# Patient Record
Sex: MALE | Race: ASIAN | NOT HISPANIC OR LATINO | Employment: UNEMPLOYED | URBAN - METROPOLITAN AREA
[De-identification: names, ages, dates, MRNs, and addresses within clinical notes are randomized per-mention and may not be internally consistent; named-entity substitution may affect disease eponyms.]

---

## 2017-02-09 ENCOUNTER — OFFICE VISIT - HEALTHEAST (OUTPATIENT)
Dept: FAMILY MEDICINE | Facility: CLINIC | Age: 62
End: 2017-02-09

## 2017-02-09 DIAGNOSIS — M70.71 HIP BURSITIS, RIGHT: ICD-10-CM

## 2017-02-09 DIAGNOSIS — R10.9 ABDOMINAL PAIN: ICD-10-CM

## 2017-02-09 DIAGNOSIS — Z00.00 ROUTINE HEALTH MAINTENANCE: ICD-10-CM

## 2017-02-09 DIAGNOSIS — M79.18 MYOFASCIAL PAIN: ICD-10-CM

## 2017-02-13 ENCOUNTER — AMBULATORY - HEALTHEAST (OUTPATIENT)
Dept: LAB | Facility: CLINIC | Age: 62
End: 2017-02-13

## 2017-02-13 DIAGNOSIS — Z00.00 ROUTINE HEALTH MAINTENANCE: ICD-10-CM

## 2017-03-10 ENCOUNTER — OFFICE VISIT - HEALTHEAST (OUTPATIENT)
Dept: FAMILY MEDICINE | Facility: CLINIC | Age: 62
End: 2017-03-10

## 2017-03-10 DIAGNOSIS — J20.9 ACUTE BRONCHITIS: ICD-10-CM

## 2017-03-10 DIAGNOSIS — R12 HEARTBURN: ICD-10-CM

## 2017-03-10 DIAGNOSIS — M70.71 HIP BURSITIS, RIGHT: ICD-10-CM

## 2017-05-02 ENCOUNTER — COMMUNICATION - HEALTHEAST (OUTPATIENT)
Dept: FAMILY MEDICINE | Facility: CLINIC | Age: 62
End: 2017-05-02

## 2017-05-02 ENCOUNTER — OFFICE VISIT - HEALTHEAST (OUTPATIENT)
Dept: FAMILY MEDICINE | Facility: CLINIC | Age: 62
End: 2017-05-02

## 2017-05-02 DIAGNOSIS — M70.71 HIP BURSITIS, RIGHT: ICD-10-CM

## 2017-05-02 DIAGNOSIS — R12 HEARTBURN: ICD-10-CM

## 2017-05-02 DIAGNOSIS — M79.18 MYOFASCIAL PAIN: ICD-10-CM

## 2017-05-23 ENCOUNTER — AMBULATORY - HEALTHEAST (OUTPATIENT)
Dept: NURSING | Facility: CLINIC | Age: 62
End: 2017-05-23

## 2017-05-30 ENCOUNTER — OFFICE VISIT - HEALTHEAST (OUTPATIENT)
Dept: FAMILY MEDICINE | Facility: CLINIC | Age: 62
End: 2017-05-30

## 2017-05-30 DIAGNOSIS — M25.512 SHOULDER PAIN, LEFT: ICD-10-CM

## 2017-05-30 DIAGNOSIS — M70.71 HIP BURSITIS, RIGHT: ICD-10-CM

## 2017-05-30 DIAGNOSIS — M79.18 MYOFASCIAL PAIN: ICD-10-CM

## 2017-05-30 DIAGNOSIS — F39 MOOD DISORDER (H): ICD-10-CM

## 2017-05-30 DIAGNOSIS — R05.9 COUGH: ICD-10-CM

## 2017-06-06 ENCOUNTER — COMMUNICATION - HEALTHEAST (OUTPATIENT)
Dept: FAMILY MEDICINE | Facility: CLINIC | Age: 62
End: 2017-06-06

## 2017-06-08 ENCOUNTER — COMMUNICATION - HEALTHEAST (OUTPATIENT)
Dept: NURSING | Facility: CLINIC | Age: 62
End: 2017-06-08

## 2017-06-20 ENCOUNTER — COMMUNICATION - HEALTHEAST (OUTPATIENT)
Dept: NURSING | Facility: CLINIC | Age: 62
End: 2017-06-20

## 2017-06-21 ENCOUNTER — COMMUNICATION - HEALTHEAST (OUTPATIENT)
Dept: NURSING | Facility: CLINIC | Age: 62
End: 2017-06-21

## 2017-06-28 ENCOUNTER — COMMUNICATION - HEALTHEAST (OUTPATIENT)
Dept: NURSING | Facility: CLINIC | Age: 62
End: 2017-06-28

## 2017-06-28 ENCOUNTER — COMMUNICATION - HEALTHEAST (OUTPATIENT)
Dept: FAMILY MEDICINE | Facility: CLINIC | Age: 62
End: 2017-06-28

## 2017-06-28 ENCOUNTER — AMBULATORY - HEALTHEAST (OUTPATIENT)
Dept: CARE COORDINATION | Facility: CLINIC | Age: 62
End: 2017-06-28

## 2017-06-28 DIAGNOSIS — Z71.89 COUNSELING AND COORDINATION OF CARE: ICD-10-CM

## 2017-06-28 DIAGNOSIS — M79.18 MYOFASCIAL PAIN: ICD-10-CM

## 2017-07-05 ENCOUNTER — COMMUNICATION - HEALTHEAST (OUTPATIENT)
Dept: FAMILY MEDICINE | Facility: CLINIC | Age: 62
End: 2017-07-05

## 2017-07-05 ENCOUNTER — OFFICE VISIT - HEALTHEAST (OUTPATIENT)
Dept: FAMILY MEDICINE | Facility: CLINIC | Age: 62
End: 2017-07-05

## 2017-07-05 ENCOUNTER — COMMUNICATION - HEALTHEAST (OUTPATIENT)
Dept: NURSING | Facility: CLINIC | Age: 62
End: 2017-07-05

## 2017-07-05 DIAGNOSIS — R53.83 FATIGUE: ICD-10-CM

## 2017-07-05 DIAGNOSIS — M79.18 MYOFASCIAL PAIN: ICD-10-CM

## 2017-07-05 DIAGNOSIS — M79.606 LOWER EXTREMITY PAIN: ICD-10-CM

## 2017-07-05 DIAGNOSIS — Z71.89 COUNSELING AND COORDINATION OF CARE: ICD-10-CM

## 2017-07-05 DIAGNOSIS — R10.9 ABDOMINAL PAIN: ICD-10-CM

## 2017-07-07 ENCOUNTER — COMMUNICATION - HEALTHEAST (OUTPATIENT)
Dept: NURSING | Facility: CLINIC | Age: 62
End: 2017-07-07

## 2017-07-10 ENCOUNTER — COMMUNICATION - HEALTHEAST (OUTPATIENT)
Dept: CARE COORDINATION | Facility: CLINIC | Age: 62
End: 2017-07-10

## 2017-07-11 ENCOUNTER — AMBULATORY - HEALTHEAST (OUTPATIENT)
Dept: FAMILY MEDICINE | Facility: CLINIC | Age: 62
End: 2017-07-11

## 2017-07-11 ENCOUNTER — COMMUNICATION - HEALTHEAST (OUTPATIENT)
Dept: FAMILY MEDICINE | Facility: CLINIC | Age: 62
End: 2017-07-11

## 2017-07-24 ENCOUNTER — COMMUNICATION - HEALTHEAST (OUTPATIENT)
Dept: NURSING | Facility: CLINIC | Age: 62
End: 2017-07-24

## 2017-07-25 ENCOUNTER — COMMUNICATION - HEALTHEAST (OUTPATIENT)
Dept: CARE COORDINATION | Facility: CLINIC | Age: 62
End: 2017-07-25

## 2017-07-25 ENCOUNTER — COMMUNICATION - HEALTHEAST (OUTPATIENT)
Dept: NURSING | Facility: CLINIC | Age: 62
End: 2017-07-25

## 2017-07-25 ENCOUNTER — AMBULATORY - HEALTHEAST (OUTPATIENT)
Dept: CARE COORDINATION | Facility: CLINIC | Age: 62
End: 2017-07-25

## 2017-08-02 ENCOUNTER — OFFICE VISIT - HEALTHEAST (OUTPATIENT)
Dept: FAMILY MEDICINE | Facility: CLINIC | Age: 62
End: 2017-08-02

## 2017-08-02 DIAGNOSIS — R10.11 RUQ PAIN: ICD-10-CM

## 2017-08-02 DIAGNOSIS — K21.9 GERD (GASTROESOPHAGEAL REFLUX DISEASE): ICD-10-CM

## 2017-08-02 RX ORDER — OMEPRAZOLE 40 MG/1
40 CAPSULE, DELAYED RELEASE ORAL DAILY
Qty: 90 CAPSULE | Refills: 3 | Status: SHIPPED | OUTPATIENT
Start: 2017-08-02 | End: 2023-11-28

## 2017-08-03 ENCOUNTER — COMMUNICATION - HEALTHEAST (OUTPATIENT)
Dept: FAMILY MEDICINE | Facility: CLINIC | Age: 62
End: 2017-08-03

## 2017-08-03 DIAGNOSIS — M79.18 MYOFASCIAL PAIN: ICD-10-CM

## 2017-08-04 ENCOUNTER — OFFICE VISIT - HEALTHEAST (OUTPATIENT)
Dept: FAMILY MEDICINE | Facility: CLINIC | Age: 62
End: 2017-08-04

## 2017-08-04 DIAGNOSIS — R10.9 ABDOMINAL PAIN: ICD-10-CM

## 2017-08-04 DIAGNOSIS — M79.606 LOWER EXTREMITY PAIN: ICD-10-CM

## 2017-08-04 DIAGNOSIS — K21.9 GERD (GASTROESOPHAGEAL REFLUX DISEASE): ICD-10-CM

## 2017-08-07 ENCOUNTER — COMMUNICATION - HEALTHEAST (OUTPATIENT)
Dept: NURSING | Facility: CLINIC | Age: 62
End: 2017-08-07

## 2017-08-11 ENCOUNTER — OFFICE VISIT - HEALTHEAST (OUTPATIENT)
Dept: PHYSICAL THERAPY | Facility: REHABILITATION | Age: 62
End: 2017-08-11

## 2017-08-11 DIAGNOSIS — G89.29 CHRONIC RIGHT HIP PAIN: ICD-10-CM

## 2017-08-11 DIAGNOSIS — M54.16 LUMBAR RADICULOPATHY, CHRONIC: ICD-10-CM

## 2017-08-11 DIAGNOSIS — M62.81 GENERALIZED MUSCLE WEAKNESS: ICD-10-CM

## 2017-08-11 DIAGNOSIS — M25.551 CHRONIC RIGHT HIP PAIN: ICD-10-CM

## 2017-08-15 ENCOUNTER — COMMUNICATION - HEALTHEAST (OUTPATIENT)
Dept: FAMILY MEDICINE | Facility: CLINIC | Age: 62
End: 2017-08-15

## 2017-08-19 ENCOUNTER — COMMUNICATION - HEALTHEAST (OUTPATIENT)
Dept: FAMILY MEDICINE | Facility: CLINIC | Age: 62
End: 2017-08-19

## 2017-08-19 DIAGNOSIS — M79.18 MYOFASCIAL PAIN: ICD-10-CM

## 2017-09-01 ENCOUNTER — COMMUNICATION - HEALTHEAST (OUTPATIENT)
Dept: NURSING | Facility: CLINIC | Age: 62
End: 2017-09-01

## 2017-09-05 ENCOUNTER — COMMUNICATION - HEALTHEAST (OUTPATIENT)
Dept: NURSING | Facility: CLINIC | Age: 62
End: 2017-09-05

## 2017-09-07 ENCOUNTER — COMMUNICATION - HEALTHEAST (OUTPATIENT)
Dept: NURSING | Facility: CLINIC | Age: 62
End: 2017-09-07

## 2017-09-18 ENCOUNTER — RECORDS - HEALTHEAST (OUTPATIENT)
Dept: ADMINISTRATIVE | Facility: OTHER | Age: 62
End: 2017-09-18

## 2017-09-19 ENCOUNTER — COMMUNICATION - HEALTHEAST (OUTPATIENT)
Dept: FAMILY MEDICINE | Facility: CLINIC | Age: 62
End: 2017-09-19

## 2017-09-19 ENCOUNTER — OFFICE VISIT - HEALTHEAST (OUTPATIENT)
Dept: FAMILY MEDICINE | Facility: CLINIC | Age: 62
End: 2017-09-19

## 2017-09-19 DIAGNOSIS — M54.16 LUMBAR RADICULOPATHY: ICD-10-CM

## 2017-09-19 DIAGNOSIS — M79.18 MYOFASCIAL PAIN: ICD-10-CM

## 2017-09-19 DIAGNOSIS — K21.9 GERD (GASTROESOPHAGEAL REFLUX DISEASE): ICD-10-CM

## 2017-09-19 RX ORDER — GABAPENTIN 300 MG/1
CAPSULE ORAL
Qty: 60 CAPSULE | Refills: 5 | Status: SHIPPED | OUTPATIENT
Start: 2017-09-19 | End: 2023-11-28

## 2017-09-19 ASSESSMENT — MIFFLIN-ST. JEOR: SCORE: 1206.19

## 2017-10-05 ENCOUNTER — HOSPITAL ENCOUNTER (OUTPATIENT)
Dept: PHYSICAL MEDICINE AND REHAB | Facility: CLINIC | Age: 62
Discharge: HOME | End: 2017-10-05
Attending: FAMILY MEDICINE

## 2017-10-05 DIAGNOSIS — M48.061 LUMBAR SPINAL STENOSIS: ICD-10-CM

## 2017-10-05 DIAGNOSIS — F40.240 CLAUSTROPHOBIA: ICD-10-CM

## 2017-10-05 DIAGNOSIS — M54.50 LUMBAR SPINE PAIN: ICD-10-CM

## 2017-10-05 ASSESSMENT — MIFFLIN-ST. JEOR: SCORE: 1215.26

## 2017-10-09 ENCOUNTER — COMMUNICATION - HEALTHEAST (OUTPATIENT)
Dept: NURSING | Facility: CLINIC | Age: 62
End: 2017-10-09

## 2017-10-12 ENCOUNTER — COMMUNICATION - HEALTHEAST (OUTPATIENT)
Dept: CARE COORDINATION | Facility: CLINIC | Age: 62
End: 2017-10-12

## 2017-10-15 ENCOUNTER — COMMUNICATION - HEALTHEAST (OUTPATIENT)
Dept: FAMILY MEDICINE | Facility: CLINIC | Age: 62
End: 2017-10-15

## 2017-10-17 ENCOUNTER — OFFICE VISIT - HEALTHEAST (OUTPATIENT)
Dept: FAMILY MEDICINE | Facility: CLINIC | Age: 62
End: 2017-10-17

## 2017-10-17 DIAGNOSIS — Z23 NEED FOR PROPHYLACTIC VACCINATION AND INOCULATION AGAINST INFLUENZA: ICD-10-CM

## 2017-10-17 DIAGNOSIS — M25.512 SHOULDER PAIN, LEFT: ICD-10-CM

## 2017-10-17 DIAGNOSIS — M54.16 LUMBAR RADICULOPATHY: ICD-10-CM

## 2017-10-20 ENCOUNTER — RECORDS - HEALTHEAST (OUTPATIENT)
Dept: ADMINISTRATIVE | Facility: OTHER | Age: 62
End: 2017-10-20

## 2017-10-27 ENCOUNTER — COMMUNICATION - HEALTHEAST (OUTPATIENT)
Dept: NURSING | Facility: CLINIC | Age: 62
End: 2017-10-27

## 2017-11-01 ENCOUNTER — COMMUNICATION - HEALTHEAST (OUTPATIENT)
Dept: NURSING | Facility: CLINIC | Age: 62
End: 2017-11-01

## 2017-11-02 ENCOUNTER — COMMUNICATION - HEALTHEAST (OUTPATIENT)
Dept: NURSING | Facility: CLINIC | Age: 62
End: 2017-11-02

## 2017-11-07 ENCOUNTER — COMMUNICATION - HEALTHEAST (OUTPATIENT)
Dept: NURSING | Facility: CLINIC | Age: 62
End: 2017-11-07

## 2017-11-13 ENCOUNTER — COMMUNICATION - HEALTHEAST (OUTPATIENT)
Dept: NURSING | Facility: CLINIC | Age: 62
End: 2017-11-13

## 2017-11-28 ENCOUNTER — COMMUNICATION - HEALTHEAST (OUTPATIENT)
Dept: NURSING | Facility: CLINIC | Age: 62
End: 2017-11-28

## 2017-11-28 ENCOUNTER — OFFICE VISIT - HEALTHEAST (OUTPATIENT)
Dept: FAMILY MEDICINE | Facility: CLINIC | Age: 62
End: 2017-11-28

## 2017-11-28 DIAGNOSIS — F39 MOOD DISORDER (H): ICD-10-CM

## 2017-11-28 DIAGNOSIS — K21.9 GERD (GASTROESOPHAGEAL REFLUX DISEASE): ICD-10-CM

## 2017-11-28 DIAGNOSIS — M79.18 MYOFASCIAL PAIN: ICD-10-CM

## 2017-11-28 DIAGNOSIS — M54.16 LUMBAR RADICULOPATHY: ICD-10-CM

## 2017-12-07 ENCOUNTER — COMMUNICATION - HEALTHEAST (OUTPATIENT)
Dept: NURSING | Facility: CLINIC | Age: 62
End: 2017-12-07

## 2017-12-13 ENCOUNTER — COMMUNICATION - HEALTHEAST (OUTPATIENT)
Dept: NURSING | Facility: CLINIC | Age: 62
End: 2017-12-13

## 2017-12-13 ENCOUNTER — AMBULATORY - HEALTHEAST (OUTPATIENT)
Dept: CARE COORDINATION | Facility: CLINIC | Age: 62
End: 2017-12-13

## 2017-12-13 ENCOUNTER — OFFICE VISIT - HEALTHEAST (OUTPATIENT)
Dept: NURSING | Facility: CLINIC | Age: 62
End: 2017-12-13

## 2017-12-13 DIAGNOSIS — K21.9 GERD (GASTROESOPHAGEAL REFLUX DISEASE): ICD-10-CM

## 2017-12-13 DIAGNOSIS — Z71.89 COUNSELING AND COORDINATION OF CARE: ICD-10-CM

## 2017-12-13 DIAGNOSIS — M54.16 LUMBAR RADICULOPATHY: ICD-10-CM

## 2017-12-13 DIAGNOSIS — Z71.9 HEALTH EDUCATION: ICD-10-CM

## 2017-12-13 DIAGNOSIS — K21.9 GASTROESOPHAGEAL REFLUX DISEASE, ESOPHAGITIS PRESENCE NOT SPECIFIED: ICD-10-CM

## 2017-12-13 RX ORDER — ERGOCALCIFEROL 1.25 MG/1
50000 CAPSULE ORAL
Status: SHIPPED | COMMUNITY
Start: 2017-12-13 | End: 2023-11-28

## 2017-12-14 RX ORDER — ACETAMINOPHEN 500 MG
1000 TABLET ORAL EVERY 6 HOURS PRN
Qty: 100 TABLET | Refills: 2 | Status: SHIPPED | OUTPATIENT
Start: 2017-12-14 | End: 2023-11-28

## 2017-12-18 ENCOUNTER — RECORDS - HEALTHEAST (OUTPATIENT)
Dept: ADMINISTRATIVE | Facility: OTHER | Age: 62
End: 2017-12-18

## 2017-12-19 ENCOUNTER — RECORDS - HEALTHEAST (OUTPATIENT)
Dept: ADMINISTRATIVE | Facility: OTHER | Age: 62
End: 2017-12-19

## 2017-12-20 ENCOUNTER — RECORDS - HEALTHEAST (OUTPATIENT)
Dept: ADMINISTRATIVE | Facility: OTHER | Age: 62
End: 2017-12-20

## 2017-12-26 ENCOUNTER — COMMUNICATION - HEALTHEAST (OUTPATIENT)
Dept: NURSING | Facility: CLINIC | Age: 62
End: 2017-12-26

## 2017-12-26 ENCOUNTER — OFFICE VISIT - HEALTHEAST (OUTPATIENT)
Dept: FAMILY MEDICINE | Facility: CLINIC | Age: 62
End: 2017-12-26

## 2017-12-26 DIAGNOSIS — M25.512 ACUTE PAIN OF LEFT SHOULDER: ICD-10-CM

## 2017-12-26 DIAGNOSIS — G89.29 CHRONIC ABDOMINAL PAIN: ICD-10-CM

## 2017-12-26 DIAGNOSIS — R10.9 CHRONIC ABDOMINAL PAIN: ICD-10-CM

## 2017-12-26 RX ORDER — DOCUSATE SODIUM 100 MG/1
CAPSULE, LIQUID FILLED ORAL
Refills: 6 | Status: SHIPPED | COMMUNITY
Start: 2017-11-28 | End: 2023-11-28

## 2017-12-26 RX ORDER — BISACODYL 5 MG/1
TABLET, DELAYED RELEASE ORAL
Refills: 0 | Status: SHIPPED | COMMUNITY
Start: 2017-12-08 | End: 2023-11-28

## 2017-12-26 RX ORDER — MAGNESIUM CITRATE
SOLUTION, ORAL ORAL
Refills: 0 | Status: SHIPPED | COMMUNITY
Start: 2017-12-08 | End: 2023-11-28

## 2017-12-26 RX ORDER — POLYETHYLENE GLYCOL 3350 17 G/17G
POWDER, FOR SOLUTION ORAL
Refills: 0 | Status: SHIPPED | COMMUNITY
Start: 2017-12-08 | End: 2023-11-28

## 2017-12-26 ASSESSMENT — MIFFLIN-ST. JEOR: SCORE: 1237.94

## 2018-01-10 ENCOUNTER — OFFICE VISIT - HEALTHEAST (OUTPATIENT)
Dept: FAMILY MEDICINE | Facility: CLINIC | Age: 63
End: 2018-01-10

## 2018-01-10 DIAGNOSIS — M54.16 LUMBAR RADICULOPATHY: ICD-10-CM

## 2018-01-10 DIAGNOSIS — K21.9 GERD (GASTROESOPHAGEAL REFLUX DISEASE): ICD-10-CM

## 2018-01-10 DIAGNOSIS — J20.9 ACUTE BRONCHITIS: ICD-10-CM

## 2018-01-10 DIAGNOSIS — R61 DIAPHORESIS: ICD-10-CM

## 2018-01-10 RX ORDER — LORAZEPAM 0.5 MG
4000 TABLET ORAL DAILY
Refills: 3 | Status: SHIPPED | COMMUNITY
Start: 2017-12-30

## 2018-01-12 ENCOUNTER — COMMUNICATION - HEALTHEAST (OUTPATIENT)
Dept: FAMILY MEDICINE | Facility: CLINIC | Age: 63
End: 2018-01-12

## 2018-01-12 ENCOUNTER — AMBULATORY - HEALTHEAST (OUTPATIENT)
Dept: FAMILY MEDICINE | Facility: CLINIC | Age: 63
End: 2018-01-12

## 2018-01-12 DIAGNOSIS — H91.90 HEARING LOSS: ICD-10-CM

## 2018-01-12 LAB
QTF INTERPRETATION: NORMAL
QTF MITOGEN - NIL: >10 IU/ML
QTF NIL: 0.88 IU/ML
QTF RESULT: NEGATIVE
QTF TB ANTIGEN - NIL: -0.07 IU/ML

## 2018-01-29 ENCOUNTER — COMMUNICATION - HEALTHEAST (OUTPATIENT)
Dept: CARE COORDINATION | Facility: CLINIC | Age: 63
End: 2018-01-29

## 2018-02-06 ENCOUNTER — COMMUNICATION - HEALTHEAST (OUTPATIENT)
Dept: NURSING | Facility: CLINIC | Age: 63
End: 2018-02-06

## 2018-02-08 ENCOUNTER — RECORDS - HEALTHEAST (OUTPATIENT)
Dept: LAB | Facility: CLINIC | Age: 63
End: 2018-02-08

## 2018-02-08 ENCOUNTER — RECORDS - HEALTHEAST (OUTPATIENT)
Dept: ADMINISTRATIVE | Facility: OTHER | Age: 63
End: 2018-02-08

## 2018-02-08 LAB
ALBUMIN SERPL-MCNC: 4.1 G/DL (ref 3.5–5)
ALP SERPL-CCNC: 121 U/L (ref 45–120)
ALT SERPL W P-5'-P-CCNC: 28 U/L (ref 0–45)
ANION GAP SERPL CALCULATED.3IONS-SCNC: 13 MMOL/L (ref 5–18)
AST SERPL W P-5'-P-CCNC: 29 U/L (ref 0–40)
BILIRUB SERPL-MCNC: 0.8 MG/DL (ref 0–1)
BUN SERPL-MCNC: 13 MG/DL (ref 8–22)
CALCIUM SERPL-MCNC: 10.2 MG/DL (ref 8.5–10.5)
CHLORIDE BLD-SCNC: 104 MMOL/L (ref 98–107)
CO2 SERPL-SCNC: 24 MMOL/L (ref 22–31)
CREAT SERPL-MCNC: 0.83 MG/DL (ref 0.7–1.3)
GFR SERPL CREATININE-BSD FRML MDRD: >60 ML/MIN/1.73M2
GLUCOSE BLD-MCNC: 90 MG/DL (ref 70–125)
POTASSIUM BLD-SCNC: 4.4 MMOL/L (ref 3.5–5)
PROT SERPL-MCNC: 8.3 G/DL (ref 6–8)
SODIUM SERPL-SCNC: 141 MMOL/L (ref 136–145)

## 2018-02-09 LAB
HBV SURFACE AG SERPL QL IA: NEGATIVE
HCV AB SERPL QL IA: NEGATIVE

## 2018-02-20 ENCOUNTER — COMMUNICATION - HEALTHEAST (OUTPATIENT)
Dept: NURSING | Facility: CLINIC | Age: 63
End: 2018-02-20

## 2018-02-21 ENCOUNTER — RECORDS - HEALTHEAST (OUTPATIENT)
Dept: ADMINISTRATIVE | Facility: OTHER | Age: 63
End: 2018-02-21

## 2018-02-23 ENCOUNTER — COMMUNICATION - HEALTHEAST (OUTPATIENT)
Dept: NURSING | Facility: CLINIC | Age: 63
End: 2018-02-23

## 2018-02-23 ENCOUNTER — AMBULATORY - HEALTHEAST (OUTPATIENT)
Dept: CARE COORDINATION | Facility: CLINIC | Age: 63
End: 2018-02-23

## 2018-02-23 ENCOUNTER — COMMUNICATION - HEALTHEAST (OUTPATIENT)
Dept: CARE COORDINATION | Facility: CLINIC | Age: 63
End: 2018-02-23

## 2018-02-26 ENCOUNTER — RECORDS - HEALTHEAST (OUTPATIENT)
Dept: LAB | Facility: CLINIC | Age: 63
End: 2018-02-26

## 2018-02-26 ENCOUNTER — RECORDS - HEALTHEAST (OUTPATIENT)
Dept: ADMINISTRATIVE | Facility: OTHER | Age: 63
End: 2018-02-26

## 2018-02-26 LAB
FOLATE SERPL-MCNC: 5.4 NG/ML
HIV 1+2 AB+HIV1 P24 AG SERPL QL IA: NEGATIVE
TSH SERPL DL<=0.005 MIU/L-ACNC: 6.57 UIU/ML (ref 0.3–5)
VIT B12 SERPL-MCNC: 404 PG/ML (ref 213–816)

## 2018-02-27 ENCOUNTER — RECORDS - HEALTHEAST (OUTPATIENT)
Dept: LAB | Facility: CLINIC | Age: 63
End: 2018-02-27

## 2018-02-27 LAB
SYPHILIS RPR SCREEN - HISTORICAL: NORMAL
T3 SERPL-MCNC: 89 NG/DL (ref 45–175)
T4 FREE SERPL-MCNC: 1 NG/DL (ref 0.7–1.8)

## 2018-03-02 ENCOUNTER — RECORDS - HEALTHEAST (OUTPATIENT)
Dept: ADMINISTRATIVE | Facility: OTHER | Age: 63
End: 2018-03-02

## 2018-03-02 ENCOUNTER — RECORDS - HEALTHEAST (OUTPATIENT)
Dept: LAB | Facility: CLINIC | Age: 63
End: 2018-03-02

## 2018-03-02 LAB
ANION GAP SERPL CALCULATED.3IONS-SCNC: 8 MMOL/L (ref 5–18)
BNP SERPL-MCNC: <10 PG/ML (ref 0–53)
BUN SERPL-MCNC: 15 MG/DL (ref 8–22)
CALCIUM SERPL-MCNC: 9.5 MG/DL (ref 8.5–10.5)
CHLORIDE BLD-SCNC: 105 MMOL/L (ref 98–107)
CO2 SERPL-SCNC: 29 MMOL/L (ref 22–31)
CREAT SERPL-MCNC: 0.85 MG/DL (ref 0.7–1.3)
GFR SERPL CREATININE-BSD FRML MDRD: >60 ML/MIN/1.73M2
GLUCOSE BLD-MCNC: 120 MG/DL (ref 70–125)
POTASSIUM BLD-SCNC: 3.8 MMOL/L (ref 3.5–5)
SODIUM SERPL-SCNC: 142 MMOL/L (ref 136–145)

## 2018-03-07 ENCOUNTER — RECORDS - HEALTHEAST (OUTPATIENT)
Dept: ADMINISTRATIVE | Facility: OTHER | Age: 63
End: 2018-03-07

## 2018-03-08 ENCOUNTER — RECORDS - HEALTHEAST (OUTPATIENT)
Dept: ADMINISTRATIVE | Facility: OTHER | Age: 63
End: 2018-03-08

## 2018-03-08 ENCOUNTER — COMMUNICATION - HEALTHEAST (OUTPATIENT)
Dept: OTOLARYNGOLOGY | Facility: CLINIC | Age: 63
End: 2018-03-08

## 2018-03-09 ENCOUNTER — COMMUNICATION - HEALTHEAST (OUTPATIENT)
Dept: ENDOCRINOLOGY | Facility: CLINIC | Age: 63
End: 2018-03-09

## 2018-03-15 ENCOUNTER — RECORDS - HEALTHEAST (OUTPATIENT)
Dept: LAB | Facility: CLINIC | Age: 63
End: 2018-03-15

## 2018-03-15 ENCOUNTER — RECORDS - HEALTHEAST (OUTPATIENT)
Dept: ADMINISTRATIVE | Facility: OTHER | Age: 63
End: 2018-03-15

## 2018-03-17 LAB — BACTERIA SPEC CULT: NORMAL

## 2018-03-20 ENCOUNTER — RECORDS - HEALTHEAST (OUTPATIENT)
Dept: ADMINISTRATIVE | Facility: OTHER | Age: 63
End: 2018-03-20

## 2018-03-25 ENCOUNTER — COMMUNICATION - HEALTHEAST (OUTPATIENT)
Dept: FAMILY MEDICINE | Facility: CLINIC | Age: 63
End: 2018-03-25

## 2018-03-26 ENCOUNTER — RECORDS - HEALTHEAST (OUTPATIENT)
Dept: ADMINISTRATIVE | Facility: OTHER | Age: 63
End: 2018-03-26

## 2018-04-18 ENCOUNTER — RECORDS - HEALTHEAST (OUTPATIENT)
Dept: ADMINISTRATIVE | Facility: OTHER | Age: 63
End: 2018-04-18

## 2018-04-26 ENCOUNTER — RECORDS - HEALTHEAST (OUTPATIENT)
Dept: ADMINISTRATIVE | Facility: OTHER | Age: 63
End: 2018-04-26

## 2018-04-26 ENCOUNTER — RECORDS - HEALTHEAST (OUTPATIENT)
Dept: LAB | Facility: CLINIC | Age: 63
End: 2018-04-26

## 2018-04-26 LAB
ANION GAP SERPL CALCULATED.3IONS-SCNC: 12 MMOL/L (ref 5–18)
BUN SERPL-MCNC: 12 MG/DL (ref 8–22)
CALCIUM SERPL-MCNC: 9.8 MG/DL (ref 8.5–10.5)
CHLORIDE BLD-SCNC: 106 MMOL/L (ref 98–107)
CO2 SERPL-SCNC: 23 MMOL/L (ref 22–31)
CREAT SERPL-MCNC: 0.82 MG/DL (ref 0.7–1.3)
GFR SERPL CREATININE-BSD FRML MDRD: >60 ML/MIN/1.73M2
GLUCOSE BLD-MCNC: 93 MG/DL (ref 70–125)
POTASSIUM BLD-SCNC: 3.9 MMOL/L (ref 3.5–5)
SODIUM SERPL-SCNC: 141 MMOL/L (ref 136–145)

## 2018-05-23 ENCOUNTER — OFFICE VISIT - HEALTHEAST (OUTPATIENT)
Dept: INFECTIOUS DISEASES | Facility: CLINIC | Age: 63
End: 2018-05-23

## 2018-05-23 DIAGNOSIS — B69.0 NEUROCYSTICERCOSIS: ICD-10-CM

## 2018-05-23 ASSESSMENT — MIFFLIN-ST. JEOR: SCORE: 1222.98

## 2018-06-06 ENCOUNTER — OFFICE VISIT - HEALTHEAST (OUTPATIENT)
Dept: INFECTIOUS DISEASES | Facility: CLINIC | Age: 63
End: 2018-06-06

## 2018-06-06 DIAGNOSIS — B69.0 NEUROCYSTICERCOSIS: ICD-10-CM

## 2018-06-06 ASSESSMENT — MIFFLIN-ST. JEOR: SCORE: 1219.8

## 2018-07-11 ENCOUNTER — OFFICE VISIT - HEALTHEAST (OUTPATIENT)
Dept: INFECTIOUS DISEASES | Facility: CLINIC | Age: 63
End: 2018-07-11

## 2018-07-11 DIAGNOSIS — B69.0 NEUROCYSTICERCOSIS: ICD-10-CM

## 2018-07-11 RX ORDER — ALBENDAZOLE 200 MG/1
400 TABLET, FILM COATED ORAL 2 TIMES DAILY
Status: SHIPPED | COMMUNITY
Start: 2018-07-11 | End: 2023-11-28

## 2018-07-11 RX ORDER — LEVOTHYROXINE SODIUM 25 UG/1
1 TABLET ORAL DAILY
Status: SHIPPED | COMMUNITY
Start: 2018-07-10

## 2018-07-11 ASSESSMENT — MIFFLIN-ST. JEOR: SCORE: 1219.8

## 2018-07-18 ENCOUNTER — RECORDS - HEALTHEAST (OUTPATIENT)
Dept: LAB | Facility: CLINIC | Age: 63
End: 2018-07-18

## 2018-07-18 LAB
ANION GAP SERPL CALCULATED.3IONS-SCNC: 14 MMOL/L (ref 5–18)
BUN SERPL-MCNC: 15 MG/DL (ref 8–22)
CALCIUM SERPL-MCNC: 10.1 MG/DL (ref 8.5–10.5)
CHLORIDE BLD-SCNC: 101 MMOL/L (ref 98–107)
CO2 SERPL-SCNC: 25 MMOL/L (ref 22–31)
CREAT SERPL-MCNC: 0.99 MG/DL (ref 0.7–1.3)
GFR SERPL CREATININE-BSD FRML MDRD: >60 ML/MIN/1.73M2
GLUCOSE BLD-MCNC: 101 MG/DL (ref 70–125)
POTASSIUM BLD-SCNC: 4.2 MMOL/L (ref 3.5–5)
SODIUM SERPL-SCNC: 140 MMOL/L (ref 136–145)
URATE SERPL-MCNC: 10.2 MG/DL (ref 3–8)

## 2018-11-21 ENCOUNTER — RECORDS - HEALTHEAST (OUTPATIENT)
Dept: LAB | Facility: CLINIC | Age: 63
End: 2018-11-21

## 2018-11-21 LAB
BASOPHILS # BLD AUTO: 0.1 THOU/UL (ref 0–0.2)
BASOPHILS NFR BLD AUTO: 1 % (ref 0–2)
EOSINOPHIL # BLD AUTO: 0.3 THOU/UL (ref 0–0.4)
EOSINOPHIL NFR BLD AUTO: 5 % (ref 0–6)
ERYTHROCYTE [DISTWIDTH] IN BLOOD BY AUTOMATED COUNT: 12.5 % (ref 11–14.5)
HCT VFR BLD AUTO: 50.4 % (ref 40–54)
HGB BLD-MCNC: 17.6 G/DL (ref 14–18)
LYMPHOCYTES # BLD AUTO: 2.2 THOU/UL (ref 0.8–4.4)
LYMPHOCYTES NFR BLD AUTO: 30 % (ref 20–40)
MCH RBC QN AUTO: 31.5 PG (ref 27–34)
MCHC RBC AUTO-ENTMCNC: 34.9 G/DL (ref 32–36)
MCV RBC AUTO: 90 FL (ref 80–100)
MONOCYTES # BLD AUTO: 0.6 THOU/UL (ref 0–0.9)
MONOCYTES NFR BLD AUTO: 9 % (ref 2–10)
NEUTROPHILS # BLD AUTO: 4 THOU/UL (ref 2–7.7)
NEUTROPHILS NFR BLD AUTO: 56 % (ref 50–70)
PLATELET # BLD AUTO: 281 THOU/UL (ref 140–440)
PMV BLD AUTO: 10.9 FL (ref 8.5–12.5)
RBC # BLD AUTO: 5.59 MILL/UL (ref 4.4–6.2)
WBC: 7.3 THOU/UL (ref 4–11)

## 2018-12-12 ENCOUNTER — RECORDS - HEALTHEAST (OUTPATIENT)
Dept: ADMINISTRATIVE | Facility: OTHER | Age: 63
End: 2018-12-12

## 2018-12-17 ENCOUNTER — COMMUNICATION - HEALTHEAST (OUTPATIENT)
Dept: INFECTIOUS DISEASES | Facility: CLINIC | Age: 63
End: 2018-12-17

## 2018-12-19 ENCOUNTER — RECORDS - HEALTHEAST (OUTPATIENT)
Dept: LAB | Facility: CLINIC | Age: 63
End: 2018-12-19

## 2018-12-19 LAB — TSH SERPL DL<=0.005 MIU/L-ACNC: 1.88 UIU/ML (ref 0.3–5)

## 2019-02-20 ENCOUNTER — COMMUNICATION - HEALTHEAST (OUTPATIENT)
Dept: ADMINISTRATIVE | Facility: CLINIC | Age: 64
End: 2019-02-20

## 2019-03-27 ENCOUNTER — OFFICE VISIT - HEALTHEAST (OUTPATIENT)
Dept: INFECTIOUS DISEASES | Facility: CLINIC | Age: 64
End: 2019-03-27

## 2019-03-27 DIAGNOSIS — B69.0 NEUROCYSTICERCOSIS: ICD-10-CM

## 2019-04-03 ENCOUNTER — RECORDS - HEALTHEAST (OUTPATIENT)
Dept: LAB | Facility: CLINIC | Age: 64
End: 2019-04-03

## 2019-04-03 LAB
ALBUMIN SERPL-MCNC: 4.1 G/DL (ref 3.5–5)
ALP SERPL-CCNC: 92 U/L (ref 45–120)
ALT SERPL W P-5'-P-CCNC: 31 U/L (ref 0–45)
ANION GAP SERPL CALCULATED.3IONS-SCNC: 13 MMOL/L (ref 5–18)
AST SERPL W P-5'-P-CCNC: 22 U/L (ref 0–40)
BILIRUB SERPL-MCNC: 0.8 MG/DL (ref 0–1)
BUN SERPL-MCNC: 11 MG/DL (ref 8–22)
CALCIUM SERPL-MCNC: 9.8 MG/DL (ref 8.5–10.5)
CHLORIDE BLD-SCNC: 108 MMOL/L (ref 98–107)
CO2 SERPL-SCNC: 20 MMOL/L (ref 22–31)
CREAT SERPL-MCNC: 0.83 MG/DL (ref 0.7–1.3)
GFR SERPL CREATININE-BSD FRML MDRD: >60 ML/MIN/1.73M2
GLUCOSE BLD-MCNC: 134 MG/DL (ref 70–125)
LIPASE SERPL-CCNC: 31 U/L (ref 0–52)
POTASSIUM BLD-SCNC: 3.5 MMOL/L (ref 3.5–5)
PROT SERPL-MCNC: 7.7 G/DL (ref 6–8)
SODIUM SERPL-SCNC: 141 MMOL/L (ref 136–145)

## 2019-04-04 ENCOUNTER — RECORDS - HEALTHEAST (OUTPATIENT)
Dept: LAB | Facility: CLINIC | Age: 64
End: 2019-04-04

## 2019-04-05 LAB — HBA1C MFR BLD: 4.4 % (ref 4.2–6.1)

## 2019-04-24 ENCOUNTER — RECORDS - HEALTHEAST (OUTPATIENT)
Dept: LAB | Facility: CLINIC | Age: 64
End: 2019-04-24

## 2019-04-26 LAB
H PYLORI AG STL QL IA: NORMAL
REPORT STATUS: NORMAL
SPECIMEN DESCRIPTION: NORMAL

## 2019-05-15 ENCOUNTER — OFFICE VISIT - HEALTHEAST (OUTPATIENT)
Dept: INFECTIOUS DISEASES | Facility: CLINIC | Age: 64
End: 2019-05-15

## 2019-05-15 DIAGNOSIS — B69.0 NEUROCYSTICERCOSIS: ICD-10-CM

## 2019-05-15 LAB
ALBUMIN SERPL-MCNC: 4.1 G/DL (ref 3.5–5)
ALP SERPL-CCNC: 101 U/L (ref 45–120)
ALT SERPL W P-5'-P-CCNC: 35 U/L (ref 0–45)
ANION GAP SERPL CALCULATED.3IONS-SCNC: 10 MMOL/L (ref 5–18)
AST SERPL W P-5'-P-CCNC: 24 U/L (ref 0–40)
BILIRUB SERPL-MCNC: 0.7 MG/DL (ref 0–1)
BUN SERPL-MCNC: 11 MG/DL (ref 8–22)
CALCIUM SERPL-MCNC: 9.9 MG/DL (ref 8.5–10.5)
CHLORIDE BLD-SCNC: 105 MMOL/L (ref 98–107)
CO2 SERPL-SCNC: 26 MMOL/L (ref 22–31)
CREAT SERPL-MCNC: 1.03 MG/DL (ref 0.7–1.3)
ERYTHROCYTE [DISTWIDTH] IN BLOOD BY AUTOMATED COUNT: 12.3 % (ref 11–14.5)
GFR SERPL CREATININE-BSD FRML MDRD: >60 ML/MIN/1.73M2
GLUCOSE BLD-MCNC: 117 MG/DL (ref 70–125)
HCT VFR BLD AUTO: 47.6 % (ref 40–54)
HGB BLD-MCNC: 16.1 G/DL (ref 14–18)
MCH RBC QN AUTO: 31.3 PG (ref 27–34)
MCHC RBC AUTO-ENTMCNC: 33.8 G/DL (ref 32–36)
MCV RBC AUTO: 93 FL (ref 80–100)
PLATELET # BLD AUTO: 289 THOU/UL (ref 140–440)
PMV BLD AUTO: 7.7 FL (ref 7–10)
POTASSIUM BLD-SCNC: 4 MMOL/L (ref 3.5–5)
PROT SERPL-MCNC: 8 G/DL (ref 6–8)
RBC # BLD AUTO: 5.15 MILL/UL (ref 4.4–6.2)
SODIUM SERPL-SCNC: 141 MMOL/L (ref 136–145)
WBC: 6.6 THOU/UL (ref 4–11)

## 2019-05-17 ENCOUNTER — COMMUNICATION - HEALTHEAST (OUTPATIENT)
Dept: INFECTIOUS DISEASES | Facility: CLINIC | Age: 64
End: 2019-05-17

## 2019-05-18 LAB — ARUP MISCELLANEOUS TEST: NORMAL

## 2019-05-19 LAB
MISCELLANEOUS TEST DEPT. - HE HISTORICAL: NORMAL
PERFORMING LAB: NORMAL
SPECIMEN STATUS: NORMAL
TEST NAME: NORMAL

## 2019-05-22 ENCOUNTER — COMMUNICATION - HEALTHEAST (OUTPATIENT)
Dept: INFECTIOUS DISEASES | Facility: CLINIC | Age: 64
End: 2019-05-22

## 2019-07-31 ENCOUNTER — RECORDS - HEALTHEAST (OUTPATIENT)
Dept: LAB | Facility: CLINIC | Age: 64
End: 2019-07-31

## 2019-07-31 LAB — TSH SERPL DL<=0.005 MIU/L-ACNC: 2.08 UIU/ML (ref 0.3–5)

## 2019-08-01 LAB — 25(OH)D3 SERPL-MCNC: 29 NG/ML (ref 30–80)

## 2019-11-20 ENCOUNTER — RECORDS - HEALTHEAST (OUTPATIENT)
Dept: LAB | Facility: CLINIC | Age: 64
End: 2019-11-20

## 2019-11-20 LAB — URATE SERPL-MCNC: 7.1 MG/DL (ref 3–8)

## 2019-12-07 ENCOUNTER — RECORDS - HEALTHEAST (OUTPATIENT)
Dept: ADMINISTRATIVE | Facility: OTHER | Age: 64
End: 2019-12-07

## 2019-12-11 ENCOUNTER — COMMUNICATION - HEALTHEAST (OUTPATIENT)
Dept: INFECTIOUS DISEASES | Facility: CLINIC | Age: 64
End: 2019-12-11

## 2019-12-18 ENCOUNTER — OFFICE VISIT - HEALTHEAST (OUTPATIENT)
Dept: INFECTIOUS DISEASES | Facility: CLINIC | Age: 64
End: 2019-12-18

## 2019-12-18 ENCOUNTER — COMMUNICATION - HEALTHEAST (OUTPATIENT)
Dept: INFECTIOUS DISEASES | Facility: CLINIC | Age: 64
End: 2019-12-18

## 2019-12-18 DIAGNOSIS — B69.0 NEUROCYSTICERCOSIS: ICD-10-CM

## 2019-12-18 RX ORDER — PREDNISONE 10 MG/1
TABLET ORAL
Qty: 82 TABLET | Refills: 0 | Status: SHIPPED | OUTPATIENT
Start: 2019-12-18 | End: 2023-11-28

## 2019-12-19 ENCOUNTER — COMMUNICATION - HEALTHEAST (OUTPATIENT)
Dept: INFECTIOUS DISEASES | Facility: CLINIC | Age: 64
End: 2019-12-19

## 2019-12-31 ENCOUNTER — COMMUNICATION - HEALTHEAST (OUTPATIENT)
Dept: INFECTIOUS DISEASES | Facility: CLINIC | Age: 64
End: 2019-12-31

## 2019-12-31 DIAGNOSIS — B69.0 NEUROCYSTICERCOSIS: ICD-10-CM

## 2020-01-15 ENCOUNTER — OFFICE VISIT - HEALTHEAST (OUTPATIENT)
Dept: INFECTIOUS DISEASES | Facility: CLINIC | Age: 65
End: 2020-01-15

## 2020-01-15 DIAGNOSIS — B69.0 NEUROCYSTICERCOSIS: ICD-10-CM

## 2020-01-15 RX ORDER — SENNOSIDES 8.6 MG
1300 CAPSULE ORAL EVERY 8 HOURS PRN
Status: ON HOLD | COMMUNITY
Start: 2020-01-15 | End: 2024-06-26

## 2020-01-15 RX ORDER — ARIPIPRAZOLE 5 MG/1
5 TABLET ORAL DAILY
Status: SHIPPED | COMMUNITY
Start: 2020-01-07

## 2020-07-15 ENCOUNTER — RECORDS - HEALTHEAST (OUTPATIENT)
Dept: LAB | Facility: CLINIC | Age: 65
End: 2020-07-15

## 2020-07-15 LAB
ALBUMIN SERPL-MCNC: 4 G/DL (ref 3.5–5)
ALP SERPL-CCNC: 87 U/L (ref 45–120)
ALT SERPL W P-5'-P-CCNC: 33 U/L (ref 0–45)
ANION GAP SERPL CALCULATED.3IONS-SCNC: 11 MMOL/L (ref 5–18)
AST SERPL W P-5'-P-CCNC: 26 U/L (ref 0–40)
BILIRUB SERPL-MCNC: 0.7 MG/DL (ref 0–1)
BUN SERPL-MCNC: 13 MG/DL (ref 8–22)
CALCIUM SERPL-MCNC: 9.2 MG/DL (ref 8.5–10.5)
CHLORIDE BLD-SCNC: 106 MMOL/L (ref 98–107)
CO2 SERPL-SCNC: 23 MMOL/L (ref 22–31)
CREAT SERPL-MCNC: 0.99 MG/DL (ref 0.7–1.3)
GFR SERPL CREATININE-BSD FRML MDRD: >60 ML/MIN/1.73M2
GLUCOSE BLD-MCNC: 97 MG/DL (ref 70–125)
POTASSIUM BLD-SCNC: 3.7 MMOL/L (ref 3.5–5)
PROT SERPL-MCNC: 7.4 G/DL (ref 6–8)
SODIUM SERPL-SCNC: 140 MMOL/L (ref 136–145)
TSH SERPL DL<=0.005 MIU/L-ACNC: 3.36 UIU/ML (ref 0.3–5)

## 2020-07-16 LAB — 25(OH)D3 SERPL-MCNC: 49.9 NG/ML (ref 30–80)

## 2020-08-27 ENCOUNTER — RECORDS - HEALTHEAST (OUTPATIENT)
Dept: ADMINISTRATIVE | Facility: OTHER | Age: 65
End: 2020-08-27

## 2021-05-27 NOTE — PROGRESS NOTES
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  None in the spine, none in the eyes, none intraventricular.  No hydrocephalus.  No seizure history.  Since none were calcified and  little  edema was seen on MRI, these likely represent viable cysts.  Therefore treatment is warranted, to mimize future complications., such as seizures, hydrocephalus etc.  He  does report headaches, without vomiting or visual changes, though unclear this is necessarily related.  Other workup included negative HIV and negative QuantiFERON gold.  Negative hepatitis B surface antigen negative hepatitis C antibody. Negative syphilis.   Completed :   Albendazole 400 mg twice daily for 14 days  Praziquantel 600 mg 3 times daily for 14 days  With prednisone  All in June 2018  Language barrier  assisted       Plan:     Follow-up MRI of the brain shows improvement but not resolution  Radiologic follow-up is typically every 6 months after treatment for neurocysticercosis  Therefore would repeat the MRI in  May, about 1 year after treatment  We will consider retreatment then if necessary    Shan Britt M.D.          Present Illness:   This is a 64 years old male with neurocysticercosis, who is presenting for follow-up.  We saw him in June and we started him on treatment time a he had at least 10 lesions but without history of seizures.  No calcifications.  We gave him a double combined regimen of albendazole praziquantel and prednisone which she did take without any complications.  He missed a few appointments afterwards he did do an MRI in December which showed improvement as below.  Fever chills.  He wears eyeglasses but otherwise no new visual changes no nausea vomiting.  He gets occasional headaches on the left side.  Note patient lives alone has no family close by.    Review of Systems  Performed and all negative except as mentioned above.    Past medical,  family, and social history reviewed, unchanged from before.        Physical Exam:     General Appearance:  Alert, cooperative, no distress, appears stated age    Head:  Normocephalic, without obvious abnormality, atraumatic   Neck no stiffness or adenopathy  Eyes no conjunctivitis or icterus   Oral cavity no thrush , ulcers or exudates   Very poor dentition   Lungs:  Clear to auscultation bilaterally    Chest Wall:  No tenderness or deformity    Heart:  Regular rate and rhythm, S1, S2 normal,no murmur, rub or gallop    Abdomen:  Soft, non-tender, bowel sounds active , no masses, no organomegaly    Extremities:  Extremities normal, atraumatic, no cyanosis or edema,     Skin:  Skin color, texture, turgor normal, no rashes or lesions    Neurologic:  Alert and oriented X 3, strength 5/5  Gait normal but he does need a cane  No tremors  Finger-nose normal       DATA     Results for orders placed or performed in visit on 12/19/18   Thyroid Stimulating Hormone (TSH)   Result Value Ref Range    TSH 1.88 0.30 - 5.00 uIU/mL         Shan Britt MD    Radiology personally reviewed  [facility]  12/12/2018  CT scan  Compared to March 2018  Interval collapse of the cystic nodules  Associated peripheral enhancement  One lesion decreased from 12-3 mm  No longer cystic component  Majority of the lesions show similar interval progression  No new lesions

## 2021-05-28 NOTE — PROGRESS NOTES
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  None in the spine, none in the eyes, none intraventricular.  No hydrocephalus.  No seizure history.  Since none were calcified and  little  edema was seen on MRI, these likely represent viable cysts.  Therefore treatment was warranted, to mimize future complications., such as seizures, hydrocephalus etc.  He  does report headaches, without vomiting or visual changes, though unclear this is necessarily related.  Other workup included negative HIV and negative QuantiFERON gold.  Negative hepatitis B surface antigen negative hepatitis C antibody. Negative syphilis.   Completed :   Albendazole 400 mg twice daily for 14 days,  Praziquantel 600 mg 3 times daily for 14 days,  With prednisone,  All in June 2018  Language barrier  assisted, does have home nursing   followup MRI x2 : improvement but no resolution       Plan:   Let's do another round of antiparasitic treatment  Similar to the above with albendazole praziquantel and prednisone for 2 weeks  (prednisone to minimize risk of seizures)  He will need the help of the home nurse sure he is taking the medications correctly  Repeat MRI in 6-month  Follow with us in 7-month or so  T solium serology, CBC LFT    Shan Britt M.D.      Present Illness:   This is a 64 years old male with neurocysticercosis, who is presenting for follow-up.  We saw him in June and we started him on treatment; at the  time a he had at least 10 lesions but without history of seizures.  No calcifications.  We gave him a double combined regimen of albendazole praziquantel and prednisone which he did take without any complications.  He missed a few appointments afterwards he did do an MRI in December which showed improvement as below.  followup MRI shows improvement but no resolution  No Fever chills.  He wears eyeglasses but otherwise no new visual changes no nausea  vomiting.  He gets occasional headaches on the left side.twice/week.    Social : patient lives alone has no family close by.   gets home nursing  Looks like [facility] primary care at the entire clinic       Review of Systems  Performed and all negative except as mentioned above.    Past medical, family, and social history reviewed, unchanged from before.        Physical Exam:     General Appearance:  Alert, cooperative, no distress, appears stated age    Head:  Normocephalic, without obvious abnormality, atraumatic   Neck no stiffness or adenopathy  Eyes no conjunctivitis or icterus   Poor dentition   Lungs:  Clear to auscultation bilaterally    Chest Wall:  No tenderness or deformity    Heart:  Regular rate and rhythm, S1, S2 normal,no murmur, rub or gallop    Abdomen:  Soft, non-tender, bowel sounds active , no masses, no organomegaly    Extremities:  Extremities normal, atraumatic, no cyanosis or edema,     Skin:  Skin color, texture, turgor normal, no rashes or lesions    Neurologic:  Alert and oriented X 3, strength 5/5  Gait normal but he does need a cane  No tremors  Finger-nose normal       DATA     Results for orders placed or performed in visit on 04/24/19   H. pylori Antigen, Stool(HPSAG)   Result Value Ref Range    Specimen Description Feces     H pylori Antigen SEE NOTES     Report Status FINAL 04/26/2019        Radiology   EXAM DATE:         05/02/2019     EXAM: MRI HEAD W/O and WITH CONTRAST  LOCATION: [facility]  DATE/TIME: 5/2/2019 7:45 AM     INDICATION: Cysticercosis of central nervous system  COMPARISON: MRI brain 12/12/2018.  CONTRAST: 12ML OF DOTAREM WAS ADMINISTERED FROM A SINGLE USE VIAL WITH 3 ML  DISCARDED  TECHNIQUE: MRI brain without and with IV contrast.     FINDINGS:  No abnormal restricted diffusion to suggest acute infarct. Again demonstrated  are numerous circumscribed T2 hypointense lesions within the cerebral  hemispheric white matter in the cerebellar hemispheres.  Edema within the  previously described nodule within the posterior medial temporal lobe on the  left has decreased.  Postcontrast images demonstrate numerous foci of peripheral  enhancement throughout the cerebral and cerebellar hemispheres bilaterally which  are stable to slightly decreased. In the setting of neurocysticercosis, the  overall constellation of findings are consistent with evolving lesions  associated with neurocysticercosis predominantly in the granular nodular stage.  Most of these lesions are most conspicuous on postcontrast sagittal series 13.  The ventricles and sulci are prominent consistent with mild brain parenchymal  volume loss. Few punctate foci of signal abnormality within the cerebral  hemispheric white matter which are nonspecific, though most commonly ascribed to  chronic small vessel ischemic disease. No evidence of acute intracranial  hemorrhage, mass effect, or extra-axial collection.     Expected signal voids within the distal vertebral, basilar, and bilateral  internal carotid arteries.     The globes are unremarkable. The partially imaged paranasal sinuses, mastoid air  cells and middle ear cavities are unremarkable. The visualized skull base and  calvarium are unremarkable.     CONCLUSION:  1.  Continued evolution of previously demonstrated T2 hypointense lesions with  peripheral enhancement throughout the cerebral and cerebellar hemispheres. In  the context of neurocysticercosis sarcoidosis, these findings are consistent  with neurocysticercosis predominantly in the granular nodular stage. Edema  within the previously described nodule within the posterior medial temporal lobe  on the left has decreased.  2.  No evidence of acute intracranial hemorrhage, mass effect, or infarction.  3.  Mild nonspecific white matter changes.  4.  Mild brain parenchymal volume loss.                   Shan Britt MD

## 2021-05-29 NOTE — TELEPHONE ENCOUNTER
I called Zamzam, she states that the amount of tabs does not reflex the sig. I confirmed with Dr Britt that the sig is correct. I informed Zamzam the pt should have 81 tabs to complete the RX. She will adjust.

## 2021-05-29 NOTE — TELEPHONE ENCOUNTER
Dr. Britt patient    Zamzam bonilla/ [facility] calling to clarify Prednisone rx.    Zamzam @ [phone]

## 2021-05-30 VITALS — WEIGHT: 125 LBS | BODY MASS INDEX: 24.01 KG/M2

## 2021-05-30 VITALS — BODY MASS INDEX: 24.01 KG/M2 | WEIGHT: 125 LBS

## 2021-05-30 VITALS — BODY MASS INDEX: 24.39 KG/M2 | WEIGHT: 127 LBS

## 2021-05-31 VITALS — WEIGHT: 124 LBS | BODY MASS INDEX: 23.82 KG/M2

## 2021-05-31 VITALS — BODY MASS INDEX: 23.63 KG/M2 | WEIGHT: 123 LBS

## 2021-05-31 VITALS — WEIGHT: 125.04 LBS | BODY MASS INDEX: 24.02 KG/M2

## 2021-05-31 VITALS — WEIGHT: 129 LBS | BODY MASS INDEX: 24.18 KG/M2

## 2021-05-31 VITALS — BODY MASS INDEX: 23.05 KG/M2 | WEIGHT: 123 LBS

## 2021-05-31 VITALS — HEIGHT: 61 IN | WEIGHT: 121 LBS | BODY MASS INDEX: 22.84 KG/M2

## 2021-05-31 VITALS — BODY MASS INDEX: 24.17 KG/M2 | WEIGHT: 128 LBS | HEIGHT: 61 IN

## 2021-05-31 VITALS — BODY MASS INDEX: 23.22 KG/M2 | WEIGHT: 123 LBS | HEIGHT: 61 IN

## 2021-05-31 VITALS — WEIGHT: 123 LBS | BODY MASS INDEX: 23.05 KG/M2

## 2021-06-01 VITALS — HEIGHT: 61 IN | BODY MASS INDEX: 23.41 KG/M2 | WEIGHT: 124 LBS

## 2021-06-01 VITALS — BODY MASS INDEX: 23.41 KG/M2 | HEIGHT: 61 IN | WEIGHT: 124 LBS

## 2021-06-01 VITALS — WEIGHT: 124.7 LBS | HEIGHT: 61 IN | BODY MASS INDEX: 23.54 KG/M2

## 2021-06-02 VITALS — BODY MASS INDEX: 24.55 KG/M2 | WEIGHT: 131 LBS

## 2021-06-03 VITALS — WEIGHT: 131 LBS | BODY MASS INDEX: 24.55 KG/M2

## 2021-06-04 VITALS
HEART RATE: 68 BPM | TEMPERATURE: 97.9 F | SYSTOLIC BLOOD PRESSURE: 116 MMHG | BODY MASS INDEX: 24.74 KG/M2 | WEIGHT: 132 LBS | DIASTOLIC BLOOD PRESSURE: 68 MMHG

## 2021-06-04 VITALS
TEMPERATURE: 98.1 F | SYSTOLIC BLOOD PRESSURE: 130 MMHG | BODY MASS INDEX: 24.36 KG/M2 | WEIGHT: 130 LBS | HEART RATE: 96 BPM | DIASTOLIC BLOOD PRESSURE: 72 MMHG

## 2021-06-04 NOTE — TELEPHONE ENCOUNTER
lvmtcb if any questions. Left vm for Cem Chairez was contacted and informed to tell pt of upcoming appointment. MRI was received.

## 2021-06-04 NOTE — TELEPHONE ENCOUNTER
Spoke with [facility] and confirmed they will have prednison, praziquantel and albendazole delivered to the patient tomorrow (12/20/2019).

## 2021-06-04 NOTE — TELEPHONE ENCOUNTER
Patient preference:    Home Healthcare Nurse [place]: [phone]  Every Tuesday to set up pt's medication    : Cem Guerrero [phone]

## 2021-06-04 NOTE — TELEPHONE ENCOUNTER
LVMTCB Kimmie Paez coordinator. Contacting pt primary for updated phone number. Person answering pt's current phone listing informs theres no one of that name.

## 2021-06-04 NOTE — PROGRESS NOTES
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  None in the spine, none in the eyes, none intraventricular.  No hydrocephalus.  No seizure history.  Since none were calcified and  little  edema was seen on MRI, these likely represent viable cysts.  Therefore treatment was warranted, to mimize future complications., such as seizures, hydrocephalus etc.  He  does report headaches, without vomiting or visual changes, though unclear this is necessarily related.  Other workup included negative HIV and negative QuantiFERON gold.  Negative hepatitis B surface antigen negative hepatitis C antibody. Negative syphilis.   Completed :   Albendazole 400 mg twice daily for 14 days,  Praziquantel 600 mg 3 times daily for 14 days,  With prednisone,  All in June 2018  Ordered repeat antiparasitic course May 2019  Language barrier  assisted, does have home nursing , lives alone  followup MRI x2 : improvement but no resolution   Yoselyn for T solium negative but unfortunately inaccurate       Plan:     We discovered today that he never picked up his prescription from [facility] in May.  Therefore we will reorder albendazole praziquantel prednisone  Discussed options:   1 option was to talk to [facility] specialty pharmacy make him an appointment with them so that they go over the treatment  We decided on the alternative:  We  Communicated today directly with his home nurse so that she makes sure she helps him take the medication  followup in 4 weeks    Shan Britt M.D.          Present Illness:   This is a 64 years old male with neurocysticercosis, who is presenting for follow-up.  We saw him in in the past and we started him on treatment; at the  time a he had at least 10 lesions but without history of seizures.  No calcifications.  We gave him a double combined regimen of albendazole praziquantel and prednisone which he did take without any  complications.  He missed a few appointments afterwards he did do an MRI in December which showed improvement as below.  followup MRI shows improvement but no resolution  No Fever chills.  He wears eyeglasses but otherwise no new visual changes no nausea vomiting.  He gets occasional headaches on the left side.twice/week.  He then decided to reorder the antiparasitic treatment in May given MRI results  We repeated the MRI this month but did not show resolution  We discovered today that he did not  his prescription  He lives alone has no help except visiting nurse once a week  He needs     Does not report today any headaches, or focal weakness  No visual blurring    Social : patient lives alone has no family close by.   gets home nursing  Looks like [facility] primary care at the entire clinic    Review of Systems  Performed and all negative except as mentioned above.    Past medical, family, and social history reviewed, unchanged from before.        Physical Exam:     General Appearance:  Alert, cooperative, no distress, appears stated age    Head:  Normocephalic, without obvious abnormality, atraumatic   Neck no stiffness or adenopathy  Eyes no conjunctivitis or icterus   Poor dentition   CV No edema           Abdomen:  Soft, non-tender, bowel sounds active , no masses, no organomegaly    Extremities:  Extremities normal, atraumatic, no cyanosis or edema,     Skin:  Skin color, texture, turgor normal, no rashes or lesions    Neurologic:  Alert and oriented X 3, strength 5/5  Gait normal but he does need a cane  No tremors  Finger-nose normal          DATA     Results for orders placed or performed in visit on 11/20/19   Uric Acid   Result Value Ref Range    Uric Acid 7.1 3.0 - 8.0 mg/dL         Shan Britt MD

## 2021-06-04 NOTE — TELEPHONE ENCOUNTER
Mihaela w/[facility] called. The phone number we have on file is the number they have.   Cem Guerrero is the patients personal  and his phone number is [phone].    Mihaela also wanted to confirm that we have received the patients MRI results.   It was faxed twice.  Patient is scheduled for an appointment tomorrow.       Mihaela @ [phone]

## 2021-06-05 NOTE — PROGRESS NOTES
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  None in the spine, none in the eyes, none intraventricular.  No hydrocephalus.  No seizure history.  Since none were calcified and  little  edema was seen on MRI, these likely represent viable cysts.  Therefore treatment was warranted, to mimize future complications., such as seizures, hydrocephalus etc.  He  does report headaches, without vomiting or visual changes, though unclear this is necessarily related.  Other workup included negative HIV and negative QuantiFERON gold.  Negative hepatitis B surface antigen negative hepatitis C antibody. Negative syphilis.   Completed :   Albendazole 400 mg twice daily for 14 days,  Praziquantel 600 mg 3 times daily for 14 days,  With prednisone,  All in June 2018  Ordered repeat antiparasitic course May 2019  Language barrier  assisted, does have home nursing , lives alone  followup MRI x2 : improvement but no resolution   Yoselyn for T solium negative but unfortunately inaccurate       Plan:   We called the home nurse today who did confirm that he did take his albendazole praziquantel  We will help arrange for an MRI of the head in 6-month     Shan Britt M.D.          Present Illness:   This is a 64 years old male with neurocysticercosis, who is presenting for follow-up.  We saw him in in the past and we started him on treatment; at the  time a he had at least 10 lesions but without history of seizures.  No calcifications.  We gave him a double combined regimen of albendazole praziquantel and prednisone which he did take without any complications.  He missed a few appointments afterwards he did do an MRI in December which showed improvement as below.  followup MRI shows improvement but no resolution  No Fever chills.  He wears eyeglasses but otherwise no new visual changes no nausea vomiting. Occasional headaches then.  We then decided to reorder  the antiparasitic treatment in May given MRI results  We repeated the MRI : did not show resolution  We discovered last time that he did not  his prescription  He lives alone has no help except visiting nurse once a week  He needs      Last visit we reordered the prescription  Does not report today any headaches, or focal weakness  No visual blurring           Social : patient lives alone has no family close by.   gets home nursing  Looks like [facility] primary care at the entire clinic       Review of Systems  Performed and all negative except as mentioned above.    Past medical, family, and social history reviewed, unchanged from before.        Physical Exam:     General Appearance:  Alert, cooperative, no distress, appears stated age    Head:  Normocephalic, without obvious abnormality, atraumatic   Neck no stiffness or adenopathy  Eyes no conjunctivitis or icterus   Oral cavity no thrush , ulcers or exudates    Lungs:  Clear to auscultation bilaterally            Abdomen:  Soft, non-tender, bowel sounds active , no masses, no organomegaly    Extremities:  Extremities normal, atraumatic, no cyanosis or edema,     Skin:  Skin color, texture, turgor normal, no rashes or lesions    Neurologic:  Alert and oriented X 3, strength 5/5  Gait normal but he does need a cane  No tremors  Finger-nose normal       DATA     Results for orders placed or performed in visit on 11/20/19   Uric Acid   Result Value Ref Range    Uric Acid 7.1 3.0 - 8.0 mg/dL         Shan Britt MD
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
## 2021-06-11 NOTE — PROGRESS NOTES
7-5-17  My Clinic Care Coordination Care Plan    [facility]  [address] [place]  [address]  [phone]    My Preferred Method of Contact:  Phone: [phone]    My Primary/Preferred Language:  Simona    Preferred Learning Style:  Face to face discussion, Pictures/Diagrams and Hands on teaching    Emergency Contact: Extended Emergency Contact Information  Primary Emergency Contact: Bernardo Yeh  Address: [address] [place] [address] [place]  Mobile Phone: [phone]  Relation: Cousin     PCP:  Lawson Martin MD  Care Team            Lawson Martin MD PCP - General, Family Medicine    [phone]     Porfirio Reed (Simona )     [phone]     Preferred Simona  to coordinate appointments     BALDO signed: 6-28-17    Bernarod Yeh (cousin)     patient's cousin   support family member     Elda Naranjo [facility] Care Coordination Care Guide, Clinic Care Coordination    [facility]   [phone]                      Fax: [phone]    Hla Momo (Nephew)     [phone]     patient's nephew   BALDO Signed: 6-28-17    Geraldo Dianne (Nephew)     patient's nephew    [facility] Release of Information     [facility] Release of Information   BALDO Signed: 6-28-17     [facility]       therapy Service   BALDO signed: 6-28-17          Hospitalizations and/or ED Visits  Most Recent: none    Previous: none  Reason:  none    Concerns regarding my health : stomach pain, limited financial support, stable housing, support at home, quit smoking, PCA services, can't pay for co-pays.    Advanced Directive/Living Will: The patient was given information regarding Adanced Directives/Living Will      Pah elected to enroll in the [facility] Care Coordination.  Pah was given a copy of the Clinic Care Coordination brochure and full description of how to access their care team both during clinic hours and after hours.   My Care Guide's Name and Phone Number:  SUHAIL Becker; [phone].      The Care Guide and myself agreed to be in contact monthly.      Medication Update  The patient's medication list is up to date per Dr. Martin    Health Maintenance and Immunization Update  The patient's preventive health screenings and immunizations are up to date per Dr Martin.   NEED Tdap    My Emergency Plan  Emergency Plan:  Depression  Everyone feels down at times. The blues are a natural part of life. But an unhappy period that s intense or lasts for more than a couple of weeks can be a sign of depression. Depression is a serious illness. It can disrupt the lives of family and friends. If you know someone you think may be depressed, find out what you can do to help.     Know the serious signals  Warning signals for suicide include:    Threats or talk of suicide    Statements such as  I won t be a problem much longer  or  Nothing matters     Giving away possessions or making a will or  arrangements    Buying a gun or other weapon    Sudden, unexplained cheerfulness or calm after a period of depression  If you notice any of these signs, get help right away. Call a health care professional, mental health clinic, or suicide hotline and ask what action to take. In an emergency, don t hesitate to call the police.     [facility] Mental Health Crisis Lines:  [facility] [phone]  [facility] [phone]  [facility] [phone]  [facility] [phone]  [facility], Adults [phone]  [facility], Children [phone]  [facility], Adults [phone]  [facility], Children [phone]     Preventing Falls    Having a health problem can make you more likely to fall. Taking certain kinds of medicines may also increase your risk of falls. So, improving your health can help you avoid a fall. Work with your healthcare provider to manage health problems and to review your medicines. If you have your health under control, your risk of falling is lessened.     When to call  your healthcare provider  Be sure to call your healthcare provider if you fall. Also call if you have any of these signs or symptoms (someone else may need to point them out to you):    Feeling lightheaded or dizzy more than once a day    Losing your balance often or feeling unsteady on your feet    Feeling numbness in your legs or feet, or noticing a change in the way you walk    Having a steady decline in your memory or mental sharpness     Managing Chronic Pain: Medicines  Medicines can help you live better with chronic pain. You may use over-the-counter or prescription medicines. It can take some time and trial and error to work out the best treatment plan for you. Work with your health care provider to find the best medicines for you, and to use them safely and effectively.  Tell your health care provider about all medicines you`re taking, including herbs and vitamins.   A part of your treatment plan  Depending on your situation and the type of pain, you may take medicines:    At times when pain is more intense than usual.    For pain relief throughout the day.    Before activities that tend to trigger pain, like going shopping or doing physical therapy.     To decrease sensitivity to pain and help you sleep.  There are 4 major groupings of medicines for the treatment of chronic pain:  Non-opioids. These include the commonly used medicine acetaminophen as well as the non-steroidal anti-inflammatory drugs (NSAIDs), like aspirin and ibuprofen, naproxen sodium, and ketoprofen. These all help control pain but NSAIDs also help relieve inflammation. These drugs are available over-the-counter. Some NSAIDS are available by prescription only.  Acetaminophen can cause liver damage if taken above the recommended dose. NSAIDs may cause stomach problems like bleeding ulcers. Using them over the long-term can cause heart problems and stroke in a very small number of people. None of these drugs is addictive.  Opioids. This  includes drugs, like morphine, oxycodone, codeine, fentanyl, and methadone. Opioids may be used to treat breakthrough pain or severe chronic pain. Opioids are available only by prescription. These medicines may be effective for managing chronic pain. But they are controversial because of their side effects and because they can be addictive.    Adjuvants. This group includes medicines that were originally made to treat other conditions, but were also found to relieve pain. Examples of adjuvant drugs include antidepressants and anticonvulsants.  Antidepressants. These help pain by working on the same brain chemicals that play a role in depression. They also help improve sleep. Tricyclic antidepressants are 1 group of antidepressants used for treating chronic pain caused by nerve injury (neuropathic pain). Examples include amitriptyline, nortriptyline, and desipramine. Serotonin-norepinephrine reuptake inhibitors (SNRIs), like duloxetine and milnacipran, are also used.  Some types of antidepressants are used in low doses for sleep problems. They may also be prescribed if you are very sensitive to pain or some kinds of nerve pain.  Anticonvulsants, developed to prevent seizures, can help certain pain conditions, particularly nerve (neuropathic) pain. Examples include gabapentin and pregabalin.  Other pain medicines    Topical. These medicines, like lidocaine or capsaicin, are applied to the skin to treat pain in one location.    Muscle relaxants. These may be used to stop painful muscle spasms. Drugs like cyclobenzaprine can be sedating.  Taking medicine safely    Take your medicine on time and in the right dose as prescribed.    Tell your health care professional if your medicine doesn't relieve your pain or work for a long enough time, or if you have side effects.    Don't take other people's medicines. They may not be safe for you.  Avoid alcohol, tobacco, and illegal drugs. These may interact with your medicines  causing you harm.          All [facility] clinic patients have access to a Nurse 24 hours a day, 7 days a week.  If you have questions or want advice from a Nurse, please know [facility] is here for you.  You can call your clinic and they will connect you or you can call [facility] at [phone].  [facility] also has Walk In Care clinics in multiple locations.  Call the number listed above for more information about our Walk In Care clinics or visit the [facility] website at [email or web address].    Patient Support  I will ask my emergency contact for help in supporting me in these goals.  Relationship to patient: cousin  Home # :[phone], best time to call afternoon.

## 2021-06-11 NOTE — PROGRESS NOTES
"6-8-17  Clinic Care Guide called the patient  today at the request of the PCP to discuss possible clinic care coordination enrollment.  [facility] Care Guide [phone].  If this patient is returning our call please transfer to [phone]    Reviewed patient's chart.   Patient met with clinic  Belle Oh on 5-23-17.  Belle Frazier documented \" I met with [facility] today. He is on [place] and receiving $200.00 a month cash, $180.00 SNAP and MCHP. He is living with a 40 year old friend and pays him $200.00 a month rent which leaves him no disposal income for medication and dr office co-pays.We discussed his living situation and he was very interested in having his own place. We completed an online application for [facility]. They are currently taking applications for efficiencies and one bedroom apartments in the [employer] buildings. There is a  component available at the [employer]. He is also wanting to be evaluated for a cane for walking. He said he has had surgeries in his spine. I told him I would let his doctor know. I printed out the application confirmation for him and will have a copy scanned into the record. \"    Will reach out to patient to confirm if he still needs help or interested in CCC.      CCC Referral from PCP.  Subject: CCC Referral                                   Mario would like to Enroll pt to [facility]. Please call and set up appointment for pt if possible. Pt is aware programs Per Mario pt needs better insurance.   "

## 2021-06-11 NOTE — PROGRESS NOTES
Clinic Care Coordination RN Assessment:  The patient is here with his nephew, SUSHANT Barr, today for an initial assessment for enrollment into clinic care coordination. See Goals for primary care coordination concerns  Action Plan:  Total PCAM Score: 35; Moderate initial need for RN or SW intervention. Recommend shared RN and Care Guide coordination and support until stable. Care Team connect at Care Conference for progress update and assessment of ongoing needs.     RN Will:  1) Add the patient to RN Tracker for over site of care coordination needs and to offer education as needed.   2) Discuss with [facility] plan for transfer of care coordination needs to CCC team to avoid duplication of care.   3) Schedule consult with [facility] SW to discuss getting PCA services    Care Guide Will:  Care Guide Delegation:  1) Due Date: Within 2 week from the RN assessment visit- Scheduled for 7/5/17  Delegation: Help the patient to coordinate goal setting visit if not already coordinated.     2) Due Date: At Goal setting visit      Delegation: Review goals set at PCAM visit and create or add to action plan.     3) Due Date: At goal setting visit      Delegation: Discuss with the patient and the PCP that if he feels he needs PCA services he should maybe not be pursuing independent living.      Will:   Delegation  1) Due Date: By July 7, 2017  Delegation: Connect with the [facility] for hand off of care. She has met with the patient once about housing.       Goals        Patient Stated      I want to quit smoking. (pt-stated)            Action steps to achieve this goal  1.      Date goal set:  6/28/17        I want to start the process to obtain my [place] citizenship.  (pt-stated)            Action steps to achieve this goal  1.  I will discuss this with my therapist at [facility] next week.     Date goal set:  6/28/17        I would like auto-refills from [facility] (pt-stated)            Action  steps to achieve this goal  1.  CCC RN will coordinate today.     Date goal set:  6/28/17        I would like to be assessed for PCA services.  (pt-stated)            Action steps to achieve this goal  1.  I will discuss this with my therapist at [facility] next week.    Date goal set:  6/28/17        I would like treatment for my depression, anxiety , insomnia and nightmares.  (pt-stated)            Action steps to achieve this goal  1.  I will attend my first therapy appointment next week at [facility].  2. I will discuss my symptoms with my PCP at my goal setting visit.     Date goal set:  6/28/17              PCAM (Patient Centered Assessment Method)     Health and Well-Being:    Physical health needs:    Chronic pain    Poor dentition- can only eat soft foods and refuses to see dentist     SOB with exertion    Unstable gait- requesting cane    Requesting assessment for PCA services    Mental Health Needs:    Reports symptoms of depression and anxiety- cries every day- per the nephew    Lifestyle/Habits    Smokes 2 cigarettes daily    Denies ETOH    Social Environment:    Home Environment:    Lives in his nephews home with 2 nephews and their wives and children    Safe secure environment    Wants to pursue getting independent housing but family does not feel this is appropriate as he needs care    Daily Activities:    Unemployed    Denies concerns requiring further investigation at this time    Social Network:    Good support from family    Otherwise no social interactions    Financial Status and Services:    He is on [place] and receiving $200.00 a month cash, $180.00 SNAP and MCHP.    Health Literacy and Communication:    Understanding of Health and Wellbeing:    Poor but family has good understanding of the patient's needs    Illiterate in all languages    Does not recognize numbers and is unable to use a phone    Engagement:    The patient is moderately engaged in health conversation    Nephew answers many of  the questions asked and seems very engaged    Compliance with Medical Recommendations to date:    Good with family support    Needs dental care but refuses      Service Coordination:    Services Needed:    Assessment for PCA services    Needs a cane    Coordination of Services:    [facility] OLIVIER to connect with Conner Sen who has met with the patient once for hand off of care.    Emergency Plan:  Depression  Everyone feels down at times. The blues are a natural part of life. But an unhappy period that s intense or lasts for more than a couple of weeks can be a sign of depression. Depression is a serious illness. It can disrupt the lives of family and friends. If you know someone you think may be depressed, find out what you can do to help.    Know the serious signals  Warning signals for suicide include:    Threats or talk of suicide    Statements such as  I won t be a problem much longer  or  Nothing matters     Giving away possessions or making a will or  arrangements    Buying a gun or other weapon    Sudden, unexplained cheerfulness or calm after a period of depression  If you notice any of these signs, get help right away. Call a health care professional, mental health clinic, or suicide hotline and ask what action to take. In an emergency, don t hesitate to call the police.    [facility] Mental Health Crisis Lines:  [facility] [phone]  [facility] [phone]  [facility] [phone]  [facility] [phone]  [facility], Adults [phone]  [facility], Children [phone]  [facility] [place], Adults [phone]  [facility], Children [phone]    Preventing Falls    Having a health problem can make you more likely to fall. Taking certain kinds of medicines may also increase your risk of falls. So, improving your health can help you avoid a fall. Work with your healthcare provider to manage health problems and to review your medicines. If you have your health under  control, your risk of falling is lessened.    When to call your healthcare provider  Be sure to call your healthcare provider if you fall. Also call if you have any of these signs or symptoms (someone else may need to point them out to you):    Feeling lightheaded or dizzy more than once a day    Losing your balance often or feeling unsteady on your feet    Feeling numbness in your legs or feet, or noticing a change in the way you walk    Having a steady decline in your memory or mental sharpness    Managing Chronic Pain: Medicines  Medicines can help you live better with chronic pain. You may use over-the-counter or prescription medicines. It can take some time and trial and error to work out the best treatment plan for you. Work with your health care provider to find the best medicines for you, and to use them safely and effectively.  Tell your health care provider about all medicines you`re taking, including herbs and vitamins.   A part of your treatment plan  Depending on your situation and the type of pain, you may take medicines:    At times when pain is more intense than usual.    For pain relief throughout the day.    Before activities that tend to trigger pain, like going shopping or doing physical therapy.     To decrease sensitivity to pain and help you sleep.  There are 4 major groupings of medicines for the treatment of chronic pain:  Non-opioids. These include the commonly used medicine acetaminophen as well as the non-steroidal anti-inflammatory drugs (NSAIDs), like aspirin and ibuprofen, naproxen sodium, and ketoprofen. These all help control pain but NSAIDs also help relieve inflammation. These drugs are available over-the-counter. Some NSAIDS are available by prescription only.  Acetaminophen can cause liver damage if taken above the recommended dose. NSAIDs may cause stomach problems like bleeding ulcers. Using them over the long-term can cause heart problems and stroke in a very small number of  people. None of these drugs is addictive.  Opioids. This includes drugs, like morphine, oxycodone, codeine, fentanyl, and methadone. Opioids may be used to treat breakthrough pain or severe chronic pain. Opioids are available only by prescription. These medicines may be effective for managing chronic pain. But they are controversial because of their side effects and because they can be addictive.    Adjuvants. This group includes medicines that were originally made to treat other conditions, but were also found to relieve pain. Examples of adjuvant drugs include antidepressants and anticonvulsants.  Antidepressants. These help pain by working on the same brain chemicals that play a role in depression. They also help improve sleep. Tricyclic antidepressants are 1 group of antidepressants used for treating chronic pain caused by nerve injury (neuropathic pain). Examples include amitriptyline, nortriptyline, and desipramine. Serotonin-norepinephrine reuptake inhibitors (SNRIs), like duloxetine and milnacipran, are also used.  Some types of antidepressants are used in low doses for sleep problems. They may also be prescribed if you are very sensitive to pain or some kinds of nerve pain.  Anticonvulsants, developed to prevent seizures, can help certain pain conditions, particularly nerve (neuropathic) pain. Examples include gabapentin and pregabalin.  Other pain medicines    Topical. These medicines, like lidocaine or capsaicin, are applied to the skin to treat pain in one location.    Muscle relaxants. These may be used to stop painful muscle spasms. Drugs like cyclobenzaprine can be sedating.  Taking medicine safely    Take your medicine on time and in the right dose as prescribed.    Tell your health care professional if your medicine doesn't relieve your pain or work for a long enough time, or if you have side effects.    Don't take other people's medicines. They may not be safe for you.  Avoid alcohol, tobacco, and  illegal drugs. These may interact with your medicines causing you harm.

## 2021-06-11 NOTE — PROGRESS NOTES
6-28-17  Met with patient and his nephew Jaleesa Barr in clinic to schedule goal setting. Co-visited with Yuliana Bourgeois RN for RN assessment to enroll in CCC.  Spoke to patient through Simona Walker  from [facility].  Explained to patient next steps to schedule appointment with PCP to review and discuss health goals.  Goal setting appt 7-5-17 at 11:20am with PCP.    Patient signed ROIs for the following:   Family members-nephew Jaleesa Barr, Geraldo Dean, nephew  Preferred Simona  Porfirio Mcbride to coordinate appointments  [facility]  DHS release to communicate with county about benefits and insurance    Goals:  -quit smoking  - wants a cane  -PCA services- scheduled to see  on 7-25-17 referral for PCA services  -apply for [place]- [place]   -depression  -have own place    Background information:  -, no children, lives with nieces and nephews      Plan:   Goal setting with PCP 7-5-17 at 11:20am

## 2021-06-11 NOTE — PROGRESS NOTES
"6-21-17  Received call from patient.  Spoke to patient through Simona  [facility].  The Clinic Care Guide called the patient today at the request of the PCP to discuss possible clinic care coordination enrollment.  Clinic Care Coordination was described to the patient and immediate needs were discussed.   The patient agreed to enrollment in Clinic Care Coordination and future appointments were scheduled for an RN care coordination assessment and an enrollment visit with the primary care physician and care guide.   The patient was provided with contact information for the clinic care guide.    Patient stated needs help to:  -apply for SSI  -\"help to feel better. can't walk has pain.\" Suggested to address symptoms with PCP. Informed him that he has follow up with PCP on 7-5-17 at 11:20am. If he feels he needs to be seen sooner to call clinic and schedule with PCP.  -did not get the cane yet.  -housing-own place    Patient gave phone to his cousin Bernardo Yeh. Cousin stated he has difficulty hearing on the phone.   Cousin stated he needs help at home, no income.     Patient requested Care Guide to call his preferred Simona  Porfirio Mcbride [phone] to schedule RN assessment.    Called and spoke to Porfirio Mcbride. Stated not available to talk and will call Care Guide back to schedule RN Assessment.  Received call from Porfirio Mcbride and mark to schedule 6-28-17 at 8 am.    RN Assessment:   6-28-17 at 8 am     Plan:   Remind of RN assessment 6-28-17   Schedule Goal setting        Patient met with Clinic  Alyssa Frazier on 5-23-17.   Alyssa noted.  \" Belle Frazier,  5/23/2017  2:00 PM    I met with [facility]war today. He is on [place] and receiving $200.00 a month cash, $180.00 SNAP and MCHP. He is living with a 40 year old friend and pays him $200.00 a month rent which leaves him no disposal income for medication and dr office co-pays.We discussed his living situation and he was very interested " "in having his own place. We completed an online application for [place] [employer]. They are currently taking applications for efficiencies and one bedroom apartments in the [employer] buildings. There is a  component available at the [employer]. He is also wanting to be evaluated for a cane for walking. He said he has had surgeries in his spine. I told him I would let his doctor know. I printed out the application confirmation for him and will have a copy scanned into the record. \"          "
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
## 2021-06-11 NOTE — PROGRESS NOTES
I met with eRre Robbins today. He is on [place] and receiving $200.00 a month cash, $180.00 SNAP and MCHP. He is living with a 40 year old friend and pays him $200.00 a month rent which leaves him no disposal income for medication and dr office co-pays.We discussed his living situation and he was very interested in having his own place. We completed an online application for [place] [employer]. They are currently taking applications for efficiencies and one bedroom apartments in the [employer] buildings. There is a  component available at the [employer]. He is also wanting to be evaluated for a cane for walking. He said he has had surgeries in his spine. I told him I would let his doctor know. I printed out the application confirmation for him and will have a copy scanned into the record.
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
## 2021-06-11 NOTE — PROGRESS NOTES
7-5-17  Met with patient for [facility] enrollment with PCP. Spoke to patient through Simona  Porfirio Silverio.  Reviewed goals from RN Assessment with patient and PCP.  Patient reported:  -help to address $15 co-pays and that he can't afford co pays. Sent referral to [facility] to look into co pays.   -saw Dustin Philip for intake at [facility] on 7-3-17. Dustin will notify of therapist and schedule appointment.  -back and legs pain. PCP sent referral for OT/PT   -want a cane.  PCP order single point cane.  -find appropriate housing-senior housing/ assisted living  -PCA services. Stated he needs help with laundry, cooking. Will meet with [facility] SW 7-25-17 regarding services  -quit smoking- smoke 5-6 cigarettes a day. Not sure about using Nicotine patches will think about it.    Currently living with a friend and paying $200/month.       PCP sent referral for PT/OT to address back and legs pain  PCP order for a single point cane and Vitamin D and multivitamin   Due for TDap    Plan:  2 weeks Follow up on insurance co-pays, cane and therapy service at [facility].  Monthly follow up     05-Aug-2019 16:30

## 2021-06-11 NOTE — PROGRESS NOTES
Programs applying for: Insurance  Case:[id number]  Financial worker:  Kaylahshellie Araya [phone], fax: [phone]        7/10/2017: Patient has [facility].  Household income: Single () No Children, only receiving $200.00. Says he doesn't receive any Social security or any other income. I'm mailing out a release of information to patient to sign and return back to the [facility]. Once I have this release, I will call [facility] to make sure he is on the correct insurance. Unsure that I can get his co-pays changed.     This subscriber is eligible for BB: [place] [employer].  Elig Type M5: [employer]  Eligibility Begin Date: 06/01/2016  Eligibility End Date: --/--/----    7/20/2017: Received BALDO. I will fax Release to the [facility] and will allow them a couple days to process this into the patients case.  I will then call the [facility] to check on this further.   7/28/2017: Left message for patients  Kaylah. Based on the information the patient provided me, it sounds like he should qualify for MA. I will clarify this with the worker, when she calls me back. If I don't hear from her by mid next week, I'll attempt to call again.   8/1/2017: left message for Kaylah . I explained the information patient provided me. I advised to call me back to see how we can best support this patient.   I received a call back from Kaylah. She wasn't sure why he would have a $15 co-pay.  Kaylah stated, that the patient needs to apply for SSI.  She said, since he needs to apply for SSI they had to bump him to [employer] [place] care because he isn't certified. I wonder if the co-pay is referring to monthly premium. I sent a staff message to both Elda and Ai as they are both following this patient with the above information provided today by Kaylah.     Referral:     Patient stated he can't afford $15 copays.     Patient is not working and gets [place] $200/month.          Wondering why he has high co pays?          Would  you help to look into insurance regarding co pays.     Thank you     Aun

## 2021-06-12 NOTE — PROGRESS NOTES
7-24-17  Called and spoke to patient's nephew Momo Lazcano. Reminded of appointment tomorrow 7-25-17 at 9:30am [facility] with  Ai Velasquez to discuss about getting PCA services.  Nephew stated he will bring patient to appointment tomorrow.   Nephew confirmed tomorrow appointment.    Plan:   connect with patient to follow up on goals tomorrow 7-25-17    Care Guide Will:  Care Guide Delegation:  1) Due Date: Within 2 week from the RN assessment visit- Scheduled for 7/5/17  Delegation: Help the patient to coordinate goal setting visit if not already coordinated. Completed 7-5-17     2) Due Date: At Goal setting visit      Delegation: Review goals set at PCAM visit and create or add to action plan. Completed 7-5-17     3) Due Date: At goal setting visit      Delegation: Discuss with the patient and the PCP that if he feels he needs PCA services he should maybe not be pursuing independent living.  Patient scheduled with OLIVIER 7-25-17 regarding PCA services.

## 2021-06-12 NOTE — PROGRESS NOTES
Assessment  Pt, pt's nephew Jaleesa Barr and  present for meeting with  to discuss PCA services. Pt is currently not eligible for PCA assessment referral due to current insurance not covering PCA services. CG informed SW that [facility] already working on insurance and checking to see if pt is eligible for other insurance programs that would cover more services. SW will meet with pt again once/if his insurance gets switched and he becomes eligible for PCA; SW will also meet with pt if he decides to apply for disability. Pt does not have a lot of medical information in chart and CG informed SW he has some poor follow-through on recommendations from PCP and going to all appts. SW educated pt that should he pursue SSI, he will need to be following through on this as the [facility] reviews medical documents--lack of will most likely result in denial of benefits. Please see Subjective section for more information.      Action Plan:    will:  1) Available as needed      Care Guide will:  Care Guide Delegation:   1)  Due Date:  N/A--after/if pt's insurance is switched (when he becomes eligible for PCA services).       Delegation: Set up appt with SW to assist with completing referral for PCA assessment. Also ask pt if he would like to apply for disability, SW can assist with intake with an agency to assist with this process.        Subjective   Pt, pt's nephew Jaleesa Barr and  present for the meeting to discuss PCA services. Pt reported he wanted to work with [employer] on [address] [facility] in [place], [place] as his PCA agency. OLIVIER called pt's insurance and discovered pt's current insurance does not cover PCA services, was informed to connect with [facility] on this. OLIVIER called [facility] to get pt's financial worker's info: Kaylah Araya [phone], fax: [phone]; pt's case #: [id number]. OLIVIER called and left a voice mail message inquiring about pt switching plans as he is needing PCA services.     OLIVIER  consulted with CG who reported [facility] has already been working on insurance and has been in contact with the [facility] on this. OLIVIER informed pt that he cannot get PCA services at this time and will need to wait and see if GARY is able to assist with switching his plan so he is eligible for PCA services.     OLIVIER inquired as to if pt is certified disabled and pt reported he is not and has not applied for disability yet. OLIVIER informed pt that if this is something he would like to do, let his CG know and CG will assist with coordinating appt with SW to get pt connected to an agency to assist with this process. SW also discussed with pt the lack of medical information HE has currently, CG discussed pt has not been following through on all appts and recommendations from PCP. OLIVIER informed pt that if he is going to pursue disability, he is going to need to follow-through on medical appts and recommendations as the [employer]SA reviews medical information/paperwork to make a decision for eligibility; since pt is lacking this information, he will most likely get denied.           Objective    Appearance: Casually dressed, well-groomed, used cane for ambulation      Behavior: Cooperative, alert.      Speech: Non-English speaking; seemed clear and ordinary.      Emotion/Affect: Calm, pleasant      Thought Processes: Relevant       Orientation: observed x3       Memory: not assessed; seemed intact       General Intellectual Abilities: not assessed       Judgment: seemed intact      Insight: seemed intact       Clinical Recommendations: Pt seems to have a good support system, may not have a good understanding of services and programs that he may qualify for (financially and medically) and will need some assist with navigating this.

## 2021-06-12 NOTE — PROGRESS NOTES
"9-1-17  Called and spoke to patient's cousin through Simona Bernstein  Language LineStated to call his home number [phone].  Called to patient at [phone] but no answer and left voice message in Simona.   Called patient at [phone]  Called and spoke to patient through Simona Bernstein .   Follow up if patient interested in applying for SSI benefit.   Patient reported:  -he is interested in applying for SSI. Notified  for support to apply for SSI.  -has medical bills and can't pay for it no money.  Explained the reasons not able to switch insurance.   -not ready to quit to smoking yet. \" I do want to quit smoking because my family don't like the smell of smoke.\" will talk to PCP about ways to quit smoking in few months.    SW for support to apply for SSI benefit.     Plan:  Monthly follow up       "
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
## 2021-06-12 NOTE — PROGRESS NOTES
9-7-17  Faxed referral to [employer] for support to apply for SSI benefits  Fax: [phone]  Telephone: [phone]      8-30-17  Call: Abdoulaye Sheridan   [phone]  RE: set up ride      Called and spoke to Jazmyne and set up transportation for 9-18-17 at 9:10am to [address] [employer] [phone]   at 8 am for 2 passengers.   Simona Meng requested.  Will call for return ride

## 2021-06-12 NOTE — PROGRESS NOTES
7-25-17  Met with patient and patient's nephew in clinic and follow up on goals and action steps. Co-visit with Ai Mojica,  about PCA services.  Spoke to patient through Simona  Juan Pablo Oswald from [facility].    Reviewed goals.   Patient reported:  -has a cane. Goal completed.  -decided to live with nephew's family for support instead of moving to live on his own.    Patient shared that he had stomach surgery back in [facility] 2[place] and has CD and document of the surgery.   Suggested patient to bring the document and CD from [facility]note 2[place] to next appointment with Dr Martin.  Discussed apply for disability benefit. Will let Care Guide know when to apply for SSI.    Patient verbally agreed to do the test and referral Dr Martin's recommended.    Care Guide coordinate with speciality  to call nephew.    Financial worker Kaylah Araya [phone], fax# [phone]      Received fax initial DA from [employer]. PCP to review and scan to chart.      Plan  Monthly follow up    Care Guide Will:  Care Guide Delegation:  1) Due Date: Within 2 week from the RN assessment visit- Scheduled for 7/5/17  Delegation: Help the patient to coordinate goal setting visit if not already coordinated. Completed 7-5-17     2) Due Date: At Goal setting visit      Delegation: Review goals set at PCAM visit and create or add to action plan. Completed 7-5-17     3) Due Date: At goal setting visit      Delegation: Discuss with the patient and the PCP that if he feels he needs PCA services he should maybe not be pursuing independent living.  Patient met with OLIVIER 7-25-17 regarding PCA services.
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
## 2021-06-12 NOTE — PROGRESS NOTES
Subjective:    Rere Robbins is a 62 y.o. male who presents for evaluation of abdominal pain.  For the past 3-4 days, he has had pain in the epigastric and right upper quadrant areas of the abdomen.  He has also had some constipation.  Last bowel movement was 2 days ago.  Normally has a bowel movement every day.  No diarrhea.  His appetite is a little less than normal.  No vomiting.  Some mild nausea.  Food does not seem to change his symptoms.  He describes the pain as primarily burning.  He says if he lays back he has less pain.  When he sits up straight or slumps forward the pain is worse.  No known sick contacts.  He ran out of his omeprazole 2-3 days ago.  No fevers.  No blood in his bowel movements or black tarry stools.    Patient Active Problem List   Diagnosis     Myofascial pain       Current Outpatient Prescriptions:      cholecalciferol, vitamin D3, (VITAMIN D3) 2,000 unit cap, Take 1 capsule by mouth daily., Disp: 100 each, Rfl: 3     diclofenac sodium (VOLTAREN) 1 % Gel, Apply to affected area 2 times per day as needed for pain, Disp: 100 g, Rfl: 0     gabapentin (NEURONTIN) 300 MG capsule, TAKE 1 CAPSULE (300 MG TOTAL) BY MOUTH AT BEDTIME., Disp: 30 capsule, Rfl: 0     guaiFENesin ER (MUCINEX) 600 mg 12 hr tablet, Take 1 tablet (600 mg total) by mouth 2 (two) times a day., Disp: 20 tablet, Rfl: 0     ibuprofen (ADVIL,MOTRIN) 200 MG tablet, Take 2-3 tablets every 6 hours as needed with food., Disp: 120 tablet, Rfl: 0     multivitamin (ONE A DAY) per tablet, Take 1 tablet by mouth daily., Disp: 120 tablet, Rfl: 2     naproxen (NAPROSYN) 500 MG tablet, Take 1 tablet (500 mg total) by mouth 2 (two) times a day with meals., Disp: 40 tablet, Rfl: 1     omeprazole (PRILOSEC) 40 MG capsule, Take 1 capsule (40 mg total) by mouth daily., Disp: 90 capsule, Rfl: 3     docusate sodium (COLACE) 100 MG capsule, Take 1 capsule by mouth 1-2 times a day, as needed for constipation., Disp: 30 capsule, Rfl: 0     Objective:    Allergies:  Review of patient's allergies indicates no known allergies.    Vitals:  Vitals:    08/02/17 1446   BP: 116/70   Pulse: 64   Resp: 28   Temp: 97  F (36.1  C)     Body mass index is 23.63 kg/(m^2).    Vital signs reviewed.  General: Patient is alert and oriented x 3, in no apparent distress  Cardiac: regular rate and rhythm, no murmurs  Pulmonary: lungs clear to auscultation bilaterally, no crackles, rales, rhonchi, or wheezing noted  Abdomen: Mild pain with palpation in the right upper quadrant and epigastric area, no hepatosplenomegaly, mildly positive Vera's sign, no pain over McBurney's point, positive bowel sounds, no masses palpable    Labs pending.    Assessment and Plan:   1.  Right upper quadrant and epigastric pain.  Differential includes GERD symptoms (either worsening or from stopping his omeprazole), gallbladder disease, liver/pancreas issues, H. pylori, ulcer, constipation, nonspecific abdominal pain.  I restarted his omeprazole.  I will follow-up with screening labs tomorrow.  If anything is suspicious for gallbladder, consider getting an ultrasound.  I will also send a prescription for Colace, because he is having some constipation.  I think constipation is contributing somewhat to his symptoms.  He understands that if symptoms worsen overnight, he should go in to the emergency room.  He does not have an acute abdomen today.    This dictation uses voice recognition software, which may contain typographical errors.
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
## 2021-06-13 NOTE — PROGRESS NOTES
10/12/17 Referral:     Pwar, Pah - Complete SMRT application to be certified disabled More Detail >>     Complete SMRT application to be certified disabled     Elda Naranjo     Sent: Tue October 10, 2017 11:46 AM     To: [facility] Financial Resource Guide [place]      Rere Robbins     MRN: [id number] :     1955     Pt Home: [phone]              Message      Per our discussion today.     Patient has copay of $15 for office visit and can't afford it.      Please see Alexia Fernández and Ai,  notes for more details.          Thank you Emani.     Aun         Will call and set up appointment with patient.     10/17/17 Checked [place]its:   [id number] [facility] PWAR   This subscriber is eligible for BB: [facility].  Elig Type M5: [employer] V  Eligibility Begin Date: 2016  Eligibility End Date: 2017  This subscriber is eligible for the following service types: Medical Care , Chiropractic , Dental Care , Hospital , Hospital - Inpatient , Hospital - Outpatient , Emergency Services , Pharmacy , Professional (Physician) Visit - Office , Vision (Optometry) , Mental Health , Urgent Care   Checked information on [facility] website and appears patient receives [employer] Financial Participation (FFP) and that he receives [employer] through this because of his permanent residence status. A prior note in patient's chart from  states patient has been in [place] 4 [place]/2 years. Since he is not on SSI this may be why he does not receive MA. Called and left vm for patient's county worker Kaylah, will look more into this issue. Emailed [facility] CG to let her know status.     10/18/17 [facility] worker called back,FRG not in the office so County worker left vm stating Pah not eligible for MA as he is not certified disabled but that he has a SMRT application completed and its processing, worker left phone # for SMRT who would not give out any information without SMRT BALDO. Emailed [facility] this information.     10/26/17  Checked MNits no change, will call [facility] worker tomorrow to reverify that patient has already applied for SMRT. Cannot call today as FRG has no voice.     10/31/17 Called [place] Worker to verify that patient has applied for SMRT since SMRT will not verify for me. Worker did not answer and voicemail full- will try back.     11/01/17 Called [place] Worker to verify that patient has applied for SMRT already, no answer, but voicemail available, left a voicemail.     11/08/17 Called [facility] a 3rd time and left voicemail    11/09/17 [facility] worker called back and reports that Pah is SMRT certified from 08/2017 to 01/2022, staff messaged [facility] CG this information. Will wait to hear back from care guide to see if anything else is needed.     11/28/17 [facility] CG notified FRG that patient is now certified disabled through SMRT, called county worker and left vm asking if patient needs to complete new certain population application or how do we get patient moved to [place] disability.     11/29/17 No return call from County Worker, Called [facility] to see if they are able to assist. Spoke with representative about patient being certified disabled and that it appears to me that he should be on MA before this but what do we need to do to assist him with getting MA disability. Representative reports she believes patient was coded incorrectly for immigration status, put FRG on hold for several minutes, was able to fix patient's immigration status and he is now eligible for MA will take a few days to show up on [place]its will wait to notify patient of this information to make sure it updates. Representative still reports patient needs to complete MD form 6696A to change MA to MA disability with now being certified disabled, will assist patient with this when assisting with renewal soon. Will check MNits daily to see if it has updated patient to MA. Will notify [facility] CG of this new information.     12/01/17  Checked case in [place]its to see if information updated, does not appear to be, will call patient and make appointment for SNAP renewal and see if I can complete MNsure application over the phone. Then will call  as well to see about apt to renew SNAP     Patient received county letter stating SNAP will end 1-1-2018.          Best to call Porfirio Mcbride ([place] ) [phone]  to schedule appointment  because patient does not have medical transportation. So Porfirio has been kind enough to  patient to his appointments until he has medical ride.          Patient [facility].           Letters are in your folder.           Aun         12/07/17 Called and spoke to Porfirio, he will call back after he is done with work to set up appointment time. Appears to already have an appointment at [facility] on 12/13 will see if I can meet with him before or after 1:00 pm or 3:00 pm.     12/08/17 Spoke with [facility] CG who reports patient has an appointment on 12/13 at 1:00, will meet with patient right after this appointment.     12/13/17 Met with patient and assisted him with MD Combined Application for SNAP & Cash assistance renewal that is due for 01/2018. Also assisted him with Application for Certain Populations to get his MA corrected from [facility]. Patient provided copy of ID and Permanent Resident Card, took copies and faxed with applications. Patient signed updated BALDO. Will check back on case later this week to urge [facility] worker to expedite.     12/19/17 Checked [place]its: FINALLY UPDATED!!!  [id number] [facility] PWAR   This subscriber has eligibility for MA: Medical Assistance.  Elig Type DX: Disabled  Eligibility Begin Date: 12/01/2017  Eligibility End Date: --/--/----  This subscriber is eligible for the following service types: Medical Care , Chiropractic , Dental Care , Hospital , Hospital - Inpatient , Hospital - Outpatient , Emergency Services , Pharmacy ,  Professional (Physician) Visit - Office , Vision (Optometry) , Mental Health , Urgent Care   This subscriber is eligible for BB: [place] [facility].  Elig Type M5: [employer] V  Eligibility Begin Date: 02/01/2017  Eligibility End Date: --/--/----  This subscriber is eligible for the following service types: Medical Care , Chiropractic , Dental Care , Hospital , Hospital - Inpatient , Hospital - Outpatient , Emergency Services , Pharmacy , Professional (Physician) Visit - Office , Vision (Optometry) , Mental Health , Urgent Care   Still shows he has [place] as well but should be straight [place]. Also received mail from [facility] patient needs to complete phone interview and provide verifications. Will call [facility] worker to ask what all is needed before calling patient.     12/27/17 Received [facility] Notices via fax from [facility] showing patient needed to provide renewal, complete interview and provide verifications, received 2nd notice dated 3 days later stating renewal complete and also received residence verification form. Faxed residence verification form to [facility] in case not already provided via fax. Will check back next week on EZ info line to make sure all appears approved and let patient know case is ongoing at this time. Should be able to close encounter at that time.     01/02/2018: Called ez info line patient should receive his SNAP on 1/11 in the amount of $192 and [place] of $203 on 01/01/18. Patients Medical has been updated to MA Disabled. Called patient and had  service leave voicemail with this information. Nothing else should be needed at this time.   Closing encounter at this time please refer the patient to G if patient has a new need prior to medical renewal.   Emani Su, Phone: [phone], Fax: [phone]

## 2021-06-13 NOTE — PROGRESS NOTES
Subjective:      Rere Robbins is a 62 y.o. male who presents for evaluation of leg pain.  He says that his right thigh and knee are pretty painful.  With further discussion, he says this is actually continuation of ongoing leg and low back pain.  Right leg hurts worse than the left.  He has had a cane for the past 3 months.  That helps with stability and mobility.  He notes that the sandals that he is wearing today decrease his pain when walking.  He notes that warm weather, and sometimes medicine, make the pain better.  Cold weather or walking a lot make the pain worse.  He describes the pain in his back and legs as sometimes shooting, other times achy.  He notes some occasional urinary incontinence.  He says this is a chronic problem, going on for 2 years.  He saw Dr. Haddad for similar concerns about 1 month ago.  She referred him to [facility] spine care.  Note reviewed today.  It looks like a referral is in process, but no appointment has been scheduled.  It also appears that an MRI was ordered for his low back.  He has no knowledge of this.  It is unclear what medicines he is taking.  He says sometimes he takes his own medicines, other times family members help him.  He does not have any pills with him today.    Patient Active Problem List   Diagnosis     Myofascial pain     GERD (gastroesophageal reflux disease)     Lumbar radiculopathy       Current Outpatient Prescriptions:      cholecalciferol, vitamin D3, (VITAMIN D3) 2,000 unit cap, Take 1 capsule by mouth daily., Disp: 100 each, Rfl: 3     diazePAM (VALIUM) 5 MG tablet, Take one tab by mouth one - 1.5 hours prior to MRI.  Bring second tab to MRI appointment, may take again 15 minutes before MRI., Disp: 2 tablet, Rfl: 0     diclofenac sodium (VOLTAREN) 1 % Gel, Apply to affected area 2 times per day as needed for pain, Disp: 100 g, Rfl: 0     DOC-Q-LACE 100 mg capsule, TAKE 1 CAPSULE BY MOUTH 1-2 TIMES A DAY AS NEEDED FOR CONSTIPATION., Disp: 30 capsule,  Rfl: 6     gabapentin (NEURONTIN) 300 MG capsule, 1 CAPSULE PO BID, Disp: 60 capsule, Rfl: 5     ibuprofen (ADVIL,MOTRIN) 200 MG tablet, TAKE 2-3 TABLETS EVERY 6 HOURS AS NEEDED WITH FOOD., Disp: 120 tablet, Rfl: 0     multivitamin (ONE A DAY) per tablet, Take 1 tablet by mouth daily., Disp: 120 tablet, Rfl: 2     naproxen (NAPROSYN) 500 MG tablet, Take 1 tablet (500 mg total) by mouth 2 (two) times a day with meals., Disp: 40 tablet, Rfl: 0     omeprazole (PRILOSEC) 40 MG capsule, Take 1 capsule (40 mg total) by mouth daily., Disp: 90 capsule, Rfl: 3     Objective:     No Known Allergies  Vitals:    10/17/17 1324   BP: 118/60   Pulse: 84   Resp: 20     Body mass index is 23.05 kg/(m^2).    Vitals reviewed as above.  General: Patient is alert and oriented x 3, in no apparent distress  Cardiac: Regular rate and rhythm, no murmurs  Pulmonary: lungs clear to ausculation, no crackles, rales, rhonchi, or wheezing  Musculoskeletal: normal 4/5 strength in major muscle groups in lower extremities bilaterally, normal foot strength, positive straight leg raise test bilaterally with right side worse than left, patient walks a normal tandem gait with the use of a cane    Assessment and Plan:     1. Bilateral leg and low back pain, chronic.  No red flags on exam or history.  Patient is not sure what medicines he is taking.  I sent him home with the list that reviewed his pain medicines.  He appears to have prescriptions for both naproxen and ibuprofen.  I made a notation that these should not be taken together.  It appears he is already in the process of a referral to spine care and an MRI, appointments made today.      This dictation uses voice recognition software, which may contain typographical errors.

## 2021-06-13 NOTE — PROGRESS NOTES
10-27-17  Attempt 1:   Care Guide called patient.  If this patient is returning my call, please transfer to [phone]  No upcoming appointment at this time    Outreach next week

## 2021-06-13 NOTE — PROGRESS NOTES
"10-9-17  Called and spoke to female family member. Stated he is not home. Said to call him at a different phone number.  She said to call him [phone].    Called patient cell phone and spoke to patient through [employer] [facility]  Language Line.  Patient reported:  - still no PCA services. No one came to assess for PCA.  - \"feeling better.\" still see therapist at [facility] every Tuesdays at 1:30pm.   - no  call about the SSI benefits.  - [facility] until November 2017. Wait until end of year to connect with [facility] for support to apply for citizenship   -still paying $15 co pays when he sees the doctor.    Referral to [facility] to see if    Plan:  -check on status of SSI benefit with [employer]  -consult with [facility] to re apply for health insurance in November for open enrollment to address co-pay $15  2 weeks follow up         Care Guide Will:  Care Guide Delegation:  1) Due Date: Within 2 week from the RN assessment visit- Scheduled for 7/5/17  Delegation: Help the patient to coordinate goal setting visit if not already coordinated. Completed 7-5-17      2) Due Date: At Goal setting visit      Delegation: Review goals set at PCAM visit and create or add to action plan. Completed 7-5-17      3) Due Date: At goal setting visit      Delegation: Discuss with the patient and the PCP that if he feels he needs PCA services he should maybe not be pursuing independent living.     Still waiting to switch to differen insurance that covers PCA services. 10-9-17  "

## 2021-06-14 NOTE — PROGRESS NOTES
11-7-17  Called patient no answer.  Attempt 1:   Care Guide called patient.    If this patient is returning my call, please transfer to [phone].  No upcoming appointments at this time.

## 2021-06-14 NOTE — PROGRESS NOTES
See [facility] notes in chart.   [facility] Financial Resource Guide  [phone]
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
## 2021-06-14 NOTE — PROGRESS NOTES
"                        Medication Therapy Management Initial Visit       ASSESSMENT AND PLAN    1. Health education - Colonoscopy prep teaching  Majority of visit spent coaching on colonoscopy prep instructions per [facility]. He was encouraged to NOT drink red gatorade. He does not read [place]ona or English. Nobody in his home does either. He was able to teach back his entire prep to me in order to facilitate understanding. He did so correctly by the end of the encounter. Stressed importance of a good prep. Discussed side effects.     2. GERD (gastroesophageal reflux disease)  Uncontrolled. No changes until endoscopy results in. Encouraged to take PPI daily. Avoid NSAIDs until after results are known    3. Lumbar radiculopathy  Uncontrolled.   -Start acetaminophen 500-1,000 mg every 6 hours as needed for pain  -Consider increasing gabapentin at follow up if experiencing neuropathic pain symptoms    4. Vitamin D Deficiency  Continue ergocalciferol 50,000 units weekly. Recheck vitamin D with next full set of labs    FOLLOW-UP PLAN  Rere was advised to follow up in 6 months or sooner, if needed          SUBJECTIVE AND OBJECTIVE  Rere Robbins is a 62 y.o. male who was referred by Lawson Martin MD for MT services.  [facility]'s chief concern today is assistance with understanding colonoscopy prep instructions.   is accompanied by a Simona , Pankaj.    Patient plans to attend an endoscopy and colonoscopy on Monday.  He has been given the entire prep per [facility], but does not know how to use it.  He brought in the appropriate medications including: Bisacodyl, MiraLAX, and magnesium.He also complains of \"sharp burning pain\" in his stomach area, for which he believes he takes omeprazole.  He did not bring in his chronic medications and he does not know them well. Hx of several abdominal injuries/surgeries.     He is complaining of significant lower back pain.  Describes pain as \"achy\", which is always worse in the winter.  " Medication and heat make pain better.  She has not been able to get Voltaren gel due to insurance coverage issues.  He does take gabapentin 300 mg twice daily, which she believes is somewhat helpful.  He occasionally takes ibuprofen, with moderate success.  He has not taken this recently as he ran out of refills.    He has been prescribed both cholecalciferol and ergocalciferol (weekly). He has been taking the ergocalciferol weekly. He has no idea if he has ever received daily cholecalciferol.     Vitamin D, Total (25-Hydroxy)   Date Value Ref Range Status   07/05/2017 29.9 (L) 30.0 - 80.0 ng/mL Final     Current Outpatient Prescriptions   Medication Sig Dispense Refill     ergocalciferol (ERGOCALCIFEROL) 50,000 unit capsule Take 50,000 Units by mouth every 7 days.       gabapentin (NEURONTIN) 300 MG capsule 1 CAPSULE PO BID 60 capsule 5     ibuprofen (ADVIL,MOTRIN) 200 MG tablet Take 2 tablets (400 mg total) by mouth every 8 (eight) hours as needed for pain (with food). 100 tablet 0     omeprazole (PRILOSEC) 40 MG capsule Take 1 capsule (40 mg total) by mouth daily. 90 capsule 3     No current facility-administered medications for this visit.        We reviewed [facility]'s medication list with them, discussing reason for use, directions for use, and potential side effects of each medication.  Indication, safety, efficacy, and convenience was assessed for all medications.     [facility] was provided with a printed AVS, and this care plan was communicated via EMR with his primary care provider, Lawson Martin MD, and is the authorizing prescriber for this visit.  Direct supervision was available by either the patient's PCP or another available physician when needed.    This note has been dictated using voice recognition software. Any grammatical or context distortions are unintentional and inherent to the software.       Time spent: 45 minutes    Alejandrina Powell, AlvaD  MTM Pharmacist at [facility]

## 2021-06-14 NOTE — PROGRESS NOTES
11-28-17  Met with patient in clinic and follow up on goals and action steps.  Spoke to patient through Simona  Porfirio Silverio.  Patient received several letters from [facility] and [facility].  Assisted and supported patient in reading the mails and explained letter from Critical access hospital stating SNAP will be closed 1-1-2018 and to re apply.   Sent referral to [facility] for support to renew SNAP.    Explained to patient that he is not eligible for RSDI and that he is approved and certified disabled by [facility].  Discussed with patient next plan since he is certified disabled by [facility].  Contact Critical access hospital worker to switch insurance to MA.    Patient reported:  -moved to friend's apartment. New address [address], [place] [address]. Updated in patient's chart.  -missed several appointments due to no medical transportation. Stated friends helped with getting to appointments.    Reminded patient he has an appointment on 12-18-17 at [facility] for upper endoscopy and colonoscopy.  Will coordinate with [facility] RN to help prep for the procedure.     Updated PCP of information above.  PCP will send referral to see Alva ShekihD for medication management.     Plan:  Monthly follow up  Will coordinate with [facility] RN to help prep for the procedure before 12-18-17

## 2021-06-15 NOTE — PROGRESS NOTES
2-6-18  Called and spoke to patient briefly. Stated he has PCA.   Patient hung up the phone before Simona  came on.  Sam .  Called patient again no answer.  Called and spoke to patient's nephew Momo Lazcano through Sam .   Stated he does not know any thing about him and that he left somewhere without letting him know.    Plan:   reach out next week to follow up on goals.
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
## 2021-06-15 NOTE — PROGRESS NOTES
12-26-17  Met with patient and patient's friend niece (Kendall Laboy) in clinic and follow up on goals and action steps.  Spoke to patient through Simona  Porfirio Monroy.  Patient brought completed Shelter Expense form for GARY Joaquin.  Patient reported:  -made it to endoscopy and colonoscopy on 12-18-17.  -received letter from social security [facility] and [facility]. Read and explained to patient that he was not eligible for SSI because he told them that he did not want to file a claim for SSI.  Patient reported he could not hear that well over the phone and he had his friend spoke on his behalf with  when social security office called to review SSI application.  Patient he has difficulty hearing and that he does not have hearing aid.      Explained to patient that he is approved for MA (Medical Assistance) and insurance will be switched to MA.    Patient signed BALDO to communicate with friend/niece Kendall Laboy [phone] for support in the community. Updated in Care Teams    Care Guide faxed the letter to [place],  at [employer] to support patient.  Care Guide faxed Shelter Expense form and [facility] letter regarding SNAP to GARY Zaragoza.       Plan:  Consult with PCP regarding difficulty hearing and if he needs to see ENT to get hearing check.  Sent message to  regarding PCA services

## 2021-06-16 PROBLEM — G89.29 CHRONIC ABDOMINAL PAIN: Status: ACTIVE | Noted: 2017-12-28

## 2021-06-16 PROBLEM — R10.9 CHRONIC ABDOMINAL PAIN: Status: ACTIVE | Noted: 2017-12-28

## 2021-06-16 PROBLEM — M79.18 MYOFASCIAL PAIN: Status: ACTIVE | Noted: 2017-06-29

## 2021-06-16 PROBLEM — B69.0 NEUROCYSTICERCOSIS: Status: ACTIVE | Noted: 2018-06-06

## 2021-06-16 PROBLEM — K21.9 GERD (GASTROESOPHAGEAL REFLUX DISEASE): Status: ACTIVE | Noted: 2017-08-02

## 2021-06-16 PROBLEM — M54.16 LUMBAR RADICULOPATHY: Status: ACTIVE | Noted: 2017-09-19

## 2021-06-16 NOTE — PROGRESS NOTES
2-20-18  Called and spoke to patient and patient's nephew (Kendall Zenia) and through Simona  Angel-Language Line and follow up on goals and action steps.  Patient gave verbal permission to speak to nephew due to hard of hearing.  Informed patient that Tammi from [employer] stated social security office called and could not reach him so they closed his claim.  Stated for him to call to [facility] office at [phone] to re-open his claim.  can't call and that he needs to call himself.  Scheduled patient to see  2-23-18 at 10am for support to call [facility] office to reopen claim.    Patient seems to not know or clear of who is helping or what services he has.  Patient at first stated he does not have PCA. Then later on says he has PCA services. Not sure how many hours.  Stated nephew is PCA (Kendall Say) not Kendall Kler.  Patient has difficulty hearing.  Had to repeat several times.  -did not complete the citizenship at [facility] will go again.  -not sure about the medical waiver. Has therapist at [facility] [employer].    Plan:  Clarify services at [facility] and if therapist helping with medical waiver.  Verify if insurance copay is $3 or $15       SW appt 2-23-18 at 10am    Other:  Assess for other services and support.    Help to call to [facility] office [phone]  to re-open claim for SSI benefit per  at [employer] (Tammi)      Are you working with any other Social workers or providers on this issue? no

## 2021-06-16 NOTE — TELEPHONE ENCOUNTER
Telephone Encounter by Lana Gamboa at 5/17/2019  8:22 AM     Author: Lana Gamboa Service: -- Author Type: Financial Resource Guide    Filed: 5/17/2019  8:55 AM Encounter Date: 5/17/2019 Status: Signed    : Lana Gamboa (Financial Resource Guide)       Medication: Albenza 200mg  QTY: 56 tabs for 14 days  Insurance: [employer]  Additional Information: [facility] PA team sent over 2 different CMM Keys (one for generic and one for name brand)

## 2021-06-16 NOTE — PROGRESS NOTES
Pt was NS1 for [facility] SW visit regarding CG encounter from 2/20/18.  Please reschedule at next outreach if needed.

## 2021-06-16 NOTE — PROGRESS NOTES
2-23-18  Called and spoke with patient through Simona : Tabe - Language Line and follow up on goals  Patient handed the phone to nephew to speak for him.  Nephew stated patient change to different clinic last month.    Patient has transferred to a non-[facility] clinic and no further outreach will be done.   I have inform patient's PCP and have removed patient from [facility] enrolled patient list.

## 2021-06-16 NOTE — TELEPHONE ENCOUNTER
Telephone Encounter by Lana Gamboa at 12/19/2019 10:23 AM     Author: Lana Gamboa Service: -- Author Type: Financial Resource Guide    Filed: 12/19/2019 10:33 AM Encounter Date: 12/19/2019 Status: Signed    : Lana Gamboa (Financial Resource Guide)       Medication: Albendazole 200mg tabs  Qty: 56 tabs for 28 days  Effective Dates: 12/19/2019 - 01/19/2020  Copay Amount: $0

## 2021-06-16 NOTE — TELEPHONE ENCOUNTER
Telephone Encounter by Lana Gamboa at 12/19/2019  7:41 AM     Author: Lana Gamboa Service: -- Author Type: Financial Resource Guide    Filed: 12/19/2019  9:31 AM Encounter Date: 12/19/2019 Status: Addendum    : Lana Gamboa (Financial Resource Guide)    Related Notes: Original Note by Lana Gamboa (Financial Resource Guide) filed at 12/19/2019  7:49 AM       Medication: Albendazole 200mg  QTY: 56 tabs for 14 days  Insurance: [employer] PMAP  Additional Information: NA                        Medication: Praziquantel 600mg  Qty: 42 tabs for 14 days  Insurance: [employer] PMAP  Test Claim: $0  Additional Information: already released to pharmacy yesterday                     Medication: Prednisone 10mg  Qty: 81 tabs for 19 days  Insurance: [employer] PMAP  Test Claim: $0.00  Additional Information: already released to pharmacy yesterday

## 2021-06-16 NOTE — TELEPHONE ENCOUNTER
Telephone Encounter by Lana Gamboa at 5/17/2019  1:47 PM     Author: Lana Gamboa Service: -- Author Type: Financial Resource Guide    Filed: 5/17/2019  2:00 PM Encounter Date: 5/17/2019 Status: Signed    : Lana Gamboa (Financial Resource Guide)       Medication: Albendazole 200mg  Qty: 56 tabs  Effective Dates: 05/17/2019 - 05/31/2019  Pharmacy: [facility] - called and informed PA approved  Copay Amount: $0.00  Patient Notified? Yes, pharmacy to notify patient

## 2021-06-18 NOTE — PROGRESS NOTES
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  None in the spine, none in the eyes, none intraventricular.  No hydrocephalus.  No seizure history.  Since none were calcified and l little to no edema was seen on MRI, these likely represent viable cysts.  Therefore treatment is warranted, to mimize future complications., such as seizures, hydrocephalus etc.  Patient was prescribed antiparasitic therapy with prednisone 2 weeks ago, but he only started them 3 days ago  Headaches     PLAN   prednisone 50 mg daily to minimize inflammatory response  Albendazole 400 mg twice daily for 14 days  Praziquantel 600 mg 3 times daily for 14 days  Rapid taper of prednisone afterwards, which can be done over subsequent 5- 6 days.  Patient has to report immediately any new neurologic signs or symptoms.  The above has been discussed in detail with the patient, his niece, via the     Follow-up 2 weeks     Shan Britt MD  [facility]  On-Call ID provider: [phone], option: 9          Present Illness:   This is a 63 years old male presenting for follow-up.  Diagnosis neurocysticercosis with headaches.  No seizure history.  No ophthalmologic involvement.  We did prescribed by therapy last visit but he only started the antiparasitic therapy 3 days ago.  His main complaint is headache.  No associated neurologic deficits.  He is presenting with his niece and the .      Review of Systems  Performed and all negative except as mentioned above.    Past medical, family, and social history reviewed, unchanged from before.        Physical Exam:     General Appearance:  Alert, cooperative, no distress, appears stated age    Head:  Normocephalic, without obvious abnormality, atraumatic   Neck no stiffness or adenopathy  Eyes no conjunctivitis or icterus    very poor dentition   Lungs:  Clear to auscultation  bilaterally    Chest Wall:  No tenderness or deformity    Heart:  Regular rate and rhythm, S1, S2 normal,no murmur, rub or gallop    Abdomen:  Soft, non-tender, bowel sounds active , no masses, no organomegaly    Extremities:  Extremities normal, atraumatic, no cyanosis or edema,     Skin:  Skin color, texture, turgor normal, no rashes or lesions    Neurologic:  Alert and oriented X 3, Moves all 4 extremities , face symmetric no obvious cranial nerve deficits, tremors  This is a cane to ambulate       DATA     Results for orders placed or performed in visit on 04/26/18   Basic Metabolic Panel   Result Value Ref Range    Sodium 141 136 - 145 mmol/L    Potassium 3.9 3.5 - 5.0 mmol/L    Chloride 106 98 - 107 mmol/L    CO2 23 22 - 31 mmol/L    Anion Gap, Calculation 12 5 - 18 mmol/L    Glucose 93 70 - 125 mg/dL    Calcium 9.8 8.5 - 10.5 mg/dL    BUN 12 8 - 22 mg/dL    Creatinine 0.82 0.70 - 1.30 mg/dL    GFR MDRD Af Amer >60 >60 mL/min/1.73m2    GFR MDRD Non Af Amer >60 >60 mL/min/1.73m2         Shan Britt MD

## 2021-06-18 NOTE — PROGRESS NOTES
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  None in the spine, none in the eyes, none intraventricular.  No hydrocephalus.  No seizure history.  Since none were calcified and l little to no edema was seen on MRI, these likely represent viable cysts.  Therefore treatment is warranted, to mimize future complications., such as seizures, hydrocephalus etc.  He  does report headaches, though unclear this is necessarily related.      PLAN  Start prednisone 50 mg daily to minimize inflammatory response, this should be started at least 24 hours before starting antiparasitics:  Albendazole 400 mg twice daily for 14 days  Praziquantel 600 mg 3 times daily for 14 days  Rapid taper of prednisone afterwards, which can be done over subsequent 5- 6 days.  Patient has to report immediately any new neurologic signs or symptoms.  The above has been discussed in detail with the patient, his niece, via the   Follow-up 2 weeks    Shan Britt MD  [facility]  On-Call ID provider: [phone], option: 9            HPI  63 years old male, non-English speaking, was referred because of neurocysticercosis.  Apparently has seen neurology initially because of persistent headaches.  He reports he has had headaches for about 7 months mostly vertex parietal.  Not associated with nausea vomiting.  MRI of the brain done at [facility] without contrast, showed multiple parenchymal cysts with intracystic nodular foci.  Most of them did not demonstrate surrounding edema although a couple are hyperintense on T2.  No cerebral edema.  No hydrocephalus.  At least 10 lesions one in the left cerebellum.  None intraventricular.  Also had spinal MRI which did not show cysticercosis.  C-spine did show disc herniations C5-C6-C7 and severe foraminal stenosis.  Patient does have some upper extremity weakness.  Thoracic spine MRI again shows  "multilevel spondylosis.  He has had chronic blurry vision dating back to about 7 years.  So he recently saw ophthalmology [place] [facility] Dr. Broussard, and exam did not document \"infection or inflammation \" in either eye.  He was recommended eyeglasses which she is not wearing.  Otherwise no history of seizures epilepsy or fevers.  Does eat pork.  Other workup included negative HIV and negative QuantiFERON gold.  Negative hepatitis B surface antigen negative hepatitis C antibody. Negative syphilis.  Apparently serology for the sodium was sent and they were positive but we do not have documentation.    ROS  All systems reviewed, negative except for the above    Current Outpatient Prescriptions on File Prior to Visit   Medication Sig Dispense Refill     acetaminophen (TYLENOL EXTRA STRENGTH) 500 MG tablet Take 2 tablets (1,000 mg total) by mouth every 6 (six) hours as needed for pain. 100 tablet 2     bisacodyl (DULCOLAX) 5 mg EC tablet   0     CLEARLAX 17 gram/dose powder   0     docusate sodium (COLACE) 100 MG capsule   6     ergocalciferol (ERGOCALCIFEROL) 50,000 unit capsule Take 50,000 Units by mouth every 7 days.       gabapentin (NEURONTIN) 300 MG capsule 1 CAPSULE PO BID 60 capsule 5     magnesium citrate solution   0     omeprazole (PRILOSEC) 40 MG capsule Take 1 capsule (40 mg total) by mouth daily. 90 capsule 3     ONE DAILY MULTI-VIT W-MINERAL Tab tablet   2     VITAMIN D3 2,000 unit capsule Take 2,000 Units by mouth daily.  3     No current facility-administered medications on file prior to visit.        No Known Allergies    Social History     Social History     Marital status: Single     Spouse name: N/A     Number of children: N/A     Years of education: N/A     Occupational History     Not on file.     Social History Main Topics     Smoking status: Current Every Day Smoker     Years: 50.00     Smokeless tobacco: Not on file      Comment: 2 cigarettes/day     Alcohol use Yes      Comment: " occasional     Drug use: No     Sexual activity: Not on file     Other Topics Concern     Not on file     Social History Narrative       Family History   Problem Relation Age of Onset     No Medical Problems Mother      No Medical Problems Father      No Medical Problems Sister      No Medical Problems Brother    Negative for neurocysticercosis      PHYSICAL EXAM  T 97.9 /58 P85    General Appearance:  Alert, cooperative, no distress, appears stated age    Head:  Normocephalic, without obvious abnormality, atraumatic   Neck no stiffness or adenopathy  Eyes no conjunctivitis or icterus   Oral cavity no thrush , ulcers or exudates   Very poor dentition     Lungs:  Clear to auscultation bilaterally    Chest Wall:  No tenderness or deformity    Heart:  Distant no s3   Abdomen:  Soft, non-tender, bowel sounds active , no masses, no organomegaly    Extremities:  Extremities normal, atraumatic, no cyanosis or edema,     Skin:  Skin color, texture, turgor normal, no rashes or lesions    Neurologic:  Alert and oriented X 3, Moves all 4 extremities   Weak upper extremities  No tremors     DATA  Results for orders placed or performed in visit on 04/26/18   Basic Metabolic Panel   Result Value Ref Range    Sodium 141 136 - 145 mmol/L    Potassium 3.9 3.5 - 5.0 mmol/L    Chloride 106 98 - 107 mmol/L    CO2 23 22 - 31 mmol/L    Anion Gap, Calculation 12 5 - 18 mmol/L    Glucose 93 70 - 125 mg/dL    Calcium 9.8 8.5 - 10.5 mg/dL    BUN 12 8 - 22 mg/dL    Creatinine 0.82 0.70 - 1.30 mg/dL    GFR MDRD Af Amer >60 >60 mL/min/1.73m2    GFR MDRD Non Af Amer >60 >60 mL/min/1.73m2       RADIOLOGY  As above

## 2021-06-19 NOTE — LETTER
Letter by Shan Britt MD at      Author: Shan Britt MD Service: -- Author Type: --    Filed:  Encounter Date: 5/15/2019 Status: (Other)         Belle Guy MD  [address] [facility] [address]                                  May 15, 2019    Patient: Pah Pwar   MR Number: [id number]   YOB: 1955   Date of Visit: 5/15/2019     Dear Dr. Malena MD:    Thank you for referring Pah Pwar to me for evaluation. Below are the relevant portions of my assessment and plan of care.    If you have questions, please do not hesitate to call me. I look forward to following Pah along with you.    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  None in the spine, none in the eyes, none intraventricular.  No hydrocephalus.  No seizure history.  Since none were calcified and  little  edema was seen on MRI, these likely represent viable cysts.  Therefore treatment was warranted, to mimize future complications., such as seizures, hydrocephalus etc.  He  does report headaches, without vomiting or visual changes, though unclear this is necessarily related.  Other workup included negative HIV and negative QuantiFERON gold.  Negative hepatitis B surface antigen negative hepatitis C antibody. Negative syphilis.   Completed :   Albendazole 400 mg twice daily for 14 days,  Praziquantel 600 mg 3 times daily for 14 days,  With prednisone,  All in June 2018  Language barrier  assisted, does have home nursing   followup MRI x2 : improvement but no resolution       Plan:   Let's do another round of antiparasitic treatment  Similar to the above with albendazole praziquantel and prednisone for 2 weeks  (prednisone to minimize risk of seizures)  He will need the help of the home nurse sure he is taking the  medications correctly  Repeat MRI in 6-month  Follow with us in 7-month or so  T solium serology, CBC LFT    Shan Britt M.D.      Present Illness:   This is a 64 years old male with neurocysticercosis, who is presenting for follow-up.  We saw him in June and we started him on treatment; at the  time a he had at least 10 lesions but without history of seizures.  No calcifications.  We gave him a double combined regimen of albendazole praziquantel and prednisone which he did take without any complications.  He missed a few appointments afterwards he did do an MRI in December which showed improvement as below.  followup MRI shows improvement but no resolution  No Fever chills.  He wears eyeglasses but otherwise no new visual changes no nausea vomiting.  He gets occasional headaches on the left side.twice/week.    Social : patient lives alone has no family close by.   gets home nursing  Looks like [facility] primary care at the entire clinic       Review of Systems  Performed and all negative except as mentioned above.    Past medical, family, and social history reviewed, unchanged from before.        Physical Exam:     General Appearance:  Alert, cooperative, no distress, appears stated age    Head:  Normocephalic, without obvious abnormality, atraumatic   Neck no stiffness or adenopathy  Eyes no conjunctivitis or icterus   Poor dentition   Lungs:  Clear to auscultation bilaterally    Chest Wall:  No tenderness or deformity    Heart:  Regular rate and rhythm, S1, S2 normal,no murmur, rub or gallop    Abdomen:  Soft, non-tender, bowel sounds active , no masses, no organomegaly    Extremities:  Extremities normal, atraumatic, no cyanosis or edema,     Skin:  Skin color, texture, turgor normal, no rashes or lesions    Neurologic:  Alert and oriented X 3, strength 5/5  Gait normal but he does need a cane  No tremors  Finger-nose normal       DATA     Results for orders placed or performed in visit on 04/24/19   H.  pylori Antigen, Stool(HPSAG)   Result Value Ref Range    Specimen Description Feces     H pylori Antigen SEE NOTES     Report Status FINAL 04/26/2019        Radiology   EXAM DATE:         05/02/2019     EXAM: MRI HEAD W/O and WITH CONTRAST  LOCATION: [facility]  DATE/TIME: 5/2/2019 7:45 AM     INDICATION: Cysticercosis of central nervous system  COMPARISON: MRI brain 12/12/2018.  CONTRAST: 12ML OF DOTAREM WAS ADMINISTERED FROM A SINGLE USE VIAL WITH 3 ML  DISCARDED  TECHNIQUE: MRI brain without and with IV contrast.     FINDINGS:  No abnormal restricted diffusion to suggest acute infarct. Again demonstrated  are numerous circumscribed T2 hypointense lesions within the cerebral  hemispheric white matter in the cerebellar hemispheres. Edema within the  previously described nodule within the posterior medial temporal lobe on the  left has decreased.  Postcontrast images demonstrate numerous foci of peripheral  enhancement throughout the cerebral and cerebellar hemispheres bilaterally which  are stable to slightly decreased. In the setting of neurocysticercosis, the  overall constellation of findings are consistent with evolving lesions  associated with neurocysticercosis predominantly in the granular nodular stage.  Most of these lesions are most conspicuous on postcontrast sagittal series 13.  The ventricles and sulci are prominent consistent with mild brain parenchymal  volume loss. Few punctate foci of signal abnormality within the cerebral  hemispheric white matter which are nonspecific, though most commonly ascribed to  chronic small vessel ischemic disease. No evidence of acute intracranial  hemorrhage, mass effect, or extra-axial collection.     Expected signal voids within the distal vertebral, basilar, and bilateral  internal carotid arteries.     The globes are unremarkable. The partially imaged paranasal sinuses, mastoid air  cells and middle ear cavities are unremarkable. The visualized  skull base and  calvarium are unremarkable.     CONCLUSION:  1.  Continued evolution of previously demonstrated T2 hypointense lesions with  peripheral enhancement throughout the cerebral and cerebellar hemispheres. In  the context of neurocysticercosis sarcoidosis, these findings are consistent  with neurocysticercosis predominantly in the granular nodular stage. Edema  within the previously described nodule within the posterior medial temporal lobe  on the left has decreased.  2.  No evidence of acute intracranial hemorrhage, mass effect, or infarction.  3.  Mild nonspecific white matter changes.  4.  Mild brain parenchymal volume loss.                   Shan Britt MD

## 2021-06-19 NOTE — PROGRESS NOTES
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  None in the spine, none in the eyes, none intraventricular.  No hydrocephalus.  No seizure history.  Since none were calcified and  little  edema was seen on MRI, these likely represent viable cysts.  Therefore treatment is warranted, to mimize future complications., such as seizures, hydrocephalus etc.  He  does report headaches, though unclear this is necessarily related.  Other workup included negative HIV and negative QuantiFERON gold.  Negative hepatitis B surface antigen negative hepatitis C antibody. Negative syphilis.        Plan:   Completed :   Albendazole 400 mg twice daily for 14 days  Praziquantel 600 mg 3 times daily for 14 days  With prednisone  I verified with [facility] pharmacy meds picked up 6/1  Follow-up in 3 months with MRI of the brain, to be done at [facility]     Shan Britt MD  [facility]  On-Call ID provider: [phone], option: 9             Present Illness:     This is a 63 years old male presenting for follow-up.  Diagnosis neurocysticercosis with headaches.  No seizure history.  No ophthalmologic involvement.  We did prescribed antiparasitic therapy, as above, which he picked up on June 1.  I verified this today with [facility] pharmacy.  Patient is a rather poor historian.  Describes some generalized aches but the headache seems to be a much less prominent complaint than during his prior visits.   No associated neurologic deficits.  He is presenting with his niece and the .  No seizures.       Review of Systems  Performed and all negative except as mentioned above.    Past medical, family, and social history reviewed, unchanged from before.        Physical Exam:     General Appearance:  Alert, cooperative, no distress, appears stated age    Head:  Normocephalic, without obvious abnormality, atraumatic   Neck no stiffness or  adenopathy  Eyes no conjunctivitis or icterus   Oral cavity no thrush , ulcers or exudates very poor dentition   Lungs:  Clear to auscultation bilaterally        Heart:  Regular rate and rhythm, S1, S2 normal,no murmur, rub or gallop    Abdomen:  Soft, non-tender, bowel sounds active , no masses, no organomegaly    Extremities:  Extremities normal, atraumatic, no cyanosis or edema,   Some deformed toes.   Skin:  Skin color, texture, turgor normal, no rashes or lesions    Neurologic:  Alert and oriented X 3, Moves all 4 extremities , cranial nerves intact.  Gait is slow secondary to old hip fracture       DATA     Results for orders placed or performed in visit on 04/26/18   Basic Metabolic Panel   Result Value Ref Range    Sodium 141 136 - 145 mmol/L    Potassium 3.9 3.5 - 5.0 mmol/L    Chloride 106 98 - 107 mmol/L    CO2 23 22 - 31 mmol/L    Anion Gap, Calculation 12 5 - 18 mmol/L    Glucose 93 70 - 125 mg/dL    Calcium 9.8 8.5 - 10.5 mg/dL    BUN 12 8 - 22 mg/dL    Creatinine 0.82 0.70 - 1.30 mg/dL    GFR MDRD Af Amer >60 >60 mL/min/1.73m2    GFR MDRD Non Af Amer >60 >60 mL/min/1.73m2         Shan Britt MD

## 2021-06-20 NOTE — LETTER
Letter by Shan Britt MD at      Author: Shan Britt MD Service: -- Author Type: --    Filed:  Encounter Date: 12/18/2019 Status: Signed         Belle Guy MD  [address]  [facility] [address]                                  December 18, 2019    Patient: Pah Pwar   MR Number: [id number]   YOB: 1955   Date of Visit: 12/18/2019     Dear Dr. Malena MD:    Thank you for referring Pah Pwar to me for evaluation. Below are the relevant portions of my assessment and plan of care.    If you have questions, please do not hesitate to call me. I look forward to following Pah along with you.    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  None in the spine, none in the eyes, none intraventricular.  No hydrocephalus.  No seizure history.  Since none were calcified and  little  edema was seen on MRI, these likely represent viable cysts.  Therefore treatment was warranted, to mimize future complications., such as seizures, hydrocephalus etc.  He  does report headaches, without vomiting or visual changes, though unclear this is necessarily related.  Other workup included negative HIV and negative QuantiFERON gold.  Negative hepatitis B surface antigen negative hepatitis C antibody. Negative syphilis.   Completed :   Albendazole 400 mg twice daily for 14 days,  Praziquantel 600 mg 3 times daily for 14 days,  With prednisone,  All in June 2018  Ordered repeat antiparasitic course May 2019  Language barrier  assisted, does have home nursing , lives alone  followup MRI x2 : improvement but no resolution   Yoselyn for T solium negative but unfortunately inaccurate       Plan:     We discovered today that he never picked up his prescription from [facility] back in May.  Therefore we will reorder  albendazole praziquantel prednisone  Discussed options:   1 option was to talk to [facility] specialty pharmacy make him an appointment with them so that they go over the treatment  We decided on the alternative:  We  Communicated today directly with his home nurse so that she makes sure she helps him take the medication  followup in 4 weeks    Shan Britt M.D.          Present Illness:   This is a 64 years old male with neurocysticercosis, who is presenting for follow-up.  We saw him in in the past and we started him on treatment; at the  time a he had at least 10 lesions but without history of seizures.  No calcifications.  We gave him a double combined regimen of albendazole praziquantel and prednisone which he did take without any complications.  He missed a few appointments afterwards he did do an MRI in December which showed improvement as below.  followup MRI shows improvement but no resolution  No Fever chills.  He wears eyeglasses but otherwise no new visual changes no nausea vomiting.  He gets occasional headaches on the left side.twice/week.  He then decided to reorder the antiparasitic treatment in May given MRI results  We repeated the MRI this month but did not show resolution  We discovered today that he did not  his prescription  He lives alone has no help except visiting nurse once a week  He needs     Does not report today any headaches, or focal weakness  No visual blurring    Social : patient lives alone has no family close by.   gets home nursing  Looks like [facility] primary care at the entire clinic    Review of Systems  Performed and all negative except as mentioned above.    Past medical, family, and social history reviewed, unchanged from before.        Physical Exam:     General Appearance:  Alert, cooperative, no distress, appears stated age    Head:  Normocephalic, without obvious abnormality, atraumatic   Neck no stiffness or adenopathy  Eyes no conjunctivitis or  icterus   Poor dentition   CV No edema           Abdomen:  Soft, non-tender, bowel sounds active , no masses, no organomegaly    Extremities:  Extremities normal, atraumatic, no cyanosis or edema,     Skin:  Skin color, texture, turgor normal, no rashes or lesions    Neurologic:  Alert and oriented X 3, strength 5/5  Gait normal but he does need a cane  No tremors  Finger-nose normal          DATA     Results for orders placed or performed in visit on 11/20/19   Uric Acid   Result Value Ref Range    Uric Acid 7.1 3.0 - 8.0 mg/dL         Shan Britt MD

## 2021-07-17 NOTE — PROGRESS NOTES
"6-20-17  Attempt #2  Called and spoke patient's cousin Rere Flowers through [employer]. Stated patient is not home.  Left message with cousin to have patient call Care Guide-Aun at [phone].   Cousin took down the telephone and will have patient call back tomorrow.    Will outreach to patient tomorrow.   Next follow up appt with PCP 7-5-17 at 11:20 am.      6-8-17  Clinic Care Guide called the patient  today at the request of the PCP to discuss possible clinic care coordination enrollment.  [facility] Care Guide [phone].  If this patient is returning our call please transfer to [phone]    Reviewed patient's chart.   Patient met with clinic  Belle Oh on 5-23-17.  Belle Frazier documented \" I met with Rere Pwar today. He is on [place] and receiving $200.00 a month cash, $180.00 SNAP and MCHP. He is living with a 40 year old friend and pays him $200.00 a month rent which leaves him no disposal income for medication and dr office co-pays.We discussed his living situation and he was very interested in having his own place. We completed an online application for [employer]. They are currently taking applications for efficiencies and one bedroom apartments in the [employer] buildings. There is a  component available at the [employer]. He is also wanting to be evaluated for a cane for walking. He said he has had surgeries in his spine. I told him I would let his doctor know. I printed out the application confirmation for him and will have a copy scanned into the record. \"    Will reach out to patient to confirm if he still needs help or interested in CCC.    " The patient is a 57y Female complaining of eye pain/injury.

## 2021-08-31 ENCOUNTER — IMMUNIZATION (OUTPATIENT)
Dept: FAMILY MEDICINE | Facility: CLINIC | Age: 66
End: 2021-08-31
Payer: COMMERCIAL

## 2021-08-31 PROCEDURE — 91301 PR COVID VAC MODERNA 100 MCG/0.5 ML IM: CPT

## 2021-08-31 PROCEDURE — 0011A PR COVID VAC MODERNA 100 MCG/0.5 ML IM: CPT

## 2021-08-31 NOTE — NURSING NOTE
Due to patient being non-English speaking/uses sign language, an  was used for this visit. Only for face-to-face interpretation by an external agency, date and length of interpretation can be found on the scanned worksheet.     name: Cem Watson  Agency: [employer]  Language: Ileana   Telephone number:   Type of interpretation: Face-to-face, spoken

## 2021-09-14 ENCOUNTER — LAB REQUISITION (OUTPATIENT)
Dept: LAB | Facility: CLINIC | Age: 66
End: 2021-09-14

## 2021-09-14 DIAGNOSIS — Z79.899 OTHER LONG TERM (CURRENT) DRUG THERAPY: ICD-10-CM

## 2021-09-14 LAB
ALBUMIN SERPL-MCNC: 3.9 G/DL (ref 3.5–5)
ALP SERPL-CCNC: 97 U/L (ref 45–120)
ALT SERPL W P-5'-P-CCNC: 37 U/L (ref 0–45)
ANION GAP SERPL CALCULATED.3IONS-SCNC: 9 MMOL/L (ref 5–18)
AST SERPL W P-5'-P-CCNC: 27 U/L (ref 0–40)
BASOPHILS # BLD AUTO: 0.1 10E3/UL (ref 0–0.2)
BASOPHILS NFR BLD AUTO: 1 %
BILIRUB SERPL-MCNC: 0.8 MG/DL (ref 0–1)
BUN SERPL-MCNC: 11 MG/DL (ref 8–22)
CALCIUM SERPL-MCNC: 9.7 MG/DL (ref 8.5–10.5)
CHLORIDE BLD-SCNC: 107 MMOL/L (ref 98–107)
CHOLEST SERPL-MCNC: 232 MG/DL
CO2 SERPL-SCNC: 24 MMOL/L (ref 22–31)
CREAT SERPL-MCNC: 1 MG/DL (ref 0.7–1.3)
EOSINOPHIL # BLD AUTO: 0.3 10E3/UL (ref 0–0.7)
EOSINOPHIL NFR BLD AUTO: 5 %
ERYTHROCYTE [DISTWIDTH] IN BLOOD BY AUTOMATED COUNT: 14 % (ref 10–15)
GFR SERPL CREATININE-BSD FRML MDRD: 79 ML/MIN/1.73M2
GLUCOSE BLD-MCNC: 100 MG/DL (ref 70–125)
HCT VFR BLD AUTO: 48.6 % (ref 40–53)
HDLC SERPL-MCNC: 31 MG/DL
HGB BLD-MCNC: 16.5 G/DL (ref 13.3–17.7)
IMM GRANULOCYTES # BLD: 0 10E3/UL
IMM GRANULOCYTES NFR BLD: 0 %
LDLC SERPL CALC-MCNC: 59 MG/DL
LDLC SERPL CALC-MCNC: ABNORMAL MG/DL
LYMPHOCYTES # BLD AUTO: 2.5 10E3/UL (ref 0.8–5.3)
LYMPHOCYTES NFR BLD AUTO: 36 %
MCH RBC QN AUTO: 30.4 PG (ref 26.5–33)
MCHC RBC AUTO-ENTMCNC: 34 G/DL (ref 31.5–36.5)
MCV RBC AUTO: 90 FL (ref 78–100)
MONOCYTES # BLD AUTO: 0.6 10E3/UL (ref 0–1.3)
MONOCYTES NFR BLD AUTO: 8 %
NEUTROPHILS # BLD AUTO: 3.5 10E3/UL (ref 1.6–8.3)
NEUTROPHILS NFR BLD AUTO: 50 %
NRBC # BLD AUTO: 0 10E3/UL
NRBC BLD AUTO-RTO: 0 /100
PLATELET # BLD AUTO: 316 10E3/UL (ref 150–450)
POTASSIUM BLD-SCNC: 4 MMOL/L (ref 3.5–5)
PROT SERPL-MCNC: 8 G/DL (ref 6–8)
RBC # BLD AUTO: 5.43 10E6/UL (ref 4.4–5.9)
SODIUM SERPL-SCNC: 140 MMOL/L (ref 136–145)
TRIGL SERPL-MCNC: 946 MG/DL
WBC # BLD AUTO: 7.1 10E3/UL (ref 4–11)

## 2021-09-14 PROCEDURE — 36415 COLL VENOUS BLD VENIPUNCTURE: CPT | Performed by: FAMILY MEDICINE

## 2021-09-14 PROCEDURE — 82465 ASSAY BLD/SERUM CHOLESTEROL: CPT | Performed by: FAMILY MEDICINE

## 2021-09-14 PROCEDURE — 85025 COMPLETE CBC W/AUTO DIFF WBC: CPT | Performed by: FAMILY MEDICINE

## 2021-09-14 PROCEDURE — 80053 COMPREHEN METABOLIC PANEL: CPT | Performed by: FAMILY MEDICINE

## 2021-09-14 PROCEDURE — 83721 ASSAY OF BLOOD LIPOPROTEIN: CPT | Performed by: FAMILY MEDICINE

## 2021-09-28 ENCOUNTER — IMMUNIZATION (OUTPATIENT)
Dept: FAMILY MEDICINE | Facility: CLINIC | Age: 66
End: 2021-09-28
Attending: FAMILY MEDICINE
Payer: COMMERCIAL

## 2021-09-28 PROCEDURE — 91301 PR COVID VAC MODERNA 100 MCG/0.5 ML IM: CPT

## 2021-09-28 PROCEDURE — 0012A PR COVID VAC MODERNA 100 MCG/0.5 ML IM: CPT

## 2021-11-24 ENCOUNTER — LAB REQUISITION (OUTPATIENT)
Dept: LAB | Facility: CLINIC | Age: 66
End: 2021-11-24

## 2021-11-24 DIAGNOSIS — E03.9 HYPOTHYROIDISM, UNSPECIFIED: ICD-10-CM

## 2021-11-24 LAB
CHOLEST SERPL-MCNC: 238 MG/DL
HDLC SERPL-MCNC: ABNORMAL MG/DL
LDLC SERPL CALC-MCNC: ABNORMAL MG/DL
LDLC SERPL CALC-MCNC: NORMAL MG/DL
TRIGL SERPL-MCNC: 1096 MG/DL
TSH SERPL DL<=0.005 MIU/L-ACNC: 3.75 UIU/ML (ref 0.3–5)

## 2021-11-24 PROCEDURE — 84478 ASSAY OF TRIGLYCERIDES: CPT | Performed by: FAMILY MEDICINE

## 2021-11-24 PROCEDURE — 83721 ASSAY OF BLOOD LIPOPROTEIN: CPT | Performed by: FAMILY MEDICINE

## 2021-11-24 PROCEDURE — 84443 ASSAY THYROID STIM HORMONE: CPT | Performed by: FAMILY MEDICINE

## 2021-12-28 ENCOUNTER — LAB REQUISITION (OUTPATIENT)
Dept: LAB | Facility: CLINIC | Age: 66
End: 2021-12-28

## 2021-12-28 ENCOUNTER — TRANSFERRED RECORDS (OUTPATIENT)
Dept: HEALTH INFORMATION MANAGEMENT | Facility: CLINIC | Age: 66
End: 2021-12-28
Payer: COMMERCIAL

## 2021-12-28 DIAGNOSIS — Z12.5 ENCOUNTER FOR SCREENING FOR MALIGNANT NEOPLASM OF PROSTATE: ICD-10-CM

## 2021-12-28 DIAGNOSIS — E78.2 MIXED HYPERLIPIDEMIA: ICD-10-CM

## 2021-12-28 LAB
CHOLEST SERPL-MCNC: 261 MG/DL
HDLC SERPL-MCNC: 31 MG/DL
LDLC SERPL CALC-MCNC: 65 MG/DL
LDLC SERPL CALC-MCNC: ABNORMAL MG/DL
PSA SERPL-MCNC: 4.77 UG/L (ref 0–4.5)
TRIGL SERPL-MCNC: 1017 MG/DL

## 2021-12-28 PROCEDURE — 80061 LIPID PANEL: CPT | Performed by: FAMILY MEDICINE

## 2021-12-28 PROCEDURE — 83721 ASSAY OF BLOOD LIPOPROTEIN: CPT | Performed by: FAMILY MEDICINE

## 2021-12-28 PROCEDURE — G0103 PSA SCREENING: HCPCS | Performed by: FAMILY MEDICINE

## 2022-01-11 ENCOUNTER — TELEPHONE (OUTPATIENT)
Dept: INFECTIOUS DISEASES | Facility: CLINIC | Age: 67
End: 2022-01-11

## 2022-01-11 NOTE — LETTER
January 21, 2022      [facility]  [address] [place] [address]        Dear [facility],     We have been trying to reach you via phone and have been unsuccessful. Please contact our clinic at [phone] to set up a follow up with our office.      Sincerely,        Shan Britt MD

## 2022-01-11 NOTE — TELEPHONE ENCOUNTER
Mihaela bonilla/[facility] called. She is going to fax an MRI over to Dr. Britt. Please have him review and advise on scheduling a f/u. Okay to reach out directly to the patient to arrange an appointment     Keron @ [phone], ok to leave message if there are questions.

## 2022-01-14 NOTE — TELEPHONE ENCOUNTER
1/14 - called x 2 - no answer no voicemail    Consent to communicate only had  listed, but no number to reach .

## 2022-02-01 ENCOUNTER — TELEPHONE (OUTPATIENT)
Dept: INFECTIOUS DISEASES | Facility: CLINIC | Age: 67
End: 2022-02-01

## 2022-02-01 NOTE — TELEPHONE ENCOUNTER
Dr Britt & Team    Pt had an appt on 2/2 with Dr Britt. Provider OOO.    M that appt cancelled and to reschedule @ [phone].

## 2022-02-01 NOTE — TELEPHONE ENCOUNTER
2/1 - called pt's preferred , Maurilio Umanzor x 1 - notified that appt has been cancelled. He will notify patient and call back to reschedule.        maurilio umanzor @ [phone]

## 2022-02-10 NOTE — TELEPHONE ENCOUNTER
Please review 1/11 encounter. Looks like PCP sent an MRI over. And then we've been trying to contact patient to schedule since then.

## 2022-02-15 NOTE — TELEPHONE ENCOUNTER
Mihaela bonilla/[facility] called. Unfortunately the only way to contact the patient is by calling the . She will make a note in the patients chart about the no response from the patient or  to schedule.

## 2022-03-16 ENCOUNTER — OFFICE VISIT (OUTPATIENT)
Dept: INFECTIOUS DISEASES | Facility: CLINIC | Age: 67
End: 2022-03-16
Payer: COMMERCIAL

## 2022-03-16 VITALS
WEIGHT: 141 LBS | BODY MASS INDEX: 26.42 KG/M2 | TEMPERATURE: 98.7 F | HEART RATE: 72 BPM | DIASTOLIC BLOOD PRESSURE: 66 MMHG | SYSTOLIC BLOOD PRESSURE: 120 MMHG

## 2022-03-16 DIAGNOSIS — R93.89 ABNORMAL MRI: ICD-10-CM

## 2022-03-16 DIAGNOSIS — B69.0 NEUROCYSTICERCOSIS: Primary | ICD-10-CM

## 2022-03-16 PROCEDURE — 99214 OFFICE O/P EST MOD 30 MIN: CPT | Performed by: INTERNAL MEDICINE

## 2022-03-16 NOTE — LETTER
3/16/2022       RE: [facility]  [address]  [place] [place] [facility] [address]      Referral to [facility],    Please review patient information below and call to schedule consult.    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  None in the spine, none in the eyes, none intraventricular.  No hydrocephalus.  No seizure history.  Since none were calcified and  little  edema was seen on MRI, these likely represent viable cysts.  Therefore treatment was warranted, to mimize future complications., such as seizures, hydrocephalus etc.  He  does report headaches, without vomiting or visual changes, though unclear this is necessarily related.  Other workup included negative HIV and negative QuantiFERON gold.  Negative hepatitis B surface antigen negative hepatitis C antibody. Negative syphilis.   Completed :   Albendazole 400 mg twice daily for 14 days,  Praziquantel 600 mg 3 times daily for 14 days,  With prednisone,  All in June 2018  Ordered repeat antiparasitic course May 2019  Language barrier  assisted, does have home nursing , lives alone  followup MRI x2 : improvement but no resolution   Yoselyn for T solium negative but unfortunately inaccurate  HIV neg 2018     Plan:   Latest MRI stable lesion count but with some new edema:  Refer to [facility]      Sincerely,    Shan Britt MD

## 2022-03-16 NOTE — PROGRESS NOTES
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  None in the spine, none in the eyes, none intraventricular.  No hydrocephalus.  No seizure history.  Since none were calcified and  little  edema was seen on MRI, these likely represent viable cysts.  Therefore treatment was warranted, to mimize future complications., such as seizures, hydrocephalus etc.  He  does report headaches, without vomiting or visual changes, though unclear this is necessarily related.  Other workup included negative HIV and negative QuantiFERON gold.  Negative hepatitis B surface antigen negative hepatitis C antibody. Negative syphilis.   Completed :   Albendazole 400 mg twice daily for 14 days,  Praziquantel 600 mg 3 times daily for 14 days,  With prednisone,  All in June 2018  Ordered repeat antiparasitic course May 2019  Language barrier  assisted, does have home nursing , lives alone  followup MRI x2 : improvement but no resolution   Yoselyn for T solium negative but unfortunately inaccurate  HIV neg 2018     Plan:   Latest MRI stable lesion count but with some new edema:  Refer to [facility]    Shan Britt M.D.            Present Illness:   This is a 64 years old male with neurocysticercosis, who is presenting for follow-up.  We saw him in in the past and we started him on treatment; at the  time a he had at least 10 lesions but without history of seizures.  No calcifications.  We gave him a double combined regimen of albendazole praziquantel and prednisone which he did take without any complications.  He missed a few appointments afterwards he did do an MRI in December which showed improvement as below.  followup MRI shows improvement but no resolution  Latest MRI stable, but increased edema left side    We then decided to reorder the antiparasitic treatment in May given MRI results  We repeated the MRI : did not show resolution  We discovered  last time that he did not  his prescription  He lives alone has no help except visiting nurse once a week, has a PCA  He needs       Latest MRI Dec 2021:  Stable but increased edema cluster lesions laeft lateral nodules  No new lesions    Occasional headaches,   no nausea no vomiting  No new visual blurring  No Fever chills.  He wears eyeglasses but otherwise no new visual changes      Social : patient lives alone has no family close by.   gets home nursing  Looks like [facility] primary care at the entire clinic           Review of Systems  Performed and all negative except as mentioned above.    Past medical, family, and social history reviewed, unchanged from before.        Physical Exam:     General Appearance:  Alert, cooperative, no distress, appears stated age    Head:  Normocephalic, without obvious abnormality, atraumatic   Neck no stiffness or adenopathy  Eyes no conjunctivitis or icterus   Oral cavity no thrush , ulcers or exudates    Lungs:  Clear to auscultation bilaterally                Abdomen:  Soft, non-tender, bowel sounds active , no masses, no organomegaly    Extremities:  Extremities normal, atraumatic, no cyanosis or edema,     Skin:  Skin color, texture, turgor normal, no rashes or lesions    Neurologic:  Alert and oriented X 3, strength 5/5, weaker right leg flexion  Gait normal but he does need a cane  No tremors  Finger-nose normal         DATA   No results found for any visits on 03/16/22.      Shan Britt MD, MD

## 2022-05-06 ENCOUNTER — PATIENT OUTREACH (OUTPATIENT)
Dept: CARE COORDINATION | Facility: CLINIC | Age: 67
End: 2022-05-06
Payer: COMMERCIAL

## 2022-05-06 NOTE — PROGRESS NOTES
Clinic Care Coordination Contact  [facility]/Voicemail    Referral Source: PCP  Clinical Data: Care Coordinator Outreach  Outreach attempted x 1.  Spoke to patient's granddaughter briefly. She told [facility] to call patient's  Cem Guerrero- [phone] as he takes patient to appointments.   Plan: Care Coordinator will try to reach patient again in 10 business days.    Shelli Foster, [facility]  Clinic Care Coordination  Pronouns: she/her/hers  [facility]  [facility]  [email or web address]  [phone]

## 2022-05-09 ENCOUNTER — PATIENT OUTREACH (OUTPATIENT)
Dept: CARE COORDINATION | Facility: CLINIC | Age: 67
End: 2022-05-09
Payer: COMMERCIAL

## 2022-05-25 ENCOUNTER — LAB REQUISITION (OUTPATIENT)
Dept: LAB | Facility: CLINIC | Age: 67
End: 2022-05-25

## 2022-05-25 DIAGNOSIS — Z00.00 ENCOUNTER FOR GENERAL ADULT MEDICAL EXAMINATION WITHOUT ABNORMAL FINDINGS: ICD-10-CM

## 2022-05-25 LAB
CHOLEST SERPL-MCNC: 223 MG/DL
HDLC SERPL-MCNC: 33 MG/DL
LDLC SERPL CALC-MCNC: 84 MG/DL
LDLC SERPL CALC-MCNC: ABNORMAL MG/DL
PSA SERPL-MCNC: 5.75 UG/L (ref 0–4.5)
TRIGL SERPL-MCNC: 651 MG/DL

## 2022-05-25 PROCEDURE — 83721 ASSAY OF BLOOD LIPOPROTEIN: CPT | Performed by: FAMILY MEDICINE

## 2022-05-25 PROCEDURE — 80061 LIPID PANEL: CPT | Performed by: FAMILY MEDICINE

## 2022-05-25 PROCEDURE — G0103 PSA SCREENING: HCPCS | Performed by: FAMILY MEDICINE

## 2022-06-01 ENCOUNTER — TELEPHONE (OUTPATIENT)
Dept: INFECTIOUS DISEASES | Facility: CLINIC | Age: 67
End: 2022-06-01
Payer: COMMERCIAL

## 2022-06-01 DIAGNOSIS — B69.0 NEUROCYSTICERCOSIS: Primary | ICD-10-CM

## 2022-06-01 NOTE — TELEPHONE ENCOUNTER
Referral to [facility] was rejection. Letter received and scanned into [facility].  Per Dr Britt sent referral to [facility]. Ok to refer to [facility] if unable to get into [facility].    Order faxed to [place]. Internal referral in [facility] for [facility]

## 2022-06-09 ENCOUNTER — PATIENT OUTREACH (OUTPATIENT)
Dept: CARE COORDINATION | Facility: CLINIC | Age: 67
End: 2022-06-09
Payer: COMMERCIAL

## 2022-06-09 NOTE — PROGRESS NOTES
Clinic Care Coordination Contact  [facility]/Voicemail       Clinical Data: Care Coordinator Outreach  Outreach attempted x 2.  Left message on patient's   Cem's voicemail with call back information and requested return call.  Plan:Care Coordinator will try to reach patient again in 1 month.    JUSTINE Berrios  Social Work Care Coordinator - [facility]  Care Coordination  [email or web address]  Cell Phone: [phone]  Gender pronouns: she/her  Employed by [employer]

## 2022-08-03 NOTE — TELEPHONE ENCOUNTER
RECORDS RECEIVED FROM: Internal   DATE RECEIVED: 08.25.2022   NOTES (Gather within 2 years) STATUS DETAILS   OFFICE NOTE from referring provider   Internal 06.01.2022 Shan Britt MD   OFFICE NOTE from other specialist     DISCHARGE SUMMARY from hospital     DISCHARGE REPORT from the ER     LABS (any labs) Internal    MEDICATION LIST Internal    IMAGING  (NEED IMAGES AND REPORTS)     Osteomyelitis: Foot imaging      Liver Abscess: Abdominal imaging     Other (anything related to diagnoses

## 2022-08-09 ENCOUNTER — PATIENT OUTREACH (OUTPATIENT)
Dept: CARE COORDINATION | Facility: CLINIC | Age: 67
End: 2022-08-09

## 2022-08-09 NOTE — PROGRESS NOTES
Clinic Care Coordination Contact    Clinic Care Coordination Contact  OUTREACH    Referral Information:  Referral Source: PCP         Chief Complaint   Patient presents with     Clinic Care Coordination - Initial        Universal Utilization:      Utilization    Hospital Admissions  0             ED Visits  0             No Show Count (past year)  1                Current as of: 8/3/2022  5:55 PM              Clinical Concerns:  Current Medical Concerns:   Neurocysticercosis  Current Behavioral Concerns:     Education Provided to patient:       Health Maintenance Reviewed:    Clinical Pathway: None    Medication Management:  Medication review status:  Did not address today    Functional Status:       Living Situation:  Current living arrangement:: I live in a private home with spouse  Type of residence:: Apartment    Lifestyle & Psychosocial Needs: [facility] outreached to pt via [employer] language services. CC spoke to both Pah and his wife. Discussed transportation concerns. Pt. States he uses his  [employer] [phone] when he needs to go to the . Other than that, he does not have transportation to appts. [facility] discussed utilizing medical taxi through [employer], pt was not aware of this service. [facility] will call [facility] to get specifics ) [phone] and will call pt back . Pt requested if he had an appt coming up. [facility] confirmed he has an appt on 8/25 in [facility] at infectious disease. Pt and wife expressed concern on how to get to [place] for this appt. [facility] will follow up with  as well as [employer] to see if he can utilize that resource.     Social Determinants of Health     Tobacco Use: High Risk     Smoking Tobacco Use: Current Every Day Smoker     Smokeless Tobacco Use: Current User   Alcohol Use: Not on file   Financial Resource Strain: Not on file   Food Insecurity: Not on file   Transportation Needs: Not on file   Physical Activity: Not on file   Stress: Not on file   Social  Connections: Not on file   Intimate Partner Violence: Not on file   Depression: Not on file   Housing Stability: Not on file                            Resources and Interventions:  Current Resources:                              Referrals Placed: Transportation         Goals:       Patient/Caregiver understanding:        Future Appointments              In 2 weeks Lukas Dumont MD [facility] Infectious Disease Clinic [facility]          Plan: [facility] to contact [facility] for medical taxi, [facility] to follow up with  , [facility] to outreach to family within 5-7 days.

## 2022-08-12 ENCOUNTER — PATIENT OUTREACH (OUTPATIENT)
Dept: CARE COORDINATION | Facility: CLINIC | Age: 67
End: 2022-08-12

## 2022-08-12 NOTE — PROGRESS NOTES
Clinic Care Coordination Contact    Follow Up Progress Note      Assessment: [facility] phoned pts family to share information for scheduling medical taxi rides with [employer]/ [employer]. Pt's family requested help in scheduling the appt for 8/25. [facility] called [employer] and set up ride for 8/25 at 7 a.m. for 8 a.m. appt at [facility] Infectious disease clinic at [address] [place] [address] in \[facility] [place] [facility]\"". [facility] called pt/ family with language line and informed them of the ride set up. [facility] also shared this writers contact information in the event they have a question, they can call and leave a message with pt's name and I will call them back. Pt. Expressed understanding.    Care Gaps:    Health Maintenance Due   Topic Date Due     NICOTINE/TOBACCO CESSATION COUNSELING Q 1 YR  Never done     ADVANCE CARE PLANNING  Never done     ZOSTER IMMUNIZATION (1 of 2) Never done     LUNG CANCER SCREENING  Never done     MEDICARE ANNUAL WELLNESS VISIT  Never done     FALL RISK ASSESSMENT  Never done     PHQ-2 (once per calendar year)  01/01/2022     AORTIC ANEURYSM SCREENING (SYSTEM ASSIGNED)  01/01/2022           Goals addressed this encounter:    Goals Addressed                    This Visit's Progress       Patient Stated       1Transportation (pt-stated)         Goal Statement: I will utilize medical taxi's for dr. Asif  to ensure that I arrive on time by 12/22  Date Goal set: 8/12/22  Barriers: language  Strengths: supportive care team  Date to Achieve By: 12/22  Patient expressed understanding of goal: Yes  Action steps to achieve this goal:  1. I will contact [employer] transportation and request an   2. I will request a ride to my appt 3 weeks in advance  3. I will I will ensure I have the details of my appointment available to make the appt.               Intervention/Education provided during outreach:               Plan:   Pt to attend dr. tavarez on 8/25 @ 8 a.m.   Care Coordinator will follow up in 7-10 days.   Stroke Discharge Instructions (English) View Edit Remove  Heart Disease in Diabetics Discharge Instructions (English) View Edit Remove  Heart Healthy Diet (English) View Edit Remove  Apixaban, ADULT (English) View Edit Remove  Clopidogrel, ADULT (English) View Edit Remove  Pantoprazole, ADULT (English) View Edit Remove  Going Home on Blood Thinners (English) View Edit Remove

## 2022-08-12 NOTE — LETTER
Kimmie  August 12, 2022    [facility]  [address] [facility] [address]  [place] [address]      Dear [facility],        I am a clinic care coordinator who works with Coty Calix MD with the [facility]. I wanted to thank you for spending the time to talk with me.  Below is a description of clinic care coordination and how I can further assist you.       The clinic care coordination team is made up of a registered nurse, , financial resource worker and community health worker who understand the health care system. The goal of clinic care coordination is to help you manage your health and improve access to the health care system. Our team works alongside your provider to assist you in determining your health and social needs. We can help you obtain health care and community resources, providing you with necessary information and education. We can work with you through any barriers and develop a care plan that helps coordinate and strengthen the communication between you and your care team.    Please feel free to contact me with any questions or concerns regarding care coordination and what we can offer.      We are focused on providing you with the highest-quality healthcare experience possible.    Sincerely,     JUSTINE Rea  , Care Coordination  [facility]  [phone]  [email or web address]

## 2022-08-12 NOTE — LETTER
Patient Centered Plan of Care  About Me:        Patient Name:  Rere Robbins    YOB: 1955  Age:         67 year old   Brittany MRN:    [id number] Telephone Information:  Home Phone [phone]   Mobile [phone]   Mobile [phone]       Address:  [address] [place] [address] Email address:  [email or web address]      Emergency Contact(s)    Name Relationship Lgl Grd Work Phone Home Phone Mobile Phone   1. PO,TAW Cousin   [phone]           Primary language:  Simona     needed? Yes   [facility]:  [phone] op. 1  Other communication barriers:No data recorded  Preferred Method of Communication:     Current living arrangement: I live in a private home with spouse    Mobility Status/ Medical Equipment: No data recorded      Health Maintenance  Health Maintenance Reviewed: No data recorded    My Access Plan  Medical Emergency 911   Primary Clinic Line [facility]   24 Hour Appointment Line [phone] or  [phone] ([phone]) (toll-free)   24 Hour Nurse Line [phone] (toll-free)   Preferred Urgent Care No data recorded   Preferred Hospital No data recorded   Preferred Pharmacy [facility] - [place], [place] - [address] Behavioral Health Crisis Line The National Suicide Prevention Lifeline at 1-803.453.9817 or 989             My Care Team Members  Patient Care Team       Relationship Specialty Notifications Start End    Coty Calix MD PCP - General Student in organized health care education/training program  9/7/21     Merged    Phone: [phone] Fax: [phone]         [address] [place] [address]    Belle Guy MD  Family Practice  7/1/18     Merged    Phone: [phone] Fax: [phone]         [facility] [address] [place] [address]    Shan Britt MD Assigned Infectious Disease Provider   3/20/22     Phone: [phone] Fax: [phone]         [address] [address]  [facility] [address]    Gretel Connolly LSW Clinic Care Coordinator   5/31/22     Ladi Medina [facility] Clinic Care Coordinator   8/9/22             My Care Plans  Self Management and Treatment Plan  Goals and (Comments)   Goals        General     1Transportation (pt-stated)      Notes - Note edited  8/12/2022 10:38 AM by Ladi Medina LSW     Goal Statement: I will utilize medical taxi's for dr. Asif  to ensure that I arrive on time by 12/22  Date Goal set: 8/12/22  Barriers: language  Strengths: supportive care team  Date to Achieve By: 12/22  Patient expressed understanding of goal: Yes  Action steps to achieve this goal:  1. I will contact [employer] transportation and request an   2. I will request a ride to my appt 3 weeks in advance  3. I will I will ensure I have the details of my appointment available to make the appt.                Action Plans on File:                       Advance Care Plans/Directives Type:   No data recorded    My Medical and Care Information  Problem List   Patient Active Problem List   Diagnosis     Myofascial pain     GERD (gastroesophageal reflux disease)     Lumbar radiculopathy     Chronic abdominal pain     Neurocysticercosis      Current Medications and Allergies:  See printed Medication Report.    Care Coordination Start Date: No linked episodes   Frequency of Care Coordination: monthly     Form Last Updated: 08/12/2022

## 2022-08-25 ENCOUNTER — OFFICE VISIT (OUTPATIENT)
Dept: INFECTIOUS DISEASES | Facility: CLINIC | Age: 67
End: 2022-08-25
Attending: STUDENT IN AN ORGANIZED HEALTH CARE EDUCATION/TRAINING PROGRAM
Payer: COMMERCIAL

## 2022-08-25 ENCOUNTER — PRE VISIT (OUTPATIENT)
Dept: INFECTIOUS DISEASES | Facility: CLINIC | Age: 67
End: 2022-08-25

## 2022-08-25 VITALS
HEART RATE: 84 BPM | SYSTOLIC BLOOD PRESSURE: 131 MMHG | OXYGEN SATURATION: 98 % | DIASTOLIC BLOOD PRESSURE: 82 MMHG | TEMPERATURE: 98.2 F | WEIGHT: 142 LBS | BODY MASS INDEX: 26.61 KG/M2

## 2022-08-25 DIAGNOSIS — B69.0 NEUROCYSTICERCOSIS: Primary | ICD-10-CM

## 2022-08-25 DIAGNOSIS — R93.89 ABNORMAL MRI: ICD-10-CM

## 2022-08-25 PROCEDURE — 99215 OFFICE O/P EST HI 40 MIN: CPT | Performed by: STUDENT IN AN ORGANIZED HEALTH CARE EDUCATION/TRAINING PROGRAM

## 2022-08-25 PROCEDURE — G0463 HOSPITAL OUTPT CLINIC VISIT: HCPCS

## 2022-08-25 ASSESSMENT — PAIN SCALES - GENERAL: PAINLEVEL: NO PAIN (0)
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
## 2022-08-25 NOTE — NURSING NOTE
Chief Complaint   Patient presents with     Consult     New pt. Consult.     Blood pressure 131/82, pulse 84, temperature 98.2  F (36.8  C), weight 64.4 kg (142 lb), SpO2 98 %.    JEWELS CAMPOS
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
## 2022-08-26 ENCOUNTER — TELEPHONE (OUTPATIENT)
Dept: INFECTIOUS DISEASES | Facility: CLINIC | Age: 67
End: 2022-08-26

## 2022-08-31 ENCOUNTER — TELEPHONE (OUTPATIENT)
Dept: OPHTHALMOLOGY | Facility: CLINIC | Age: 67
End: 2022-08-31

## 2022-08-31 NOTE — TELEPHONE ENCOUNTER
Spoke to pt/family with  at 1015    Pt with vision changes times 2 months.    Scheduled this Thursday with Dr. Patton on the 9th floor PWB location    Pt aware of date/time/location/duration/main clinic number    Neo Manrique RN 10:23 AM 09/06/22    ---        [phone] cell    Left message with direct number at [phone]    Neo Manrique RN 4:26 PM 09/02/22    ---        Cell #4 [phone] busy times 3  Cell #2 [phone] not in service times 2  Home [phone]  Called and left message with direct number at [phone]    Neo Manrique RN 4:18 PM 08/31/22           [facility] Call Center    Phone Message    May a detailed message be left on voicemail: yes     Reason for Call: Appointment Intake    Referring Provider Name: ZAIRA DON  Diagnosis and/or Symptoms: Neurocysticercosis,neurocysticercosis, reports worsening vision, needs exam to rule out ocular involvement     Unable to schedule dx not on protocols.     Action Taken: Message routed to:  [facility] ophthalmology    Travel Screening: Not Applicable

## 2022-09-08 DIAGNOSIS — B69.0 NEUROCYSTICERCOSIS: Primary | ICD-10-CM

## 2022-12-21 ENCOUNTER — LAB REQUISITION (OUTPATIENT)
Dept: LAB | Facility: CLINIC | Age: 67
End: 2022-12-21

## 2022-12-21 DIAGNOSIS — F33.42 MAJOR DEPRESSIVE DISORDER, RECURRENT, IN FULL REMISSION (H): ICD-10-CM

## 2022-12-21 LAB
ALBUMIN SERPL BCG-MCNC: 5.3 G/DL (ref 3.5–5.2)
ALP SERPL-CCNC: 119 U/L (ref 40–129)
ALT SERPL W P-5'-P-CCNC: 55 U/L (ref 10–50)
ANION GAP SERPL CALCULATED.3IONS-SCNC: 8 MMOL/L (ref 7–15)
AST SERPL W P-5'-P-CCNC: 42 U/L (ref 10–50)
BILIRUB SERPL-MCNC: 0.7 MG/DL
BUN SERPL-MCNC: 12.4 MG/DL (ref 8–23)
CALCIUM SERPL-MCNC: 10.4 MG/DL (ref 8.8–10.2)
CHLORIDE SERPL-SCNC: 100 MMOL/L (ref 98–107)
CHOLEST SERPL-MCNC: 248 MG/DL
CREAT SERPL-MCNC: 0.99 MG/DL (ref 0.67–1.17)
DEPRECATED HCO3 PLAS-SCNC: 33 MMOL/L (ref 22–29)
ERYTHROCYTE [DISTWIDTH] IN BLOOD BY AUTOMATED COUNT: 13.7 % (ref 10–15)
GFR SERPL CREATININE-BSD FRML MDRD: 85 ML/MIN/1.73M2
GLUCOSE SERPL-MCNC: 112 MG/DL (ref 70–99)
HBA1C MFR BLD: 4.9 %
HCT VFR BLD AUTO: 52.7 % (ref 40–53)
HDLC SERPL-MCNC: 33 MG/DL
HGB BLD-MCNC: 17.6 G/DL (ref 13.3–17.7)
LDLC SERPL CALC-MCNC: ABNORMAL MG/DL
LDLC SERPL DIRECT ASSAY-MCNC: 84 MG/DL
MCH RBC QN AUTO: 30.5 PG (ref 26.5–33)
MCHC RBC AUTO-ENTMCNC: 33.4 G/DL (ref 31.5–36.5)
MCV RBC AUTO: 91 FL (ref 78–100)
NONHDLC SERPL-MCNC: 215 MG/DL
PLATELET # BLD AUTO: 301 10E3/UL (ref 150–450)
POTASSIUM SERPL-SCNC: 4 MMOL/L (ref 3.4–5.3)
PROT SERPL-MCNC: 8 G/DL (ref 6.4–8.3)
RBC # BLD AUTO: 5.77 10E6/UL (ref 4.4–5.9)
SODIUM SERPL-SCNC: 141 MMOL/L (ref 136–145)
T4 FREE SERPL-MCNC: 1.28 NG/DL (ref 0.9–1.7)
TRIGL SERPL-MCNC: 707 MG/DL
TSH SERPL DL<=0.005 MIU/L-ACNC: 2.74 UIU/ML (ref 0.3–4.2)
WBC # BLD AUTO: 6.9 10E3/UL (ref 4–11)

## 2022-12-21 PROCEDURE — 83036 HEMOGLOBIN GLYCOSYLATED A1C: CPT | Performed by: FAMILY MEDICINE

## 2022-12-21 PROCEDURE — 80053 COMPREHEN METABOLIC PANEL: CPT | Performed by: FAMILY MEDICINE

## 2022-12-21 PROCEDURE — 83721 ASSAY OF BLOOD LIPOPROTEIN: CPT | Performed by: FAMILY MEDICINE

## 2022-12-21 PROCEDURE — 84439 ASSAY OF FREE THYROXINE: CPT | Performed by: FAMILY MEDICINE

## 2022-12-21 PROCEDURE — 84443 ASSAY THYROID STIM HORMONE: CPT | Performed by: FAMILY MEDICINE

## 2022-12-21 PROCEDURE — 85014 HEMATOCRIT: CPT | Performed by: FAMILY MEDICINE

## 2022-12-21 PROCEDURE — 80061 LIPID PANEL: CPT | Performed by: FAMILY MEDICINE

## 2022-12-22 ENCOUNTER — TELEPHONE (OUTPATIENT)
Dept: INFECTIOUS DISEASES | Facility: CLINIC | Age: 67
End: 2022-12-22

## 2022-12-22 NOTE — TELEPHONE ENCOUNTER
Orders faxed over to [facility]. Notified Dr. Odonnell's office who will let patient know.

## 2022-12-22 NOTE — TELEPHONE ENCOUNTER
[facility] Call Center    Phone Message    May a detailed message be left on voicemail: no     Reason for Call: Other: Dr Rangel office with [facility] called wondering if patient can get his MRI done through the [facility]. Please advise. Thank you     Action Taken: Message routed to:  Other: ID    Travel Screening: Not Applicable

## 2022-12-29 ENCOUNTER — TRANSFERRED RECORDS (OUTPATIENT)
Dept: HEALTH INFORMATION MANAGEMENT | Facility: CLINIC | Age: 67
End: 2022-12-29

## 2023-03-14 ENCOUNTER — TRANSFERRED RECORDS (OUTPATIENT)
Dept: HEALTH INFORMATION MANAGEMENT | Facility: CLINIC | Age: 68
End: 2023-03-14

## 2023-05-17 NOTE — PROGRESS NOTES
Clinic Care Coordination Contact  [facility]/Voicemail    Referral Source: PCP  Clinical Data: Care Coordinator Outreach  Outreach attempted x 1.  Left message on Cem Guerrero's voicemail with call back information and requested return call.  Plan: Care Coordinator will try to reach patient again in 3-5 business days.    Shelli Foster, [facility]  Clinic Care Coordination  Pronouns: she/her/hers  [facility]  [facility]  [email or web address]  [phone]    
yes

## 2023-08-02 ENCOUNTER — LAB REQUISITION (OUTPATIENT)
Dept: LAB | Facility: CLINIC | Age: 68
End: 2023-08-02

## 2023-08-02 DIAGNOSIS — R53.83 OTHER FATIGUE: ICD-10-CM

## 2023-08-02 LAB
ALBUMIN SERPL BCG-MCNC: 4.7 G/DL (ref 3.5–5.2)
ALP SERPL-CCNC: 100 U/L (ref 40–129)
ALT SERPL W P-5'-P-CCNC: 41 U/L (ref 0–70)
ANION GAP SERPL CALCULATED.3IONS-SCNC: 12 MMOL/L (ref 7–15)
AST SERPL W P-5'-P-CCNC: 37 U/L (ref 0–45)
BILIRUB SERPL-MCNC: 0.5 MG/DL
BUN SERPL-MCNC: 12.7 MG/DL (ref 8–23)
CALCIUM SERPL-MCNC: 9.7 MG/DL (ref 8.8–10.2)
CHLORIDE SERPL-SCNC: 103 MMOL/L (ref 98–107)
CREAT SERPL-MCNC: 1.07 MG/DL (ref 0.67–1.17)
DEPRECATED HCO3 PLAS-SCNC: 24 MMOL/L (ref 22–29)
GFR SERPL CREATININE-BSD FRML MDRD: 77 ML/MIN/1.73M2
GLUCOSE SERPL-MCNC: 119 MG/DL (ref 70–99)
POTASSIUM SERPL-SCNC: 4 MMOL/L (ref 3.4–5.3)
PROT SERPL-MCNC: 7.8 G/DL (ref 6.4–8.3)
SODIUM SERPL-SCNC: 139 MMOL/L (ref 136–145)
TSH SERPL DL<=0.005 MIU/L-ACNC: 1.4 UIU/ML (ref 0.3–4.2)

## 2023-08-02 PROCEDURE — 84443 ASSAY THYROID STIM HORMONE: CPT | Performed by: FAMILY MEDICINE

## 2023-08-02 PROCEDURE — 80053 COMPREHEN METABOLIC PANEL: CPT | Performed by: FAMILY MEDICINE

## 2023-10-12 ENCOUNTER — TRANSFERRED RECORDS (OUTPATIENT)
Dept: HEALTH INFORMATION MANAGEMENT | Facility: CLINIC | Age: 68
End: 2023-10-12

## 2023-11-25 ENCOUNTER — TRANSFERRED RECORDS (OUTPATIENT)
Dept: HEALTH INFORMATION MANAGEMENT | Facility: CLINIC | Age: 68
End: 2023-11-25
Payer: COMMERCIAL

## 2023-11-27 ENCOUNTER — APPOINTMENT (OUTPATIENT)
Dept: CT IMAGING | Facility: HOSPITAL | Age: 68
DRG: 717 | End: 2023-11-27
Payer: COMMERCIAL

## 2023-11-27 ENCOUNTER — HOSPITAL ENCOUNTER (INPATIENT)
Facility: HOSPITAL | Age: 68
LOS: 8 days | Discharge: HOME HEALTH | DRG: 717 | End: 2023-12-05
Attending: EMERGENCY MEDICINE | Admitting: INTERNAL MEDICINE
Payer: COMMERCIAL

## 2023-11-27 DIAGNOSIS — R31.0 FRANK HEMATURIA: ICD-10-CM

## 2023-11-27 DIAGNOSIS — I10 BENIGN ESSENTIAL HYPERTENSION: Primary | ICD-10-CM

## 2023-11-27 DIAGNOSIS — R33.9 URINARY RETENTION: ICD-10-CM

## 2023-11-27 LAB
ANION GAP SERPL CALCULATED.3IONS-SCNC: 10 MMOL/L (ref 7–15)
BUN SERPL-MCNC: 12.5 MG/DL (ref 8–23)
CALCIUM SERPL-MCNC: 10 MG/DL (ref 8.8–10.2)
CHLORIDE SERPL-SCNC: 100 MMOL/L (ref 98–107)
CREAT SERPL-MCNC: 0.95 MG/DL (ref 0.67–1.17)
DEPRECATED HCO3 PLAS-SCNC: 26 MMOL/L (ref 22–29)
EGFRCR SERPLBLD CKD-EPI 2021: 88 ML/MIN/1.73M2
ERYTHROCYTE [DISTWIDTH] IN BLOOD BY AUTOMATED COUNT: 13.4 % (ref 10–15)
GLUCOSE SERPL-MCNC: 134 MG/DL (ref 70–99)
HCT VFR BLD AUTO: 48.8 % (ref 40–53)
HGB BLD-MCNC: 17.1 G/DL (ref 13.3–17.7)
MCH RBC QN AUTO: 30.8 PG (ref 26.5–33)
MCHC RBC AUTO-ENTMCNC: 35 G/DL (ref 31.5–36.5)
MCV RBC AUTO: 88 FL (ref 78–100)
PLATELET # BLD AUTO: 292 10E3/UL (ref 150–450)
POTASSIUM SERPL-SCNC: 3.8 MMOL/L (ref 3.4–5.3)
RBC # BLD AUTO: 5.55 10E6/UL (ref 4.4–5.9)
SODIUM SERPL-SCNC: 136 MMOL/L (ref 135–145)
WBC # BLD AUTO: 11.8 10E3/UL (ref 4–11)

## 2023-11-27 PROCEDURE — 99222 1ST HOSP IP/OBS MODERATE 55: CPT | Performed by: INTERNAL MEDICINE

## 2023-11-27 PROCEDURE — 74177 CT ABD & PELVIS W/CONTRAST: CPT

## 2023-11-27 PROCEDURE — 51702 INSERT TEMP BLADDER CATH: CPT

## 2023-11-27 PROCEDURE — 36415 COLL VENOUS BLD VENIPUNCTURE: CPT

## 2023-11-27 PROCEDURE — 51798 US URINE CAPACITY MEASURE: CPT

## 2023-11-27 PROCEDURE — 85027 COMPLETE CBC AUTOMATED: CPT

## 2023-11-27 PROCEDURE — 250N000009 HC RX 250

## 2023-11-27 PROCEDURE — 80053 COMPREHEN METABOLIC PANEL: CPT

## 2023-11-27 PROCEDURE — 82668 ASSAY OF ERYTHROPOIETIN: CPT | Performed by: INTERNAL MEDICINE

## 2023-11-27 PROCEDURE — 84443 ASSAY THYROID STIM HORMONE: CPT | Performed by: INTERNAL MEDICINE

## 2023-11-27 PROCEDURE — 99285 EMERGENCY DEPT VISIT HI MDM: CPT | Mod: 25

## 2023-11-27 PROCEDURE — 96374 THER/PROPH/DIAG INJ IV PUSH: CPT | Mod: 59

## 2023-11-27 PROCEDURE — 120N000001 HC R&B MED SURG/OB

## 2023-11-27 PROCEDURE — 82040 ASSAY OF SERUM ALBUMIN: CPT | Performed by: INTERNAL MEDICINE

## 2023-11-27 PROCEDURE — 250N000011 HC RX IP 250 OP 636: Mod: JZ | Performed by: STUDENT IN AN ORGANIZED HEALTH CARE EDUCATION/TRAINING PROGRAM

## 2023-11-27 PROCEDURE — 80048 BASIC METABOLIC PNL TOTAL CA: CPT

## 2023-11-27 PROCEDURE — 250N000011 HC RX IP 250 OP 636

## 2023-11-27 RX ORDER — AMOXICILLIN 250 MG
2 CAPSULE ORAL 2 TIMES DAILY PRN
Status: DISCONTINUED | OUTPATIENT
Start: 2023-11-27 | End: 2023-12-05 | Stop reason: HOSPADM

## 2023-11-27 RX ORDER — LIDOCAINE 40 MG/G
CREAM TOPICAL
Status: DISCONTINUED | OUTPATIENT
Start: 2023-11-27 | End: 2023-12-05 | Stop reason: HOSPADM

## 2023-11-27 RX ORDER — AMOXICILLIN 250 MG
1 CAPSULE ORAL 2 TIMES DAILY PRN
Status: DISCONTINUED | OUTPATIENT
Start: 2023-11-27 | End: 2023-12-05 | Stop reason: HOSPADM

## 2023-11-27 RX ORDER — ACETAMINOPHEN 650 MG/1
650 SUPPOSITORY RECTAL EVERY 4 HOURS PRN
Status: DISCONTINUED | OUTPATIENT
Start: 2023-11-27 | End: 2023-12-05 | Stop reason: HOSPADM

## 2023-11-27 RX ORDER — IOPAMIDOL 755 MG/ML
75 INJECTION, SOLUTION INTRAVASCULAR ONCE
Status: COMPLETED | OUTPATIENT
Start: 2023-11-27 | End: 2023-11-27

## 2023-11-27 RX ORDER — ACETAMINOPHEN 325 MG/1
650 TABLET ORAL EVERY 4 HOURS PRN
Status: DISCONTINUED | OUTPATIENT
Start: 2023-11-27 | End: 2023-12-05 | Stop reason: HOSPADM

## 2023-11-27 RX ORDER — LIDOCAINE HYDROCHLORIDE 20 MG/ML
10 JELLY TOPICAL ONCE
Status: COMPLETED | OUTPATIENT
Start: 2023-11-27 | End: 2023-11-27

## 2023-11-27 RX ORDER — MORPHINE SULFATE 4 MG/ML
4 INJECTION, SOLUTION INTRAMUSCULAR; INTRAVENOUS ONCE
Status: COMPLETED | OUTPATIENT
Start: 2023-11-27 | End: 2023-11-27

## 2023-11-27 RX ADMIN — LIDOCAINE HYDROCHLORIDE 10 ML: 20 JELLY TOPICAL at 18:56

## 2023-11-27 RX ADMIN — MORPHINE SULFATE 4 MG: 4 INJECTION, SOLUTION INTRAMUSCULAR; INTRAVENOUS at 22:48

## 2023-11-27 RX ADMIN — IOPAMIDOL 75 ML: 755 INJECTION, SOLUTION INTRAVENOUS at 20:13

## 2023-11-27 ASSESSMENT — ENCOUNTER SYMPTOMS
NAUSEA: 0
VOMITING: 0
DIFFICULTY URINATING: 1
HEMATURIA: 0
CHILLS: 0
FEVER: 0
DIARRHEA: 0
CONSTIPATION: 1
DYSURIA: 1

## 2023-11-27 ASSESSMENT — ACTIVITIES OF DAILY LIVING (ADL)
ADLS_ACUITY_SCORE: 35

## 2023-11-27 NOTE — ED TRIAGE NOTES
Pt is here with his PCA, has not urinated since this am, belly is hard and distended.      Triage Assessment (Adult)       Row Name 11/27/23 5640          Triage Assessment    Airway WDL WDL        Respiratory WDL    Respiratory WDL WDL        Skin Circulation/Temperature WDL    Skin Circulation/Temperature WDL WDL        Cardiac WDL    Cardiac WDL WDL        Peripheral/Neurovascular WDL    Peripheral Neurovascular WDL WDL        Cognitive/Neuro/Behavioral WDL    Cognitive/Neuro/Behavioral WDL WDL

## 2023-11-28 ENCOUNTER — ANESTHESIA EVENT (OUTPATIENT)
Dept: SURGERY | Facility: HOSPITAL | Age: 68
DRG: 717 | End: 2023-11-28
Payer: COMMERCIAL

## 2023-11-28 ENCOUNTER — ANESTHESIA (OUTPATIENT)
Dept: SURGERY | Facility: HOSPITAL | Age: 68
DRG: 717 | End: 2023-11-28
Payer: COMMERCIAL

## 2023-11-28 PROBLEM — A41.9 SEVERE SEPSIS (H): Status: ACTIVE | Noted: 2023-11-28

## 2023-11-28 PROBLEM — N17.9 AKI (ACUTE KIDNEY INJURY) (H): Status: ACTIVE | Noted: 2023-11-28

## 2023-11-28 PROBLEM — E87.20 LACTIC ACIDOSIS: Status: ACTIVE | Noted: 2023-11-28

## 2023-11-28 PROBLEM — R65.20 SEVERE SEPSIS (H): Status: ACTIVE | Noted: 2023-11-28

## 2023-11-28 LAB
ABO/RH(D): NORMAL
ALBUMIN SERPL BCG-MCNC: 4.6 G/DL (ref 3.5–5.2)
ALBUMIN UR-MCNC: 30 MG/DL
ALP SERPL-CCNC: 100 U/L (ref 40–150)
ALT SERPL W P-5'-P-CCNC: 48 U/L (ref 0–70)
ANION GAP SERPL CALCULATED.3IONS-SCNC: 12 MMOL/L (ref 7–15)
ANION GAP SERPL CALCULATED.3IONS-SCNC: 12 MMOL/L (ref 7–15)
ANTIBODY SCREEN: NEGATIVE
APPEARANCE UR: ABNORMAL
APTT PPP: 24 SECONDS (ref 22–38)
APTT PPP: 26 SECONDS (ref 22–38)
AST SERPL W P-5'-P-CCNC: 41 U/L (ref 0–45)
BACTERIA #/AREA URNS HPF: ABNORMAL /HPF
BILIRUB DIRECT SERPL-MCNC: <0.2 MG/DL (ref 0–0.3)
BILIRUB SERPL-MCNC: 0.7 MG/DL
BILIRUB UR QL STRIP: NEGATIVE
BUN SERPL-MCNC: 14.1 MG/DL (ref 8–23)
BUN SERPL-MCNC: 16.8 MG/DL (ref 8–23)
CALCIUM SERPL-MCNC: 8 MG/DL (ref 8.8–10.2)
CALCIUM SERPL-MCNC: 9.4 MG/DL (ref 8.8–10.2)
CHLORIDE SERPL-SCNC: 101 MMOL/L (ref 98–107)
CHLORIDE SERPL-SCNC: 109 MMOL/L (ref 98–107)
COLOR UR AUTO: ABNORMAL
CREAT SERPL-MCNC: 1.27 MG/DL (ref 0.67–1.17)
CREAT SERPL-MCNC: 1.42 MG/DL (ref 0.67–1.17)
DEPRECATED HCO3 PLAS-SCNC: 20 MMOL/L (ref 22–29)
DEPRECATED HCO3 PLAS-SCNC: 23 MMOL/L (ref 22–29)
EGFRCR SERPLBLD CKD-EPI 2021: 54 ML/MIN/1.73M2
EGFRCR SERPLBLD CKD-EPI 2021: 62 ML/MIN/1.73M2
ERYTHROCYTE [DISTWIDTH] IN BLOOD BY AUTOMATED COUNT: 13.6 % (ref 10–15)
GLUCOSE SERPL-MCNC: 152 MG/DL (ref 70–99)
GLUCOSE SERPL-MCNC: 175 MG/DL (ref 70–99)
GLUCOSE UR STRIP-MCNC: NEGATIVE MG/DL
HCT VFR BLD AUTO: 42.1 % (ref 40–53)
HGB BLD-MCNC: 14.2 G/DL (ref 13.3–17.7)
HGB UR QL STRIP: ABNORMAL
HOLD SPECIMEN: NORMAL
HOLD SPECIMEN: NORMAL
INR PPP: 1.01 (ref 0.85–1.15)
INR PPP: 1.05 (ref 0.85–1.15)
KETONES UR STRIP-MCNC: 5 MG/DL
LACTATE SERPL-SCNC: 3 MMOL/L (ref 0.7–2)
LACTATE SERPL-SCNC: 3.4 MMOL/L (ref 0.7–2)
LACTATE SERPL-SCNC: 3.7 MMOL/L (ref 0.7–2)
LACTATE SERPL-SCNC: 4 MMOL/L (ref 0.7–2)
LACTATE SERPL-SCNC: 4.6 MMOL/L (ref 0.7–2)
LEUKOCYTE ESTERASE UR QL STRIP: ABNORMAL
MCH RBC QN AUTO: 30.9 PG (ref 26.5–33)
MCHC RBC AUTO-ENTMCNC: 33.7 G/DL (ref 31.5–36.5)
MCV RBC AUTO: 92 FL (ref 78–100)
NITRATE UR QL: NEGATIVE
PH UR STRIP: 5 [PH] (ref 5–7)
PLATELET # BLD AUTO: 332 10E3/UL (ref 150–450)
POTASSIUM SERPL-SCNC: 4.3 MMOL/L (ref 3.4–5.3)
POTASSIUM SERPL-SCNC: 4.3 MMOL/L (ref 3.4–5.3)
PROT SERPL-MCNC: 8.1 G/DL (ref 6.4–8.3)
RBC # BLD AUTO: 4.6 10E6/UL (ref 4.4–5.9)
RBC URINE: >182 /HPF
SODIUM SERPL-SCNC: 136 MMOL/L (ref 135–145)
SODIUM SERPL-SCNC: 141 MMOL/L (ref 135–145)
SP GR UR STRIP: <1.005 (ref 1–1.03)
SPECIMEN EXPIRATION DATE: NORMAL
TSH SERPL DL<=0.005 MIU/L-ACNC: 2.7 UIU/ML (ref 0.3–4.2)
UROBILINOGEN UR STRIP-MCNC: NORMAL MG/DL
WBC # BLD AUTO: 18.5 10E3/UL (ref 4–11)
WBC URINE: 66 /HPF

## 2023-11-28 PROCEDURE — 370N000017 HC ANESTHESIA TECHNICAL FEE, PER MIN: Performed by: STUDENT IN AN ORGANIZED HEALTH CARE EDUCATION/TRAINING PROGRAM

## 2023-11-28 PROCEDURE — 87040 BLOOD CULTURE FOR BACTERIA: CPT | Performed by: INTERNAL MEDICINE

## 2023-11-28 PROCEDURE — 85730 THROMBOPLASTIN TIME PARTIAL: CPT | Performed by: INTERNAL MEDICINE

## 2023-11-28 PROCEDURE — 0T9B70Z DRAINAGE OF BLADDER WITH DRAINAGE DEVICE, VIA NATURAL OR ARTIFICIAL OPENING: ICD-10-PCS | Performed by: STUDENT IN AN ORGANIZED HEALTH CARE EDUCATION/TRAINING PROGRAM

## 2023-11-28 PROCEDURE — 258N000003 HC RX IP 258 OP 636: Performed by: INTERNAL MEDICINE

## 2023-11-28 PROCEDURE — 272N000001 HC OR GENERAL SUPPLY STERILE: Performed by: STUDENT IN AN ORGANIZED HEALTH CARE EDUCATION/TRAINING PROGRAM

## 2023-11-28 PROCEDURE — 120N000001 HC R&B MED SURG/OB

## 2023-11-28 PROCEDURE — 36415 COLL VENOUS BLD VENIPUNCTURE: CPT | Performed by: INTERNAL MEDICINE

## 2023-11-28 PROCEDURE — 250N000011 HC RX IP 250 OP 636: Mod: JZ | Performed by: INTERNAL MEDICINE

## 2023-11-28 PROCEDURE — 80048 BASIC METABOLIC PNL TOTAL CA: CPT | Performed by: INTERNAL MEDICINE

## 2023-11-28 PROCEDURE — 250N000009 HC RX 250: Performed by: STUDENT IN AN ORGANIZED HEALTH CARE EDUCATION/TRAINING PROGRAM

## 2023-11-28 PROCEDURE — 87086 URINE CULTURE/COLONY COUNT: CPT

## 2023-11-28 PROCEDURE — 85610 PROTHROMBIN TIME: CPT | Performed by: INTERNAL MEDICINE

## 2023-11-28 PROCEDURE — 81001 URINALYSIS AUTO W/SCOPE: CPT

## 2023-11-28 PROCEDURE — 999N000141 HC STATISTIC PRE-PROCEDURE NURSING ASSESSMENT: Performed by: STUDENT IN AN ORGANIZED HEALTH CARE EDUCATION/TRAINING PROGRAM

## 2023-11-28 PROCEDURE — 250N000011 HC RX IP 250 OP 636: Performed by: INTERNAL MEDICINE

## 2023-11-28 PROCEDURE — 258N000003 HC RX IP 258 OP 636: Performed by: NURSE ANESTHETIST, CERTIFIED REGISTERED

## 2023-11-28 PROCEDURE — 99233 SBSQ HOSP IP/OBS HIGH 50: CPT | Performed by: INTERNAL MEDICINE

## 2023-11-28 PROCEDURE — 83605 ASSAY OF LACTIC ACID: CPT | Performed by: INTERNAL MEDICINE

## 2023-11-28 PROCEDURE — 250N000013 HC RX MED GY IP 250 OP 250 PS 637: Performed by: INTERNAL MEDICINE

## 2023-11-28 PROCEDURE — 250N000011 HC RX IP 250 OP 636: Performed by: NURSE ANESTHETIST, CERTIFIED REGISTERED

## 2023-11-28 PROCEDURE — 360N000075 HC SURGERY LEVEL 2, PER MIN: Performed by: STUDENT IN AN ORGANIZED HEALTH CARE EDUCATION/TRAINING PROGRAM

## 2023-11-28 PROCEDURE — 85027 COMPLETE CBC AUTOMATED: CPT | Performed by: INTERNAL MEDICINE

## 2023-11-28 PROCEDURE — 86900 BLOOD TYPING SEROLOGIC ABO: CPT | Performed by: INTERNAL MEDICINE

## 2023-11-28 PROCEDURE — 250N000009 HC RX 250: Performed by: NURSE ANESTHETIST, CERTIFIED REGISTERED

## 2023-11-28 PROCEDURE — 0W3R8ZZ CONTROL BLEEDING IN GENITOURINARY TRACT, VIA NATURAL OR ARTIFICIAL OPENING ENDOSCOPIC: ICD-10-PCS | Performed by: STUDENT IN AN ORGANIZED HEALTH CARE EDUCATION/TRAINING PROGRAM

## 2023-11-28 RX ORDER — NALOXONE HYDROCHLORIDE 0.4 MG/ML
0.2 INJECTION, SOLUTION INTRAMUSCULAR; INTRAVENOUS; SUBCUTANEOUS
Status: DISCONTINUED | OUTPATIENT
Start: 2023-11-28 | End: 2023-12-05 | Stop reason: HOSPADM

## 2023-11-28 RX ORDER — CALCIUM CARBONATE/VITAMIN D3 600 MG-10
1 TABLET ORAL 2 TIMES DAILY
COMMUNITY
End: 2023-11-28

## 2023-11-28 RX ORDER — ONDANSETRON 4 MG/1
4 TABLET, ORALLY DISINTEGRATING ORAL EVERY 30 MIN PRN
Status: DISCONTINUED | OUTPATIENT
Start: 2023-11-28 | End: 2023-11-28 | Stop reason: HOSPADM

## 2023-11-28 RX ORDER — FENTANYL CITRATE 50 UG/ML
INJECTION, SOLUTION INTRAMUSCULAR; INTRAVENOUS PRN
Status: DISCONTINUED | OUTPATIENT
Start: 2023-11-28 | End: 2023-11-28

## 2023-11-28 RX ORDER — CEFAZOLIN SODIUM/WATER 2 G/20 ML
2 SYRINGE (ML) INTRAVENOUS SEE ADMIN INSTRUCTIONS
Status: DISCONTINUED | OUTPATIENT
Start: 2023-11-28 | End: 2023-11-28 | Stop reason: HOSPADM

## 2023-11-28 RX ORDER — SODIUM CHLORIDE, SODIUM LACTATE, POTASSIUM CHLORIDE, CALCIUM CHLORIDE 600; 310; 30; 20 MG/100ML; MG/100ML; MG/100ML; MG/100ML
INJECTION, SOLUTION INTRAVENOUS CONTINUOUS PRN
Status: DISCONTINUED | OUTPATIENT
Start: 2023-11-28 | End: 2023-11-28

## 2023-11-28 RX ORDER — FENTANYL CITRATE 50 UG/ML
25 INJECTION, SOLUTION INTRAMUSCULAR; INTRAVENOUS EVERY 5 MIN PRN
Status: DISCONTINUED | OUTPATIENT
Start: 2023-11-28 | End: 2023-11-28 | Stop reason: HOSPADM

## 2023-11-28 RX ORDER — SODIUM CHLORIDE, SODIUM LACTATE, POTASSIUM CHLORIDE, CALCIUM CHLORIDE 600; 310; 30; 20 MG/100ML; MG/100ML; MG/100ML; MG/100ML
INJECTION, SOLUTION INTRAVENOUS CONTINUOUS
Status: DISCONTINUED | OUTPATIENT
Start: 2023-11-28 | End: 2023-11-28 | Stop reason: HOSPADM

## 2023-11-28 RX ORDER — TRAZODONE HYDROCHLORIDE 50 MG/1
25-100 TABLET, FILM COATED ORAL AT BEDTIME
COMMUNITY

## 2023-11-28 RX ORDER — CALCIUM CARBONATE/VITAMIN D3 600 MG-10
1 TABLET ORAL DAILY
Status: ON HOLD | COMMUNITY
End: 2024-06-22

## 2023-11-28 RX ORDER — GLYCINE 1.5 G/100ML
SOLUTION IRRIGATION PRN
Status: DISCONTINUED | OUTPATIENT
Start: 2023-11-28 | End: 2023-11-28 | Stop reason: HOSPADM

## 2023-11-28 RX ORDER — LIDOCAINE 40 MG/G
CREAM TOPICAL
Status: DISCONTINUED | OUTPATIENT
Start: 2023-11-28 | End: 2023-11-28 | Stop reason: HOSPADM

## 2023-11-28 RX ORDER — SODIUM CHLORIDE 9 MG/ML
INJECTION, SOLUTION INTRAVENOUS CONTINUOUS
Status: DISCONTINUED | OUTPATIENT
Start: 2023-11-28 | End: 2023-12-01

## 2023-11-28 RX ORDER — CEFAZOLIN SODIUM/WATER 2 G/20 ML
2 SYRINGE (ML) INTRAVENOUS
Status: DISCONTINUED | OUTPATIENT
Start: 2023-11-28 | End: 2023-11-28 | Stop reason: HOSPADM

## 2023-11-28 RX ORDER — KETAMINE HYDROCHLORIDE 10 MG/ML
INJECTION INTRAMUSCULAR; INTRAVENOUS PRN
Status: DISCONTINUED | OUTPATIENT
Start: 2023-11-28 | End: 2023-11-28

## 2023-11-28 RX ORDER — SIMVASTATIN 10 MG
10 TABLET ORAL AT BEDTIME
COMMUNITY

## 2023-11-28 RX ORDER — HYDROMORPHONE HYDROCHLORIDE 1 MG/ML
0.5 INJECTION, SOLUTION INTRAMUSCULAR; INTRAVENOUS; SUBCUTANEOUS EVERY 4 HOURS PRN
Status: DISCONTINUED | OUTPATIENT
Start: 2023-11-28 | End: 2023-11-30

## 2023-11-28 RX ORDER — PIPERACILLIN SODIUM, TAZOBACTAM SODIUM 3; .375 G/15ML; G/15ML
3.38 INJECTION, POWDER, LYOPHILIZED, FOR SOLUTION INTRAVENOUS EVERY 8 HOURS
Status: DISCONTINUED | OUTPATIENT
Start: 2023-11-28 | End: 2023-11-30

## 2023-11-28 RX ORDER — HYDROMORPHONE HYDROCHLORIDE 1 MG/ML
0.5 INJECTION, SOLUTION INTRAMUSCULAR; INTRAVENOUS; SUBCUTANEOUS ONCE
Status: COMPLETED | OUTPATIENT
Start: 2023-11-28 | End: 2023-11-28

## 2023-11-28 RX ORDER — NALOXONE HYDROCHLORIDE 0.4 MG/ML
0.4 INJECTION, SOLUTION INTRAMUSCULAR; INTRAVENOUS; SUBCUTANEOUS
Status: DISCONTINUED | OUTPATIENT
Start: 2023-11-28 | End: 2023-12-05 | Stop reason: HOSPADM

## 2023-11-28 RX ORDER — ONDANSETRON 2 MG/ML
4 INJECTION INTRAMUSCULAR; INTRAVENOUS EVERY 30 MIN PRN
Status: DISCONTINUED | OUTPATIENT
Start: 2023-11-28 | End: 2023-11-28 | Stop reason: HOSPADM

## 2023-11-28 RX ORDER — FENTANYL CITRATE 50 UG/ML
50 INJECTION, SOLUTION INTRAMUSCULAR; INTRAVENOUS EVERY 5 MIN PRN
Status: DISCONTINUED | OUTPATIENT
Start: 2023-11-28 | End: 2023-11-28 | Stop reason: HOSPADM

## 2023-11-28 RX ORDER — PIPERACILLIN SODIUM, TAZOBACTAM SODIUM 3; .375 G/15ML; G/15ML
3.38 INJECTION, POWDER, LYOPHILIZED, FOR SOLUTION INTRAVENOUS EVERY 8 HOURS
Status: DISCONTINUED | OUTPATIENT
Start: 2023-11-28 | End: 2023-11-28

## 2023-11-28 RX ORDER — GABAPENTIN 300 MG/1
300 CAPSULE ORAL AT BEDTIME
Status: ON HOLD | COMMUNITY
End: 2023-12-05

## 2023-11-28 RX ORDER — PIPERACILLIN SODIUM, TAZOBACTAM SODIUM 3; .375 G/15ML; G/15ML
3.38 INJECTION, POWDER, LYOPHILIZED, FOR SOLUTION INTRAVENOUS ONCE
Status: COMPLETED | OUTPATIENT
Start: 2023-11-28 | End: 2023-11-28

## 2023-11-28 RX ORDER — PROPOFOL 10 MG/ML
INJECTION, EMULSION INTRAVENOUS CONTINUOUS PRN
Status: DISCONTINUED | OUTPATIENT
Start: 2023-11-28 | End: 2023-11-28

## 2023-11-28 RX ORDER — CHLORAL HYDRATE 500 MG
2 CAPSULE ORAL DAILY
COMMUNITY

## 2023-11-28 RX ORDER — CEFAZOLIN SODIUM 1 G/50ML
1250 SOLUTION INTRAVENOUS EVERY 24 HOURS
Status: DISCONTINUED | OUTPATIENT
Start: 2023-11-29 | End: 2023-11-30

## 2023-11-28 RX ORDER — PROPOFOL 10 MG/ML
INJECTION, EMULSION INTRAVENOUS PRN
Status: DISCONTINUED | OUTPATIENT
Start: 2023-11-28 | End: 2023-11-28

## 2023-11-28 RX ADMIN — SODIUM CHLORIDE 1000 ML: 9 INJECTION, SOLUTION INTRAVENOUS at 19:02

## 2023-11-28 RX ADMIN — VANCOMYCIN HYDROCHLORIDE 1500 MG: 5 INJECTION, POWDER, LYOPHILIZED, FOR SOLUTION INTRAVENOUS at 14:02

## 2023-11-28 RX ADMIN — SODIUM CHLORIDE, POTASSIUM CHLORIDE, SODIUM LACTATE AND CALCIUM CHLORIDE 1000 ML: 600; 310; 30; 20 INJECTION, SOLUTION INTRAVENOUS at 04:33

## 2023-11-28 RX ADMIN — FENTANYL CITRATE 50 MCG: 50 INJECTION INTRAMUSCULAR; INTRAVENOUS at 15:52

## 2023-11-28 RX ADMIN — FENTANYL CITRATE 25 MCG: 50 INJECTION INTRAMUSCULAR; INTRAVENOUS at 16:04

## 2023-11-28 RX ADMIN — SODIUM CHLORIDE 1000 ML: 9 INJECTION, SOLUTION INTRAVENOUS at 13:45

## 2023-11-28 RX ADMIN — SODIUM CHLORIDE 1000 ML: 9 INJECTION, SOLUTION INTRAVENOUS at 11:38

## 2023-11-28 RX ADMIN — SODIUM CHLORIDE: 9 INJECTION, SOLUTION INTRAVENOUS at 17:12

## 2023-11-28 RX ADMIN — FENTANYL CITRATE 25 MCG: 50 INJECTION INTRAMUSCULAR; INTRAVENOUS at 15:28

## 2023-11-28 RX ADMIN — SODIUM CHLORIDE, POTASSIUM CHLORIDE, SODIUM LACTATE AND CALCIUM CHLORIDE: 600; 310; 30; 20 INJECTION, SOLUTION INTRAVENOUS at 15:20

## 2023-11-28 RX ADMIN — PHENYLEPHRINE HYDROCHLORIDE 100 MCG: 10 INJECTION INTRAVENOUS at 16:34

## 2023-11-28 RX ADMIN — PIPERACILLIN AND TAZOBACTAM 3.38 G: 3; .375 INJECTION, POWDER, FOR SOLUTION INTRAVENOUS at 23:58

## 2023-11-28 RX ADMIN — HYDROMORPHONE HYDROCHLORIDE 0.5 MG: 1 INJECTION, SOLUTION INTRAMUSCULAR; INTRAVENOUS; SUBCUTANEOUS at 04:08

## 2023-11-28 RX ADMIN — PROPOFOL 30 MG: 10 INJECTION, EMULSION INTRAVENOUS at 15:42

## 2023-11-28 RX ADMIN — KETAMINE HYDROCHLORIDE 30 MG: 10 INJECTION INTRAMUSCULAR; INTRAVENOUS at 15:28

## 2023-11-28 RX ADMIN — ACETAMINOPHEN 650 MG: 325 TABLET ORAL at 02:55

## 2023-11-28 RX ADMIN — SODIUM CHLORIDE, POTASSIUM CHLORIDE, SODIUM LACTATE AND CALCIUM CHLORIDE 1000 ML: 600; 310; 30; 20 INJECTION, SOLUTION INTRAVENOUS at 07:06

## 2023-11-28 RX ADMIN — KETAMINE HYDROCHLORIDE 20 MG: 10 INJECTION INTRAMUSCULAR; INTRAVENOUS at 15:50

## 2023-11-28 RX ADMIN — MIDAZOLAM 2 MG: 1 INJECTION INTRAMUSCULAR; INTRAVENOUS at 15:28

## 2023-11-28 RX ADMIN — HYDROMORPHONE HYDROCHLORIDE 0.5 MG: 1 INJECTION, SOLUTION INTRAMUSCULAR; INTRAVENOUS; SUBCUTANEOUS at 18:45

## 2023-11-28 RX ADMIN — SODIUM CHLORIDE: 9 INJECTION, SOLUTION INTRAVENOUS at 21:04

## 2023-11-28 RX ADMIN — PIPERACILLIN AND TAZOBACTAM 3.38 G: 3; .375 INJECTION, POWDER, FOR SOLUTION INTRAVENOUS at 10:23

## 2023-11-28 RX ADMIN — HYDROMORPHONE HYDROCHLORIDE 0.5 MG: 1 INJECTION, SOLUTION INTRAMUSCULAR; INTRAVENOUS; SUBCUTANEOUS at 08:15

## 2023-11-28 RX ADMIN — HYDROMORPHONE HYDROCHLORIDE 0.5 MG: 1 INJECTION, SOLUTION INTRAMUSCULAR; INTRAVENOUS; SUBCUTANEOUS at 21:20

## 2023-11-28 RX ADMIN — PROPOFOL 50 MCG/KG/MIN: 10 INJECTION, EMULSION INTRAVENOUS at 15:36

## 2023-11-28 ASSESSMENT — ACTIVITIES OF DAILY LIVING (ADL)
ADLS_ACUITY_SCORE: 39
ADLS_ACUITY_SCORE: 35
ADLS_ACUITY_SCORE: 41
ADLS_ACUITY_SCORE: 41
ADLS_ACUITY_SCORE: 35
ADLS_ACUITY_SCORE: 39
ADLS_ACUITY_SCORE: 41

## 2023-11-28 ASSESSMENT — LIFESTYLE VARIABLES: TOBACCO_USE: 1
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
## 2023-11-28 NOTE — ED NOTES
Bed: [place] [address]  Expected date:   Expected time:   Means of arrival:   Comments:  Hold room #19
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
## 2023-11-28 NOTE — PHARMACY-ADMISSION MEDICATION HISTORY
Pharmacist Admission Medication History    Admission medication history is complete. The information provided in this note is only as accurate as the sources available at the time of the update.    Information Source(s): Patient, Family member, Clinic records, and CareEverywhere/SureScripts via in-person and phone    Pertinent Information: medication list updated to reflect most recent clinic visit which was further corroborated with pharmacy dispensing data    Changes made to PTA medication list:  Added: diclofenac, fish oil, simvastatin, trazodone, ca+d  Deleted: albendazole, prn bowel regimen, prednisone taper, Zantac  Changed: APAP, gabapentin, omeprazole, vitamin D, multiple duplicates     Medication Affordability:       Allergies reviewed with patient and updates made in EHR: yes    Medication History Completed By: Pipe Guzmán [facility] 11/28/2023 8:27 AM    Prior to Admission medications    Medication Sig Last Dose Taking? Auth Provider Long Term End Date   acetaminophen (TYLENOL) 650 MG CR tablet Take 1,300 mg by mouth every 8 hours  Yes Provider, Historical     ARIPiprazole (ABILIFY) 5 MG tablet Take 5 mg by mouth daily  Yes Provider, Historical     calcium carbonate-vitamin D (CALTRATE) 600-10 MG-MCG per tablet Take 1 tablet by mouth daily  Yes Unknown, Entered By History     diclofenac (VOLTAREN) 1 % topical gel Apply 2 g topically 4 times daily as needed for moderate pain (right knee)  Yes Unknown, Entered By History     fish oil-omega-3 fatty acids 1000 MG capsule Take 2 g by mouth daily  Yes Unknown, Entered By History     FLUoxetine (PROZAC) 20 MG capsule Take 20 mg by mouth daily  Yes Provider, Historical     gabapentin (NEURONTIN) 300 MG capsule Take 300 mg by mouth at bedtime  Yes Unknown, Entered By History     levothyroxine (SYNTHROID, LEVOTHROID) 25 MCG tablet [LEVOTHYROXINE (SYNTHROID, LEVOTHROID) 25 MCG TABLET] Take 1 tablet by mouth daily.  Yes Provider, Historical     multivitamin (ONE A  DAY) per tablet Take 1 tablet by mouth daily  Yes Provider, Historical     omeprazole (PRILOSEC) 20 MG capsule Take 20 mg by mouth daily  Yes Provider, Historical     simvastatin (ZOCOR) 10 MG tablet Take 10 mg by mouth at bedtime  Yes Unknown, Entered By History Yes    traZODone (DESYREL) 50 MG tablet Take  mg by mouth at bedtime  Yes Unknown, Entered By History Yes    VITAMIN D3 2,000 unit capsule Take 4,000 Units by mouth daily  Yes Provider, Historical

## 2023-11-28 NOTE — PROGRESS NOTES
[facility]    Hospitalist Progress Note    Assessment & Plan   68 year old Simona Speaking male who was admitted on 11/27/2023 difficulty voiding and gross hematuria.      Impression:   Principal Problem:    Severe sepsis (H)    -- aggressive IV hydration and IV Zosyn and Vanco        Lactic acidosis -- suspect related to sepsis   -- Lactate has slowly climbed to 4.6, although clinically appears improving, will continue fluids and repeat BMP and Lactate at 2100      Urinary retention -- suspect 2nd to clots   -- had cystoscopy with clot removal this afternoon      Joseluis hematuria -- suspected to be from prostate vessels, no bladder lesions seen      MAUREEN (acute kidney injury) (H24) -- suspect from sepsis and transient hypotension   -- continue IV fluids and treatment of sepsis   -- hypotension resolved with fluids      Plan:  as above, discussed with Urology    DVT Prophylaxis: Pneumatic Compression Devices  Code Status: Full Code    Disposition: Expected discharge in 2-3 days    Toby James MD  Pager [phone]  Cell Phone [phone]  Text Page (7am to 6pm)  (50 min total)    Interval History   Appeared comfortable this AM, denied back pain.  Chase with gross blood in it.     Physical Exam   Temp: 98.9  F (37.2  C) Temp src: Oral BP: 127/75 Pulse: 117   Resp: 22 SpO2: 93 % O2 Device: None (Room air)    Vitals:    11/28/23 0615   Weight: 65.4 kg (144 lb 1.6 oz)     Vital Signs with Ranges  Temp:  [97.6  F (36.4  C)-98.9  F (37.2  C)] 98.9  F (37.2  C)  Pulse:  [] 117  Resp:  [14-35] 22  BP: ()/(52-89) 127/75  SpO2:  [92 %-99 %] 93 %  I/O last 3 completed shifts:  In: 1000 [IV Piggyback:1000]  Out: -2450     # Pain Assessment:      11/28/2023     5:45 PM   Current Pain Score   Patient currently in pain? no   Pah s pain level was assessed and he currently denies pain.        Constitutional: Awake, alert, cooperative, no apparent distress  Respiratory: Clear to  auscultation bilaterally, no crackles or wheezing  Cardiovascular: Regular rate and rhythm, normal S1 and S2, and no murmur noted  GI: Normal bowel sounds, soft, non-distended, non-tender  Extrem: No calf tenderness, no ankle edema  Neuro: Appeared appropriate this AM, no focal motor or sensory deficits    Medications    sodium chloride 125 mL/hr at 11/28/23 1712      piperacillin-tazobactam  3.375 g Intravenous Q8H    sodium chloride (PF)  3 mL Intracatheter Q8H    sodium chloride 0.9%  1,000 mL Intravenous Once    [START ON 11/29/2023] vancomycin  1,250 mg Intravenous Q24H       Data   Recent Labs   Lab 11/28/23  0636 11/28/23  0011 11/27/23  1830   WBC 18.5*  --  11.8*   HGB 14.2  --  17.1   MCV 92  --  88     --  292   INR 1.05 1.01  --      --  136   POTASSIUM 4.3  --  3.8   CHLORIDE 101  --  100   CO2 23  --  26   BUN 14.1  --  12.5   CR 1.27*  --  0.95   ANIONGAP 12  --  10   KETAN 9.4  --  10.0   *  --  134*   ALBUMIN  --   --  4.6   PROTTOTAL  --   --  8.1   BILITOTAL  --   --  0.7   ALKPHOS  --   --  100   ALT  --   --  48   AST  --   --  41       Imaging:   Recent Results (from the past 24 hour(s))   CT Abdomen Pelvis w Contrast    Narrative    EXAM: CT ABDOMEN PELVIS W CONTRAST  LOCATION: [facility]  DATE: 11/27/2023    INDICATION: abdominal pain, urinary retention  COMPARISON: CT 04/29/2019  TECHNIQUE: CT scan of the abdomen and pelvis was performed following injection of IV contrast. Multiplanar reformats were obtained. Dose reduction techniques were used.  CONTRAST: ISOVUE 370 75ML    FINDINGS:   LOWER CHEST: Enlarged heart. Lung bases are grossly clear.    HEPATOBILIARY: Diffuse hepatic steatosis. No evidence of biliary obstruction.    PANCREAS: Normal.    SPLEEN: Normal.    ADRENAL GLANDS: Normal.    KIDNEYS/BLADDER: Likely small bilateral renal cysts, no specific follow-up recommended. No hydronephrosis. Urinary bladder is decompressed by Chase catheter.  Possible hyperdense contrast the left aspect of the urinary bladder with separate subtly   hyperdense material surrounding the Chase catheter itself, could represent blood products (for example series 3 image 187).    BOWEL: No obstruction or inflammatory change. Normal appendix. Old torsed epiploic appendage adjacent to the descending colon.    LYMPH NODES: No suspicious lymphadenopathy.    VASCULATURE: Moderate calcified and noncalcified atherosclerosis.    PELVIC ORGANS: Stable prostatomegaly.    MUSCULOSKELETAL: Multiple healed pelvic fractures, stable from prior exam. No acute bony abnormality.        Impression    IMPRESSION:   1.  Possible intraluminal blood products surrounding the Chase catheter within the urinary bladder. No associated hydronephrosis. Could consider follow-up CT urogram if symptoms persist.  2.  Diffuse hepatic steatosis.

## 2023-11-28 NOTE — ED PROVIDER NOTES
Emergency Department Midlevel Supervisory Note     I personally saw the patient and performed a substantive portion of the visit including all aspects of the medical decision making.    ED Course:  10:29 PM Georgina Menendez PA-C staffed patient with me. I agree with their assessment and plan of management, and I will see the patient.  11:05 PM I met with the patient to introduce myself, gather additional history, perform my initial exam, and discuss the plan.     Brief HPI:     Rere Robbins is a 68 year old male who presents for evaluation of dysuria.    I, Maddison Bond, am serving as a scribe to document services personally performed by Laxmi Iyer, based on my observations and the provider's statements to me.   I, Laxmi Iyer attest that Maddison Bond was acting in a scribe capacity, has observed my performance of the services and has documented them in accordance with my direction.    Brief Physical Exam: BP (!) 158/72   Pulse 102   Temp 97.7  F (36.5  C) (Oral)   Resp 24   SpO2 96%   Constitutional:  Alert, in no acute distress  EYES: Conjunctivae clear  HENT:  Atraumatic, normocephalic  Respiratory:  Respirations even, unlabored, in no acute respiratory distress  Cardiovascular:  Regular rate and rhythm, good peripheral perfusion  GI: Soft, nondistended, nontender, no palpable masses, no rebound, no guarding   Musculoskeletal:  No edema. No cyanosis. Range of motion major extremities intact.    Integument: Warm, Dry, No erythema, No rash.   Neurologic:  Alert & oriented, no focal deficits noted  Psych: Normal mood and affect     MDM:  Rere Robbins is a 68-year-old male who presented for evaluation of urinary retention.  Patient was initially evaluated by PA who placed orders for labs as well as CT abdomen/pelvis.  Patient was bladder scanned for 440 cc of urine and had a Chase catheter placed with large amount of radha hematuria and clots.  Dr. Almanza with [facility] was consulted and recommended continuous  irrigation and admission.  They plan to evaluate the patient tomorrow and likely proceed with CT urogram.    Laboratory work-up was reassuring without acute anemia, significant leukocytosis, or evidence of acute kidney injury.  UA was not sent due to significant blood products within the urine.  CT abdomen/pelvis did show possible intraluminal blood products surrounding the Chase catheter within the urinary bladder but no associated hydronephrosis.  PA discussed the case with Dr. Marti, hospitalist, who agreed to facilitate admission.       Impression:  1. Urinary retention    2. Joseluis hematuria        Labs and Imaging:  Results for orders placed or performed during the hospital encounter of 11/27/23   CT Abdomen Pelvis w Contrast    Impression    IMPRESSION:   1.  Possible intraluminal blood products surrounding the Chase catheter within the urinary bladder. No associated hydronephrosis. Could consider follow-up CT urogram if symptoms persist.  2.  Diffuse hepatic steatosis.   CBC (+ platelets, no diff)   Result Value Ref Range    WBC Count 11.8 (H) 4.0 - 11.0 10e3/uL    RBC Count 5.55 4.40 - 5.90 10e6/uL    Hemoglobin 17.1 13.3 - 17.7 g/dL    Hematocrit 48.8 40.0 - 53.0 %    MCV 88 78 - 100 fL    MCH 30.8 26.5 - 33.0 pg    MCHC 35.0 31.5 - 36.5 g/dL    RDW 13.4 10.0 - 15.0 %    Platelet Count 292 150 - 450 10e3/uL   Basic metabolic panel   Result Value Ref Range    Sodium 136 135 - 145 mmol/L    Potassium 3.8 3.4 - 5.3 mmol/L    Chloride 100 98 - 107 mmol/L    Carbon Dioxide (CO2) 26 22 - 29 mmol/L    Anion Gap 10 7 - 15 mmol/L    Urea Nitrogen 12.5 8.0 - 23.0 mg/dL    Creatinine 0.95 0.67 - 1.17 mg/dL    GFR Estimate 88 >60 mL/min/1.73m2    Calcium 10.0 8.8 - 10.2 mg/dL    Glucose 134 (H) 70 - 99 mg/dL   Result Value Ref Range    INR 1.01 0.85 - 1.15   Partial thromboplastin time   Result Value Ref Range    aPTT 26 22 - 38 Seconds   Hepatic panel   Result Value Ref Range    Protein Total 8.1 6.4 - 8.3 g/dL     Albumin 4.6 3.5 - 5.2 g/dL    Bilirubin Total 0.7 <=1.2 mg/dL    Alkaline Phosphatase 100 40 - 150 U/L    AST 41 0 - 45 U/L    ALT 48 0 - 70 U/L    Bilirubin Direct <0.20 0.00 - 0.30 mg/dL   TSH with free T4 reflex   Result Value Ref Range    TSH 2.70 0.30 - 4.20 uIU/mL   Extra Green Top (Lithium Heparin) Tube   Result Value Ref Range    Hold Specimen JIC    Extra Purple Top Tube   Result Value Ref Range    Hold Specimen JIC      I have reviewed the relevant laboratory and radiology studies      Laxmi Iyer MD  [facility] EMERGENCY DEPARTMENT  [address] [address] [phone]       Laxmi Iyer MD  11/28/23 [id number]

## 2023-11-28 NOTE — ANESTHESIA CARE TRANSFER NOTE
Patient: Rere Pwar    Procedure: Procedure(s):  CYSTOSCOPY, WITH FULGURATION OF HEMORRHAGING BLOOD VESSEL AND THROMBUS REMOVAL       Diagnosis: Joseluis hematuria [R31.0]  Diagnosis Additional Information: No value filed.    Anesthesia Type:   MAC     Note:    Oropharynx: oropharynx clear of all foreign objects and spontaneously breathing  Level of Consciousness: drowsy  Oxygen Supplementation: room air        Vital Signs Stable: post-procedure vital signs reviewed and stable  Report to RN Given: handoff report given  Patient transferred to: Medical/Surgical Unit    Handoff Report: Identifed the Patient, Identified the Reponsible Provider, Reviewed the pertinent medical history, Discussed the surgical course, Reviewed Intra-OP anesthesia mangement and issues during anesthesia, Set expectations for post-procedure period and Allowed opportunity for questions and acknowledgement of understanding      Vitals:  Vitals Value Taken Time   BP     Temp     Pulse     Resp     SpO2         Electronically Signed By: MICHAELLE Pompa CRNA  November 28, 2023  4:48 PM
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
## 2023-11-28 NOTE — INTERVAL H&P NOTE
"I have reviewed the surgical (or preoperative) H&P that is linked to this encounter, and examined the patient. There are no significant changes    Clinical Conditions Present on Arrival:  Clinically Significant Risk Factors Present on Admission                  # Overweight: Estimated body mass index is 26.79 kg/m  as calculated from the following:    Height as of this encounter: 1.562 m (5' 1.5\").    Weight as of this encounter: 65.4 kg (144 lb 1.6 oz).       "
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
## 2023-11-28 NOTE — PLAN OF CARE
Problem: Urinary Retention  Goal: Effective Urinary Elimination  Outcome: Progressing   Goal Outcome Evaluation:       Pah has CBI.  His output started out almost pure blood.    His irrigation has been wide open all night, and he has required many clots to be manually removed in order to keep it flowing.  Hospitalist & urologist notified.  He hasn't had any clots in the last hour and his irrigations have become a little lighter in color.  Problem: Dysrhythmia  Goal: Normalized Cardiac Rhythm  Outcome: Progressing   He started out with a heart rate of 102 at midnight, but by 0330, his rate was in the 120s.  He was placed on tele and given a 1 liter bolus of LR.  His heart rate is back in the low 100s now.  Problem: Pain Acute  Goal: Optimal Pain Control and Function  Outcome: Progressing   He had prn tylenol at 03 with slight relief.  He had 0.5 of dilaudid at 04 and was able to get a little sleep.

## 2023-11-28 NOTE — OP NOTE
Date of Surgery: 11/28/23    Pre-operative Diagnosis:  Clot urinary retention    Post-operative Diagnosis:   Clot urinary retention    Procedure:   Cystoscopy with clot evacuation and fulguration  Complex fair catheter insertion    Surgeon: Eliazar Almanza MD    Anesthesia: General    Estimated Blood Loss: 20 ml, large volume old clot burden irrigated out    Complications: None    Specimens:  None    Findings:   Successful evacuation of clot burden. Bleeding likely from prostatic varices, no evidence of malignancy    Indication for Procedure: 69yo M developed clot urinary retention which has been refractory to CBI. Patient presents now for cystoscopy with clot evacuation and fulguration.     Summary of Procedure:  After informed consent was obtained the patient was brought back to the operating room and placed in lithotomy position after induction of general anesthesia. A time out was performed.    Resectoscope was passed per urethra into the bladder. There were no urethral strictures. Large volume clot burden was irrigated out.     Cystoscopy was then performed showing marked inflammation of the bladder mucosa with no discrete tumors. Uos unable to be identified. Prostate was noted to be hypervascular and pinpoint cautery was used for hemostasis of several bleeding vessels at the bladder neck. Prostate was 4cm long, trilobar and markedly obstructing.     At this stage there was no further significant bleeding. Resectoscope was removed and 22Fr 3-way fair catheter was passed per urethra into the bladder, 30cc sterile water instilled in the balloon, concluding the procedure.     Disposition:  -Continue CBI overnight, titrate to light pink

## 2023-11-28 NOTE — PROVIDER NOTIFICATION
Hospitalist notified of increased/continual blood clots needing a lot of irrigations with his CBI.

## 2023-11-28 NOTE — PLAN OF CARE
1400 had to hand irrigate fair catheter @ this time, as fair has stopped draining and is complaining of discomfort. Hand irrigated until I was no longer removing clots and CBI could run and fair catheter could drain.    1430 Patient transferring to [facility] for surgery @ this time. IV bolus is running, Vancomycin was running as was the CBI.

## 2023-11-28 NOTE — PHARMACY-VANCOMYCIN DOSING SERVICE
"Pharmacy Vancomycin Initial Note  Date of Service 2023  Patient's  1955  68 year old, male    Indication: Sepsis    Current estimated CrCl = Estimated Creatinine Clearance: 47.2 mL/min (A) (based on SCr of 1.27 mg/dL (H)).    Creatinine for last 3 days  2023:  6:30 PM Creatinine 0.95 mg/dL  2023:  6:36 AM Creatinine 1.27 mg/dL    Recent Vancomycin Level(s) for last 3 days  No results found for requested labs within last 3 days.      Vancomycin IV Administrations (past 72 hours)        No vancomycin orders with administrations in past 72 hours.                    Nephrotoxins and other renal medications (From now, onward)      Start     Dose/Rate Route Frequency Ordered Stop    23 0600  vancomycin (VANCOCIN) 1,250 mg in sodium chloride 0.9 % 250 mL intermittent infusion         1,250 mg  over 90 Minutes Intravenous EVERY 24 HOURS 23 1047      23 1600  piperacillin-tazobactam (ZOSYN) 3.375 g vial to attach to  mL bag        Note to Pharmacy: For [facility], [facility] and [facility]: For Zosyn-naive patients, use the \"Zosyn initial dose + extended infusion\" order panel.    3.375 g  over 240 Minutes Intravenous EVERY 8 HOURS 23 1044      23 0700  piperacillin-tazobactam (ZOSYN) 3.375 g vial to attach to  mL bag         3.375 g  over 30 Minutes Intravenous ONCE 23 0649      23 0700  vancomycin (VANCOCIN) 1,500 mg in sodium chloride 0.9 % 250 mL intermittent infusion        Note to Pharmacy: Based on prior wt of ~ 62 kg; 1500 mg ~24 mg/kg.    1,500 mg  over 90 Minutes Intravenous ONCE 23 0703              Contrast Orders - past 72 hours (72h ago, onward)      Start     Dose/Rate Route Frequency Stop    23 2030  iopamidol (ISOVUE-370) solution 75 mL         75 mL Intravenous ONCE 23            [employer] Prediction of Planned Initial Vancomycin Regimen  Loading dose: 1500 mg at 11:00 2023.  Regimen: 1250 mg IV every 24 " hours.  Start time: 11:00 on 11/29/2023  Exposure target: AUC24 (range)400-600 mg/L.hr   AUC24,ss: 514 mg/L.hr  Probability of AUC24 > 400: 77 %  Ctrough,ss: 15.1 mg/L  Probability of Ctrough,ss > 20: 25 %  Probability of nephrotoxicity (Lodise YANA 2009): 10 %          Plan:  Start vancomycin  1500 mg IV loading dose followed by 1250 mg IV every 24 hours  Vancomycin monitoring method: AUC  Vancomycin therapeutic monitoring goal: 400-600 mg*h/L  Pharmacy will check vancomycin levels as appropriate in 1-3 Days.    Serum creatinine levels will be ordered daily for the first week of therapy and at least twice weekly for subsequent weeks.      Pipe Guzmán, [facility]

## 2023-11-28 NOTE — CARE PLAN
"PRIMARY DIAGNOSIS: \"GENERIC\" NURSING  OUTPATIENT/OBSERVATION GOALS TO BE MET BEFORE DISCHARGE:  ADLs back to baseline: No    Activity and level of assistance: bedrest with CBI    Pain status: Improved but still requiring IV narcotics.    Return to near baseline physical activity: No     Discharge Planner Nurse   Safe discharge environment identified: No  Barriers to discharge: Yes       Entered by: Zoey Barnett RN 11/28/2023 10:49 AM     Please review provider order for any additional goals.   Nurse to notify provider when observation goals have been met and patient is ready for discharge.  Pt reported 10/10 pain. Irrigation resulted in multiple dislodged clots. Pt given IV dilaudid, Zosyn started off schedule to accommodate lab draw for BC.   Total CBI intake: 7500 mL  Total CBI output: 6,050 mL  Pt hypotensive, MAP in 60's, tachy in 120's, lactic acid 3.4, provider notified, awaiting reponse  "

## 2023-11-29 ENCOUNTER — APPOINTMENT (OUTPATIENT)
Dept: ULTRASOUND IMAGING | Facility: HOSPITAL | Age: 68
DRG: 717 | End: 2023-11-29
Attending: NURSE PRACTITIONER
Payer: COMMERCIAL

## 2023-11-29 ENCOUNTER — ANESTHESIA EVENT (OUTPATIENT)
Dept: SURGERY | Facility: HOSPITAL | Age: 68
DRG: 717 | End: 2023-11-29
Payer: COMMERCIAL

## 2023-11-29 ENCOUNTER — APPOINTMENT (OUTPATIENT)
Dept: RADIOLOGY | Facility: HOSPITAL | Age: 68
DRG: 717 | End: 2023-11-29
Attending: UROLOGY
Payer: COMMERCIAL

## 2023-11-29 ENCOUNTER — ANESTHESIA (OUTPATIENT)
Dept: SURGERY | Facility: HOSPITAL | Age: 68
DRG: 717 | End: 2023-11-29
Payer: COMMERCIAL

## 2023-11-29 ENCOUNTER — APPOINTMENT (OUTPATIENT)
Dept: CT IMAGING | Facility: HOSPITAL | Age: 68
DRG: 717 | End: 2023-11-29
Attending: NURSE PRACTITIONER
Payer: COMMERCIAL

## 2023-11-29 LAB
ANION GAP SERPL CALCULATED.3IONS-SCNC: 10 MMOL/L (ref 7–15)
BACTERIA UR CULT: NO GROWTH
BUN SERPL-MCNC: 20.9 MG/DL (ref 8–23)
CALCIUM SERPL-MCNC: 7.9 MG/DL (ref 8.8–10.2)
CHLORIDE SERPL-SCNC: 111 MMOL/L (ref 98–107)
CREAT SERPL-MCNC: 2.55 MG/DL (ref 0.67–1.17)
DEPRECATED HCO3 PLAS-SCNC: 21 MMOL/L (ref 22–29)
EGFRCR SERPLBLD CKD-EPI 2021: 27 ML/MIN/1.73M2
EPO SERPL-ACNC: 14 MU/ML
ERYTHROCYTE [DISTWIDTH] IN BLOOD BY AUTOMATED COUNT: 14 % (ref 10–15)
ERYTHROCYTE [DISTWIDTH] IN BLOOD BY AUTOMATED COUNT: 14.4 % (ref 10–15)
GLUCOSE SERPL-MCNC: 146 MG/DL (ref 70–99)
HCT VFR BLD AUTO: 27.1 % (ref 40–53)
HCT VFR BLD AUTO: 28 % (ref 40–53)
HGB BLD-MCNC: 9.2 G/DL (ref 13.3–17.7)
HGB BLD-MCNC: 9.2 G/DL (ref 13.3–17.7)
HOLD SPECIMEN: NORMAL
LACTATE SERPL-SCNC: 1.5 MMOL/L (ref 0.7–2)
LACTATE SERPL-SCNC: 1.7 MMOL/L (ref 0.7–2)
MCH RBC QN AUTO: 30.9 PG (ref 26.5–33)
MCH RBC QN AUTO: 31.3 PG (ref 26.5–33)
MCHC RBC AUTO-ENTMCNC: 32.9 G/DL (ref 31.5–36.5)
MCHC RBC AUTO-ENTMCNC: 33.9 G/DL (ref 31.5–36.5)
MCV RBC AUTO: 92 FL (ref 78–100)
MCV RBC AUTO: 94 FL (ref 78–100)
PLATELET # BLD AUTO: 156 10E3/UL (ref 150–450)
PLATELET # BLD AUTO: 191 10E3/UL (ref 150–450)
POTASSIUM SERPL-SCNC: 3.9 MMOL/L (ref 3.4–5.3)
RBC # BLD AUTO: 2.94 10E6/UL (ref 4.4–5.9)
RBC # BLD AUTO: 2.98 10E6/UL (ref 4.4–5.9)
SODIUM SERPL-SCNC: 142 MMOL/L (ref 135–145)
WBC # BLD AUTO: 13.2 10E3/UL (ref 4–11)
WBC # BLD AUTO: 17.1 10E3/UL (ref 4–11)

## 2023-11-29 PROCEDURE — 250N000011 HC RX IP 250 OP 636: Performed by: INTERNAL MEDICINE

## 2023-11-29 PROCEDURE — 250N000011 HC RX IP 250 OP 636: Mod: JZ | Performed by: INTERNAL MEDICINE

## 2023-11-29 PROCEDURE — 999N000141 HC STATISTIC PRE-PROCEDURE NURSING ASSESSMENT: Performed by: UROLOGY

## 2023-11-29 PROCEDURE — 250N000025 HC SEVOFLURANE, PER MIN: Performed by: UROLOGY

## 2023-11-29 PROCEDURE — 258N000003 HC RX IP 258 OP 636: Performed by: INTERNAL MEDICINE

## 2023-11-29 PROCEDURE — 80048 BASIC METABOLIC PNL TOTAL CA: CPT | Performed by: INTERNAL MEDICINE

## 2023-11-29 PROCEDURE — 272N000001 HC OR GENERAL SUPPLY STERILE: Performed by: UROLOGY

## 2023-11-29 PROCEDURE — 120N000001 HC R&B MED SURG/OB

## 2023-11-29 PROCEDURE — 250N000011 HC RX IP 250 OP 636: Performed by: ANESTHESIOLOGY

## 2023-11-29 PROCEDURE — 250N000011 HC RX IP 250 OP 636: Performed by: NURSE ANESTHETIST, CERTIFIED REGISTERED

## 2023-11-29 PROCEDURE — 258N000003 HC RX IP 258 OP 636: Performed by: ANESTHESIOLOGY

## 2023-11-29 PROCEDURE — 710N000011 HC RECOVERY PHASE 1, LEVEL 3, PER MIN: Performed by: UROLOGY

## 2023-11-29 PROCEDURE — 83605 ASSAY OF LACTIC ACID: CPT | Performed by: INTERNAL MEDICINE

## 2023-11-29 PROCEDURE — 74176 CT ABD & PELVIS W/O CONTRAST: CPT

## 2023-11-29 PROCEDURE — 250N000009 HC RX 250: Performed by: NURSE ANESTHETIST, CERTIFIED REGISTERED

## 2023-11-29 PROCEDURE — C1769 GUIDE WIRE: HCPCS | Performed by: UROLOGY

## 2023-11-29 PROCEDURE — 999N000182 XR SURGERY CARM FLUORO GREATER THAN 5 MIN

## 2023-11-29 PROCEDURE — 255N000002 HC RX 255 OP 636: Performed by: UROLOGY

## 2023-11-29 PROCEDURE — 0T1B0ZD BYPASS BLADDER TO CUTANEOUS, OPEN APPROACH: ICD-10-PCS | Performed by: UROLOGY

## 2023-11-29 PROCEDURE — 36415 COLL VENOUS BLD VENIPUNCTURE: CPT | Performed by: INTERNAL MEDICINE

## 2023-11-29 PROCEDURE — 85027 COMPLETE CBC AUTOMATED: CPT | Performed by: INTERNAL MEDICINE

## 2023-11-29 PROCEDURE — 360N000082 HC SURGERY LEVEL 2 W/ FLUORO, PER MIN: Performed by: UROLOGY

## 2023-11-29 PROCEDURE — 76770 US EXAM ABDO BACK WALL COMP: CPT

## 2023-11-29 PROCEDURE — 85027 COMPLETE CBC AUTOMATED: CPT | Performed by: NURSE ANESTHETIST, CERTIFIED REGISTERED

## 2023-11-29 PROCEDURE — 370N000017 HC ANESTHESIA TECHNICAL FEE, PER MIN: Performed by: UROLOGY

## 2023-11-29 PROCEDURE — 76870 US EXAM SCROTUM: CPT

## 2023-11-29 PROCEDURE — 250N000013 HC RX MED GY IP 250 OP 250 PS 637: Performed by: INTERNAL MEDICINE

## 2023-11-29 PROCEDURE — 99233 SBSQ HOSP IP/OBS HIGH 50: CPT | Performed by: INTERNAL MEDICINE

## 2023-11-29 PROCEDURE — 258N000003 HC RX IP 258 OP 636: Performed by: NURSE ANESTHETIST, CERTIFIED REGISTERED

## 2023-11-29 PROCEDURE — 0W9G00Z DRAINAGE OF PERITONEAL CAVITY WITH DRAINAGE DEVICE, OPEN APPROACH: ICD-10-PCS | Performed by: UROLOGY

## 2023-11-29 RX ORDER — FENTANYL CITRATE 50 UG/ML
INJECTION, SOLUTION INTRAMUSCULAR; INTRAVENOUS PRN
Status: DISCONTINUED | OUTPATIENT
Start: 2023-11-29 | End: 2023-11-30

## 2023-11-29 RX ORDER — SIMVASTATIN 10 MG
10 TABLET ORAL AT BEDTIME
Status: DISCONTINUED | OUTPATIENT
Start: 2023-11-29 | End: 2023-12-05 | Stop reason: HOSPADM

## 2023-11-29 RX ORDER — ACETAMINOPHEN 325 MG/1
975 TABLET ORAL ONCE
Status: DISCONTINUED | OUTPATIENT
Start: 2023-11-29 | End: 2023-12-05 | Stop reason: HOSPADM

## 2023-11-29 RX ORDER — DEXAMETHASONE SODIUM PHOSPHATE 10 MG/ML
INJECTION, SOLUTION INTRAMUSCULAR; INTRAVENOUS PRN
Status: DISCONTINUED | OUTPATIENT
Start: 2023-11-29 | End: 2023-11-30

## 2023-11-29 RX ORDER — TRAZODONE HYDROCHLORIDE 50 MG/1
50 TABLET, FILM COATED ORAL AT BEDTIME
Status: DISCONTINUED | OUTPATIENT
Start: 2023-11-29 | End: 2023-12-05 | Stop reason: HOSPADM

## 2023-11-29 RX ORDER — PROPOFOL 10 MG/ML
INJECTION, EMULSION INTRAVENOUS PRN
Status: DISCONTINUED | OUTPATIENT
Start: 2023-11-29 | End: 2023-11-30

## 2023-11-29 RX ORDER — SODIUM CHLORIDE, SODIUM LACTATE, POTASSIUM CHLORIDE, CALCIUM CHLORIDE 600; 310; 30; 20 MG/100ML; MG/100ML; MG/100ML; MG/100ML
INJECTION, SOLUTION INTRAVENOUS CONTINUOUS
Status: DISCONTINUED | OUTPATIENT
Start: 2023-11-29 | End: 2023-11-29 | Stop reason: HOSPADM

## 2023-11-29 RX ORDER — PANTOPRAZOLE SODIUM 40 MG/1
40 TABLET, DELAYED RELEASE ORAL
Status: DISCONTINUED | OUTPATIENT
Start: 2023-11-30 | End: 2023-12-05 | Stop reason: HOSPADM

## 2023-11-29 RX ORDER — FENTANYL CITRATE 50 UG/ML
25-50 INJECTION, SOLUTION INTRAMUSCULAR; INTRAVENOUS
Status: COMPLETED | OUTPATIENT
Start: 2023-11-29 | End: 2023-11-29

## 2023-11-29 RX ORDER — ONDANSETRON 2 MG/ML
INJECTION INTRAMUSCULAR; INTRAVENOUS PRN
Status: DISCONTINUED | OUTPATIENT
Start: 2023-11-29 | End: 2023-11-30

## 2023-11-29 RX ORDER — ARIPIPRAZOLE 5 MG/1
5 TABLET ORAL DAILY
Status: DISCONTINUED | OUTPATIENT
Start: 2023-11-30 | End: 2023-12-05 | Stop reason: HOSPADM

## 2023-11-29 RX ORDER — ALBUTEROL SULFATE 5 MG/ML
2.5 SOLUTION RESPIRATORY (INHALATION) EVERY 6 HOURS PRN
Status: DISCONTINUED | OUTPATIENT
Start: 2023-11-29 | End: 2023-11-30 | Stop reason: HOSPADM

## 2023-11-29 RX ORDER — GABAPENTIN 300 MG/1
300 CAPSULE ORAL AT BEDTIME
Status: DISCONTINUED | OUTPATIENT
Start: 2023-11-29 | End: 2023-11-30

## 2023-11-29 RX ORDER — LIDOCAINE 40 MG/G
CREAM TOPICAL
Status: DISCONTINUED | OUTPATIENT
Start: 2023-11-29 | End: 2023-11-29 | Stop reason: HOSPADM

## 2023-11-29 RX ORDER — LEVOTHYROXINE SODIUM 25 UG/1
25 TABLET ORAL DAILY
Status: DISCONTINUED | OUTPATIENT
Start: 2023-11-30 | End: 2023-12-05 | Stop reason: HOSPADM

## 2023-11-29 RX ORDER — LIDOCAINE HYDROCHLORIDE 10 MG/ML
INJECTION, SOLUTION INFILTRATION; PERINEURAL PRN
Status: DISCONTINUED | OUTPATIENT
Start: 2023-11-29 | End: 2023-11-30

## 2023-11-29 RX ADMIN — Medication 100 MG: at 22:10

## 2023-11-29 RX ADMIN — HYDROMORPHONE HYDROCHLORIDE 0.5 MG: 1 INJECTION, SOLUTION INTRAMUSCULAR; INTRAVENOUS; SUBCUTANEOUS at 16:54

## 2023-11-29 RX ADMIN — PHENYLEPHRINE HYDROCHLORIDE 50 MCG: 10 INJECTION INTRAVENOUS at 23:08

## 2023-11-29 RX ADMIN — FENTANYL CITRATE 50 MCG: 50 INJECTION INTRAMUSCULAR; INTRAVENOUS at 22:07

## 2023-11-29 RX ADMIN — HYDROMORPHONE HYDROCHLORIDE 0.5 MG: 1 INJECTION, SOLUTION INTRAMUSCULAR; INTRAVENOUS; SUBCUTANEOUS at 09:44

## 2023-11-29 RX ADMIN — VANCOMYCIN HYDROCHLORIDE 1250 MG: 5 INJECTION, POWDER, LYOPHILIZED, FOR SOLUTION INTRAVENOUS at 05:46

## 2023-11-29 RX ADMIN — PHENYLEPHRINE HYDROCHLORIDE 100 MCG: 10 INJECTION INTRAVENOUS at 23:16

## 2023-11-29 RX ADMIN — SODIUM CHLORIDE: 9 INJECTION, SOLUTION INTRAVENOUS at 04:01

## 2023-11-29 RX ADMIN — DEXMEDETOMIDINE HYDROCHLORIDE 4 MCG: 100 INJECTION, SOLUTION INTRAVENOUS at 22:48

## 2023-11-29 RX ADMIN — PIPERACILLIN AND TAZOBACTAM 3.38 G: 3; .375 INJECTION, POWDER, FOR SOLUTION INTRAVENOUS at 07:53

## 2023-11-29 RX ADMIN — PHENYLEPHRINE HYDROCHLORIDE 100 MCG: 10 INJECTION INTRAVENOUS at 23:04

## 2023-11-29 RX ADMIN — ROCURONIUM BROMIDE 30 MG: 50 INJECTION, SOLUTION INTRAVENOUS at 22:21

## 2023-11-29 RX ADMIN — ESMOLOL HYDROCHLORIDE 30 MG: 10 INJECTION, SOLUTION INTRAVENOUS at 23:55

## 2023-11-29 RX ADMIN — DEXMEDETOMIDINE HYDROCHLORIDE 4 MCG: 100 INJECTION, SOLUTION INTRAVENOUS at 22:35

## 2023-11-29 RX ADMIN — SUGAMMADEX 200 MG: 100 INJECTION, SOLUTION INTRAVENOUS at 23:45

## 2023-11-29 RX ADMIN — DEXMEDETOMIDINE HYDROCHLORIDE 8 MCG: 100 INJECTION, SOLUTION INTRAVENOUS at 22:55

## 2023-11-29 RX ADMIN — SENNOSIDES AND DOCUSATE SODIUM 1 TABLET: 8.6; 5 TABLET ORAL at 07:59

## 2023-11-29 RX ADMIN — DEXAMETHASONE SODIUM PHOSPHATE 10 MG: 10 INJECTION, SOLUTION INTRAMUSCULAR; INTRAVENOUS at 22:34

## 2023-11-29 RX ADMIN — PIPERACILLIN AND TAZOBACTAM 3.38 G: 3; .375 INJECTION, POWDER, FOR SOLUTION INTRAVENOUS at 16:37

## 2023-11-29 RX ADMIN — HYDROMORPHONE HYDROCHLORIDE 0.5 MG: 1 INJECTION, SOLUTION INTRAMUSCULAR; INTRAVENOUS; SUBCUTANEOUS at 05:42

## 2023-11-29 RX ADMIN — SODIUM CHLORIDE: 9 INJECTION, SOLUTION INTRAVENOUS at 22:35

## 2023-11-29 RX ADMIN — PROPOFOL 200 MG: 10 INJECTION, EMULSION INTRAVENOUS at 22:10

## 2023-11-29 RX ADMIN — FENTANYL CITRATE 50 MCG: 50 INJECTION INTRAMUSCULAR; INTRAVENOUS at 22:06

## 2023-11-29 RX ADMIN — ACETAMINOPHEN 650 MG: 325 TABLET ORAL at 07:59

## 2023-11-29 RX ADMIN — HYDROMORPHONE HYDROCHLORIDE 0.5 MG: 1 INJECTION, SOLUTION INTRAMUSCULAR; INTRAVENOUS; SUBCUTANEOUS at 14:04

## 2023-11-29 RX ADMIN — ACETAMINOPHEN 650 MG: 325 TABLET ORAL at 12:29

## 2023-11-29 RX ADMIN — SODIUM CHLORIDE, POTASSIUM CHLORIDE, SODIUM LACTATE AND CALCIUM CHLORIDE: 600; 310; 30; 20 INJECTION, SOLUTION INTRAVENOUS at 21:22

## 2023-11-29 RX ADMIN — HYDROMORPHONE HYDROCHLORIDE 0.5 MG: 1 INJECTION, SOLUTION INTRAMUSCULAR; INTRAVENOUS; SUBCUTANEOUS at 01:39

## 2023-11-29 RX ADMIN — HYDROMORPHONE HYDROCHLORIDE 1 MG: 1 INJECTION, SOLUTION INTRAMUSCULAR; INTRAVENOUS; SUBCUTANEOUS at 23:44

## 2023-11-29 RX ADMIN — ONDANSETRON 4 MG: 2 INJECTION INTRAMUSCULAR; INTRAVENOUS at 22:39

## 2023-11-29 RX ADMIN — ESMOLOL HYDROCHLORIDE 30 MG: 10 INJECTION, SOLUTION INTRAVENOUS at 23:57

## 2023-11-29 RX ADMIN — FENTANYL CITRATE 25 MCG: 50 INJECTION, SOLUTION INTRAMUSCULAR; INTRAVENOUS at 21:23

## 2023-11-29 RX ADMIN — MIDAZOLAM 2 MG: 1 INJECTION INTRAMUSCULAR; INTRAVENOUS at 22:04

## 2023-11-29 RX ADMIN — DEXMEDETOMIDINE HYDROCHLORIDE 4 MCG: 100 INJECTION, SOLUTION INTRAVENOUS at 22:38

## 2023-11-29 RX ADMIN — PHENYLEPHRINE HYDROCHLORIDE 150 MCG: 10 INJECTION INTRAVENOUS at 22:16

## 2023-11-29 RX ADMIN — LIDOCAINE HYDROCHLORIDE 5 ML: 10 INJECTION, SOLUTION INFILTRATION; PERINEURAL at 22:10

## 2023-11-29 ASSESSMENT — ACTIVITIES OF DAILY LIVING (ADL)
ADLS_ACUITY_SCORE: 43
ADLS_ACUITY_SCORE: 41
ADLS_ACUITY_SCORE: 43
ADLS_ACUITY_SCORE: 41

## 2023-11-29 ASSESSMENT — ENCOUNTER SYMPTOMS: DYSRHYTHMIAS: 0

## 2023-11-29 NOTE — PLAN OF CARE
Problem: Pain Acute  Goal: Optimal Pain Control and Function  Outcome: Progressing   Goal Outcome Evaluation:                      Pt arrived from OR around 1700. Pt oriented to self only. Pulling at catheter and IV.   Shaking and mouth chattering. Attempted to call PACU in regards to pt recovery. No return call.  LS diminished. BS active. Pt abd grossly rounded and distended. Reports discomfort.  CBI patent. Urine watermelon but clearing. Titrating.     MD notified of LACTIC 4.6  Bolus started.

## 2023-11-29 NOTE — ANESTHESIA POSTPROCEDURE EVALUATION
Patient: Rere Pwar    Procedure: Procedure(s):  CYSTOSCOPY, WITH FULGURATION OF HEMORRHAGING BLOOD VESSEL AND THROMBUS REMOVAL       Anesthesia Type:  MAC    Note:  Disposition: Inpatient   Postop Pain Control: Uneventful            Sign Out: Well controlled pain   PONV: No   Neuro/Psych: Uneventful            Sign Out: Acceptable/Baseline neuro status   Airway/Respiratory: Uneventful            Sign Out: Acceptable/Baseline resp. status   CV/Hemodynamics: Uneventful            Sign Out: Acceptable CV status; No obvious hypovolemia; No obvious fluid overload   Other NRE: NONE   DID A NON-ROUTINE EVENT OCCUR?     Event details/Postop Comments:  Per chart review           Last vitals:  Vitals:    11/28/23 2325 11/29/23 0423 11/29/23 0729   BP: (!) 153/70 (!) 154/80 (!) 152/86   Pulse: (!) 137 112 109   Resp: 24 24 22   Temp: 36.4  C (97.5  F) 36.5  C (97.7  F) 36.6  C (97.8  F)   SpO2: 93% 93% 96%       Electronically Signed By: Ken Hughes MD  November 29, 2023  8:15 AM

## 2023-11-29 NOTE — PLAN OF CARE
Problem: Pain Acute  Goal: Optimal Pain Control and Function  Outcome: Progressing  Intervention: Develop Pain Management Plan  Recent Flowsheet Documentation  Taken 11/28/2023 2151 by Rob Doss RN  Pain Management Interventions: rest  Taken 11/28/2023 2120 by Rob Doss RN  Pain Management Interventions:   medication (see MAR)   rest  Intervention: Prevent or Manage Pain  Recent Flowsheet Documentation  Taken 11/29/2023 0315 by Rob Doss RN  Medication Review/Management: medications reviewed  Taken 11/28/2023 2300 by Rob Doss RN  Medication Review/Management: medications reviewed  Taken 11/28/2023 1930 by Rob Doss RN  Medication Review/Management: medications reviewed     Problem: Urinary Retention  Goal: Effective Urinary Elimination  Outcome: Progressing   Goal Outcome Evaluation:  Alert and confused pt. Simona speaking. Complained of pain in his penis, some relief with PRN dilaudid. An extra one time dose of dilaudid ordered and given in the evening. CBI light pink. Placed on telemetry for tachycardia. Most recent lactic 1.5. NS running at 100 mL/hr.      Rash

## 2023-11-29 NOTE — PROGRESS NOTES
"  [facility] - POSTOP PROGRESS NOTE    PLACE OF SERVICE:  [facility]     SURGERY: POD #1 after Cystoscopy with clot evacuation and fulguration and complex fair catheter insertion by  Archie  for clot urinary retention.      SUBJECTIVE:   Events: Pt on 1:1 care overnight d/t agitation. RN reports he has been calm this AM. Urine clear yellow with CBI at slow rate.     Pt sleeping, did not awaken with gentle exam.     OBJECTIVE:  PHYSICAL EXAM  Vital signs:  Temp: 99.4  F (37.4  C) Temp src: Axillary BP: (!) 148/77 Pulse: 103   Resp: 20 SpO2: 96 % O2 Device: None (Room air)   Height: 156.2 cm (5' 1.5\") Weight: 65.4 kg (144 lb 1.6 oz)  Estimated body mass index is 26.79 kg/m  as calculated from the following:    Height as of this encounter: 1.562 m (5' 1.5\").    Weight as of this encounter: 65.4 kg (144 lb 1.6 oz).    General: Pt sleeping.   Abdomen: Mildly distended, no rebound or peritoneal signs, no obvious tenderness, no obvious bilateral CVA tenderness.   :  Penis and scrotum without erythema or edema. 22 Fr 3 way Fair draining clear yellow urine with CBI at slow rate. CBI stopped at 1110.     LABS:  INR   Date Value Ref Range Status   11/28/2023 1.05 0.85 - 1.15 Final      Lab Results   Component Value Date    WBC 17.1 11/29/2023     Lab Results   Component Value Date    HGB 9.2 11/29/2023    HGB 17.3 06/08/2016     Lab Results   Component Value Date     11/29/2023     Creatinine   Date Value Ref Range Status   11/29/2023 2.55 (H) 0.67 - 1.17 mg/dL Final   07/26/2016 0.8 0.7 - 1.3 mg/dL Final     Sodium   Date Value Ref Range Status   11/29/2023 142 135 - 145 mmol/L Final     Comment:     Reference intervals for this test were updated on 09/26/2023 to more accurately reflect our healthy population. There may be differences in the flagging of prior results with similar values performed with this method. Interpretation of those prior results can be made in the context of the updated " "reference intervals.    07/26/2016 137.8 132.0 - 142.0 mmol/L Final     Potassium   Date Value Ref Range Status   11/29/2023 3.9 3.4 - 5.3 mmol/L Final   09/14/2021 4.0 3.5 - 5.0 mmol/L Final   07/26/2016 3.5 3.2 - 4.6 mmol/dL Final     No results found for: \"MAG\"    UA RESULTS:  Recent Labs   Lab Test 11/28/23  0859   COLOR Red*   APPEARANCE Cloudy*   URINEGLC Negative   URINEBILI Negative   URINEKETONE 5*   SG <1.005   UBLD 1.0 mg/dL*   URINEPH 5.0   PROTEIN 30*   NITRITE Negative   LEUKEST 250 Zeeshan/uL*   RBCU >182*   WBCU 66*     Urine Culture 11/28: no growth    Blood cultures x 2 on 11/28: NGTD      Lab Results: personally reviewed.     ASSESSMENT/PLAN:  POD #1 after Cystoscopy with clot evacuation and fulguration and complex fair catheter insertion by Archie for clot urinary retention.     - S/p Cystoscopy with clot evacuation and fulguration and complex fair catheter insertion by Archie for clot urinary retention 11/28. Prostate noted 4 cm long, trilobar, markedly obstructing and hypervascular.   - Urine clear yellow this AM with CBI at slow rate. CBI off at 1110. If urine remains translucent watermelon color or lighter, cap irrigation port in 2 hours. Manually irrigate PRN.   - Hgb 9.2 today, 14.2 on 11/28. Monitor.   - Creatinine rising, 2.55 today, 1.42 on 11/28/23. Avoid nephrotoxins. Fluids per primary team. Will check renal US to evaluate for hydro.   - WBC 17.1 today, 18.5 on 11/28. UC: no growth. Bcs pending. Continue broad spectrum antibiotic, adjust as needed pending final culture/sensitivities.   - Maintain fair catheter at hospital discharge and until follow up with Dr. Almanza in 2-3 weeks. Email sent to [facility] schedulers to arrange. [facility] contact info and fair discharge instructions placed in discharge orders.   - Will continue to follow.     Updated :  Dr. Archie Dominguez, APRN, CNP  [facility]   [phone]      ADDENDUM, 1715:     IMAGING:    EXAM: US RENAL " COMPLETE NON-VASCULAR  LOCATION: [facility]  DATE: 11/29/2023     INDICATION: Worsning creatinine. Recent GH s p cysto clot evac 11 28. Eval for hydro.  COMPARISON: CT 11/27/2023  TECHNIQUE: Routine Bilateral Renal and Bladder Ultrasound.     FINDINGS:     RIGHT KIDNEY: 8.6 x 5.6 x 5.4 cm. Normal without hydronephrosis or masses.      LEFT KIDNEY: 11.1 x 6.1 x 5.3 cm. Mild left hydronephrosis. 1.8 cm simple cyst which does not require further evaluation.      BLADDER: It is distended. The Fair catheter is not seen within the bladder but appears to be in the prostatic urethra.                                                     IMPRESSION:  1.  Bladder distention with probable malpositioned Fair.  2.  Mild left hydronephrosis.  3.  Report called to nurse Caisllas at 3:40 PM.    A/P:   Bladder scanned bedside for 500 ml. Bladder distention and possible malpositioned fair with mild left hydro by renal US today. 22 Fr 3 way fair draining translucent watermelon colored urine, 400 ml output on day shift. Manually irrigated catheter, few tiny clots aspirated as well as translucent watermelon colored urine, but with some resistance. Fair removed and 18 Fr coude tip catheter easily placed (hub is at meatus) and manually irrigated with easy aspiration of translucent watermelon colored urine without clots. Some urine drainage after, but < 100 ml.   Non-contrast CT Abd/Pelvis ordered to further evaluate.     Penis and scrotum edematous as are bilateral thighs.  Scrotum also ecchymotic (R>L) and tender to palpation without erythema, excessive warmth, crepitus, necrotic lesions. Penile edema improving after manual retraction and replacement of foreskin over glans penis.   D/t tenderness, edema and ecchymosis of scrotum, will obtain scrotal/testicular US today.     Above discussed with Dr. Almanza who agrees with plan.     Handy Dominguez, APRN, CNP

## 2023-11-29 NOTE — PLAN OF CARE
Problem: Pain Acute  Goal: Optimal Pain Control and Function  Outcome: Progressing     Problem: Urinary Retention  Goal: Effective Urinary Elimination  Outcome: Progressing   Goal Outcome Evaluation:       Simona speaking patient alert but disoriented to time, place, and situation. Endorses pain at surgical site, PRN Tylenol and IV dilaudid effective. NS infusing at 125 ml/hr. On IV vanco and zosyn. CBI currently stopped by urology. Chase output stopped after CBI was stopped so writer performed manual irrigation with small pink UOP thereafter. ST on tele. Hypertensive in the 140-150s systolic. Afebrile. Pt currently down in US for renal US.

## 2023-11-29 NOTE — PROGRESS NOTES
[facility]    Hospitalist Progress Note    Assessment & Plan   68 year old Simona Speaking male who was admitted on 11/27/2023 difficulty voiding and gross hematuria.      Impression:   Principal Problem:    Severe sepsis (H)    -- aggressive IV hydration and IV Zosyn and Vanco       Lactic acidosis -- suspect related to sepsis   -- Lactate has slowly climbed to 4.6, although clinically appears improving, will continue fluids and repeat BMP in AM       Anemia, acute blood loss      Urinary retention -- suspect 2nd to clots   -- had cystoscopy with clot removal this afternoon      Joseluis hematuria -- suspected to be from prostate vessels, no bladder lesions seen      MAUREEN (acute kidney injury) (H24) -- suspect from sepsis and transient hypotension   -- continue IV fluids and treatment of sepsis   -- hypotension resolved with fluids      Plan:  as above, discussed with Urology    DVT Prophylaxis: Pneumatic Compression Devices  Code Status: Full Code    Disposition: Expected discharge in 2-3 days    Toby James MD  Pager [phone]  Cell Phone [phone]  Text Page (7am to 6pm)  (50 min total)    Interval History   Appeared comfortable this AM, denied back pain.  Chase with slight tinge of red (much improved from yesterday)     Physical Exam   Temp: 97.8  F (36.6  C) Temp src: Oral BP: (!) 152/86 Pulse: 109   Resp: 22 SpO2: 96 % O2 Device: None (Room air)    Vitals:    11/28/23 0615   Weight: 65.4 kg (144 lb 1.6 oz)     Vital Signs with Ranges  Temp:  [97.5  F (36.4  C)-98.9  F (37.2  C)] 97.8  F (36.6  C)  Pulse:  [102-137] 109  Resp:  [14-35] 22  BP: ()/(54-86) 152/86  SpO2:  [92 %-98 %] 96 %  I/O last 3 completed shifts:  In: 800 [I.V.:800]  Out: -2900     # Pain Assessment:      11/29/2023     7:29 AM   Current Pain Score   Patient currently in pain? yes   Pah s pain level was assessed and he currently denies pain.        Constitutional: Awake, alert, cooperative, no  apparent distress  Respiratory: Clear to auscultation bilaterally, no crackles or wheezing  Cardiovascular: Regular rate and rhythm, normal S1 and S2, and no murmur noted  GI: Normal bowel sounds, soft, non-distended, non-tender  Extrem: No calf tenderness, no ankle edema  Neuro: Appeared appropriate this AM, no focal motor or sensory deficits    Medications    sodium chloride 100 mL/hr at 11/29/23 0401      piperacillin-tazobactam  3.375 g Intravenous Q8H    sodium chloride (PF)  3 mL Intracatheter Q8H    vancomycin  1,250 mg Intravenous Q24H       Data   Recent Labs   Lab 11/29/23  0719 11/28/23  2107 11/28/23  0636 11/28/23  0011 11/27/23  1830   WBC 17.1*  --  18.5*  --  11.8*   HGB 9.2*  --  14.2  --  17.1   MCV 94  --  92  --  88     --  332  --  292   INR  --   --  1.05 1.01  --     141 136  --  136   POTASSIUM 3.9 4.3 4.3  --  3.8   CHLORIDE 111* 109* 101  --  100   CO2 21* 20* 23  --  26   BUN 20.9 16.8 14.1  --  12.5   CR 2.55* 1.42* 1.27*  --  0.95   ANIONGAP 10 12 12  --  10   KETAN 7.9* 8.0* 9.4  --  10.0   * 152* 175*  --  134*   ALBUMIN  --   --   --   --  4.6   PROTTOTAL  --   --   --   --  8.1   BILITOTAL  --   --   --   --  0.7   ALKPHOS  --   --   --   --  100   ALT  --   --   --   --  48   AST  --   --   --   --  41       Imaging:   No results found for this or any previous visit (from the past 24 hour(s)).

## 2023-11-30 ENCOUNTER — APPOINTMENT (OUTPATIENT)
Dept: RADIOLOGY | Facility: HOSPITAL | Age: 68
DRG: 717 | End: 2023-11-30
Attending: ANESTHESIOLOGY
Payer: COMMERCIAL

## 2023-11-30 ENCOUNTER — APPOINTMENT (OUTPATIENT)
Dept: ULTRASOUND IMAGING | Facility: HOSPITAL | Age: 68
DRG: 717 | End: 2023-11-30
Attending: NURSE PRACTITIONER
Payer: COMMERCIAL

## 2023-11-30 LAB
ANION GAP SERPL CALCULATED.3IONS-SCNC: 11 MMOL/L (ref 7–15)
BUN SERPL-MCNC: 28.8 MG/DL (ref 8–23)
CALCIUM SERPL-MCNC: 8.1 MG/DL (ref 8.8–10.2)
CHLORIDE SERPL-SCNC: 110 MMOL/L (ref 98–107)
CREAT SERPL-MCNC: 3.99 MG/DL (ref 0.67–1.17)
DEPRECATED HCO3 PLAS-SCNC: 21 MMOL/L (ref 22–29)
EGFRCR SERPLBLD CKD-EPI 2021: 16 ML/MIN/1.73M2
ERYTHROCYTE [DISTWIDTH] IN BLOOD BY AUTOMATED COUNT: 14.5 % (ref 10–15)
GLUCOSE SERPL-MCNC: 162 MG/DL (ref 70–99)
HCT VFR BLD AUTO: 23.1 % (ref 40–53)
HGB BLD-MCNC: 7.5 G/DL (ref 13.3–17.7)
HGB BLD-MCNC: 8.3 G/DL (ref 13.3–17.7)
MCH RBC QN AUTO: 30.5 PG (ref 26.5–33)
MCHC RBC AUTO-ENTMCNC: 32.5 G/DL (ref 31.5–36.5)
MCV RBC AUTO: 94 FL (ref 78–100)
PLATELET # BLD AUTO: 161 10E3/UL (ref 150–450)
POTASSIUM SERPL-SCNC: 4.5 MMOL/L (ref 3.4–5.3)
RBC # BLD AUTO: 2.46 10E6/UL (ref 4.4–5.9)
SODIUM SERPL-SCNC: 142 MMOL/L (ref 135–145)
WBC # BLD AUTO: 14.2 10E3/UL (ref 4–11)

## 2023-11-30 PROCEDURE — 250N000013 HC RX MED GY IP 250 OP 250 PS 637: Performed by: UROLOGY

## 2023-11-30 PROCEDURE — 99222 1ST HOSP IP/OBS MODERATE 55: CPT | Performed by: INTERNAL MEDICINE

## 2023-11-30 PROCEDURE — 250N000009 HC RX 250: Performed by: ANESTHESIOLOGY

## 2023-11-30 PROCEDURE — 36415 COLL VENOUS BLD VENIPUNCTURE: CPT | Performed by: INTERNAL MEDICINE

## 2023-11-30 PROCEDURE — 120N000001 HC R&B MED SURG/OB

## 2023-11-30 PROCEDURE — 36415 COLL VENOUS BLD VENIPUNCTURE: CPT | Performed by: UROLOGY

## 2023-11-30 PROCEDURE — 76770 US EXAM ABDO BACK WALL COMP: CPT

## 2023-11-30 PROCEDURE — 250N000011 HC RX IP 250 OP 636: Mod: JZ | Performed by: INTERNAL MEDICINE

## 2023-11-30 PROCEDURE — 82374 ASSAY BLOOD CARBON DIOXIDE: CPT | Performed by: UROLOGY

## 2023-11-30 PROCEDURE — 250N000009 HC RX 250: Performed by: NURSE ANESTHETIST, CERTIFIED REGISTERED

## 2023-11-30 PROCEDURE — 250N000011 HC RX IP 250 OP 636: Performed by: UROLOGY

## 2023-11-30 PROCEDURE — 85014 HEMATOCRIT: CPT | Performed by: UROLOGY

## 2023-11-30 PROCEDURE — 85018 HEMOGLOBIN: CPT | Performed by: INTERNAL MEDICINE

## 2023-11-30 PROCEDURE — 250N000011 HC RX IP 250 OP 636: Performed by: NURSE ANESTHETIST, CERTIFIED REGISTERED

## 2023-11-30 PROCEDURE — 71045 X-RAY EXAM CHEST 1 VIEW: CPT

## 2023-11-30 PROCEDURE — 258N000003 HC RX IP 258 OP 636: Performed by: UROLOGY

## 2023-11-30 PROCEDURE — 5A09357 ASSISTANCE WITH RESPIRATORY VENTILATION, LESS THAN 24 CONSECUTIVE HOURS, CONTINUOUS POSITIVE AIRWAY PRESSURE: ICD-10-PCS | Performed by: UROLOGY

## 2023-11-30 PROCEDURE — 99233 SBSQ HOSP IP/OBS HIGH 50: CPT | Performed by: INTERNAL MEDICINE

## 2023-11-30 PROCEDURE — 94640 AIRWAY INHALATION TREATMENT: CPT

## 2023-11-30 PROCEDURE — 999N000157 HC STATISTIC RCP TIME EA 10 MIN

## 2023-11-30 PROCEDURE — 250N000013 HC RX MED GY IP 250 OP 250 PS 637: Performed by: INTERNAL MEDICINE

## 2023-11-30 PROCEDURE — 94660 CPAP INITIATION&MGMT: CPT

## 2023-11-30 PROCEDURE — 250N000009 HC RX 250

## 2023-11-30 RX ORDER — ALBUTEROL SULFATE 0.83 MG/ML
SOLUTION RESPIRATORY (INHALATION)
Status: COMPLETED
Start: 2023-11-30 | End: 2023-11-30

## 2023-11-30 RX ORDER — FUROSEMIDE 10 MG/ML
60 INJECTION INTRAMUSCULAR; INTRAVENOUS EVERY 8 HOURS
Status: DISCONTINUED | OUTPATIENT
Start: 2023-11-30 | End: 2023-12-01

## 2023-11-30 RX ORDER — DEXMEDETOMIDINE HYDROCHLORIDE 4 UG/ML
INJECTION, SOLUTION INTRAVENOUS CONTINUOUS PRN
Status: DISCONTINUED | OUTPATIENT
Start: 2023-11-30 | End: 2023-11-30

## 2023-11-30 RX ORDER — DEXMEDETOMIDINE HYDROCHLORIDE 4 UG/ML
INJECTION, SOLUTION INTRAVENOUS
Status: COMPLETED
Start: 2023-11-30 | End: 2023-11-30

## 2023-11-30 RX ORDER — FUROSEMIDE 10 MG/ML
60 INJECTION INTRAMUSCULAR; INTRAVENOUS ONCE
Status: COMPLETED | OUTPATIENT
Start: 2023-11-30 | End: 2023-11-30

## 2023-11-30 RX ORDER — FUROSEMIDE 10 MG/ML
INJECTION INTRAMUSCULAR; INTRAVENOUS PRN
Status: DISCONTINUED | OUTPATIENT
Start: 2023-11-30 | End: 2023-11-30

## 2023-11-30 RX ORDER — HYDROMORPHONE HYDROCHLORIDE 1 MG/ML
0.5 INJECTION, SOLUTION INTRAMUSCULAR; INTRAVENOUS; SUBCUTANEOUS
Status: DISCONTINUED | OUTPATIENT
Start: 2023-11-30 | End: 2023-12-05 | Stop reason: HOSPADM

## 2023-11-30 RX ORDER — PIPERACILLIN SODIUM, TAZOBACTAM SODIUM 3; .375 G/15ML; G/15ML
3.38 INJECTION, POWDER, LYOPHILIZED, FOR SOLUTION INTRAVENOUS EVERY 12 HOURS
Status: DISCONTINUED | OUTPATIENT
Start: 2023-11-30 | End: 2023-12-03

## 2023-11-30 RX ORDER — GABAPENTIN 100 MG/1
100 CAPSULE ORAL AT BEDTIME
Status: DISCONTINUED | OUTPATIENT
Start: 2023-11-30 | End: 2023-12-05 | Stop reason: HOSPADM

## 2023-11-30 RX ORDER — ESMOLOL HYDROCHLORIDE 10 MG/ML
INJECTION INTRAVENOUS PRN
Status: DISCONTINUED | OUTPATIENT
Start: 2023-11-29 | End: 2023-11-30

## 2023-11-30 RX ADMIN — PANTOPRAZOLE SODIUM 40 MG: 40 TABLET, DELAYED RELEASE ORAL at 09:31

## 2023-11-30 RX ADMIN — PIPERACILLIN AND TAZOBACTAM 3.38 G: 3; .375 INJECTION, POWDER, FOR SOLUTION INTRAVENOUS at 09:30

## 2023-11-30 RX ADMIN — PIPERACILLIN AND TAZOBACTAM 3.38 G: 3; .375 INJECTION, POWDER, FOR SOLUTION INTRAVENOUS at 20:45

## 2023-11-30 RX ADMIN — LEVOTHYROXINE SODIUM 25 MCG: 0.03 TABLET ORAL at 09:31

## 2023-11-30 RX ADMIN — ARIPIPRAZOLE 5 MG: 5 TABLET ORAL at 09:31

## 2023-11-30 RX ADMIN — SODIUM CHLORIDE: 9 INJECTION, SOLUTION INTRAVENOUS at 02:37

## 2023-11-30 RX ADMIN — ALBUTEROL SULFATE 2.5 MG: 2.5 SOLUTION RESPIRATORY (INHALATION) at 00:20

## 2023-11-30 RX ADMIN — VANCOMYCIN HYDROCHLORIDE 1250 MG: 5 INJECTION, POWDER, LYOPHILIZED, FOR SOLUTION INTRAVENOUS at 06:37

## 2023-11-30 RX ADMIN — DEXMEDETOMIDINE HYDROCHLORIDE 0.3 MCG/KG/HR: 400 INJECTION INTRAVENOUS at 00:08

## 2023-11-30 RX ADMIN — FLUOXETINE 20 MG: 20 CAPSULE ORAL at 09:31

## 2023-11-30 RX ADMIN — TRAZODONE HYDROCHLORIDE 50 MG: 50 TABLET ORAL at 21:56

## 2023-11-30 RX ADMIN — DEXMEDETOMIDINE HYDROCHLORIDE 12 MCG: 100 INJECTION, SOLUTION INTRAVENOUS at 00:06

## 2023-11-30 RX ADMIN — ESMOLOL HYDROCHLORIDE 10 MG: 10 INJECTION, SOLUTION INTRAVENOUS at 00:15

## 2023-11-30 RX ADMIN — FUROSEMIDE 60 MG: 10 INJECTION, SOLUTION INTRAMUSCULAR; INTRAVENOUS at 14:23

## 2023-11-30 RX ADMIN — FUROSEMIDE 60 MG: 10 INJECTION, SOLUTION INTRAMUSCULAR; INTRAVENOUS at 21:58

## 2023-11-30 RX ADMIN — SIMVASTATIN 10 MG: 10 TABLET, FILM COATED ORAL at 20:37

## 2023-11-30 RX ADMIN — ACETAMINOPHEN 650 MG: 325 TABLET ORAL at 20:36

## 2023-11-30 RX ADMIN — FUROSEMIDE 20 MG: 10 INJECTION, SOLUTION INTRAVENOUS at 00:17

## 2023-11-30 RX ADMIN — HYDROMORPHONE HYDROCHLORIDE 0.5 MG: 1 INJECTION, SOLUTION INTRAMUSCULAR; INTRAVENOUS; SUBCUTANEOUS at 09:57

## 2023-11-30 RX ADMIN — HYDROMORPHONE HYDROCHLORIDE 0.5 MG: 1 INJECTION, SOLUTION INTRAMUSCULAR; INTRAVENOUS; SUBCUTANEOUS at 00:48

## 2023-11-30 RX ADMIN — GABAPENTIN 100 MG: 100 CAPSULE ORAL at 20:36

## 2023-11-30 RX ADMIN — SENNOSIDES AND DOCUSATE SODIUM 1 TABLET: 8.6; 5 TABLET ORAL at 21:56

## 2023-11-30 RX ADMIN — ESMOLOL HYDROCHLORIDE 30 MG: 10 INJECTION, SOLUTION INTRAVENOUS at 00:14

## 2023-11-30 ASSESSMENT — ACTIVITIES OF DAILY LIVING (ADL)
ADLS_ACUITY_SCORE: 43
ADLS_ACUITY_SCORE: 43
ADLS_ACUITY_SCORE: 32
ADLS_ACUITY_SCORE: 33
ADLS_ACUITY_SCORE: 43
ADLS_ACUITY_SCORE: 32
ADLS_ACUITY_SCORE: 33
ADLS_ACUITY_SCORE: 32
ADLS_ACUITY_SCORE: 43
ADLS_ACUITY_SCORE: 44
ADLS_ACUITY_SCORE: 32
ADLS_ACUITY_SCORE: 32

## 2023-11-30 NOTE — ANESTHESIA CARE TRANSFER NOTE
Patient: Rere Pwar    Procedure: Procedure(s):  CYSTOSCOPY, OPEN SUPRAPUBIC TUBE PLACEMENT       Diagnosis: Urinary retention [R33.9]  Diagnosis Additional Information: No value filed.    Anesthesia Type:   General     Note:    Oropharynx: oropharynx clear of all foreign objects  Level of Consciousness: awake  Oxygen Supplementation: face mask  Level of Supplemental Oxygen (L/min / FiO2): 6  Independent Airway: airway patency satisfactory and stable  Dentition: dentition unchanged  Vital Signs Stable: post-procedure vital signs reviewed and stable    Patient transferred to: PACU    Handoff Report: Identifed the Patient, Identified the Reponsible Provider, Reviewed the pertinent medical history, Discussed the surgical course, Reviewed Intra-OP anesthesia mangement and issues during anesthesia, Set expectations for post-procedure period and Allowed opportunity for questions and acknowledgement of understanding      Vitals:  Vitals Value Taken Time   /71 11/30/23 0015   Temp 36.8  C (98.2  F) 11/29/23 2355   Pulse 123 11/30/23 0016   Resp 24 11/30/23 0016   SpO2 100 % 11/30/23 0016   Vitals shown include unfiled device data.    Electronically Signed By: MICHAELLE Baker CRNA  November 30, 2023  12:18 AM
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
## 2023-11-30 NOTE — ANESTHESIA PROCEDURE NOTES
Airway       Patient location during procedure: OR       Procedure Start/Stop Times: 11/29/2023 10:13 PM  Staff -        Anesthesiologist:  Laxmi Brewer MD       CRNA: Maria R Pendleton APRN CRNA       Performed By: CRNA  Consent for Airway        Urgency: emergent  Indications and Patient Condition       Indications for airway management: elva-procedural       Induction type:RSI       Mask difficulty assessment: 0 - not attempted    Final Airway Details       Final airway type: endotracheal airway       Successful airway: ETT - single  Endotracheal Airway Details        ETT size (mm): 7.0       Cuffed: yes       Successful intubation technique: video laryngoscopy       VL Blade Size: Glidescope 4       Grade View of Cords: 1       Adjucts: stylet       Position: Right       Measured from: lips       Secured at (cm): 22    Post intubation assessment        Placement verified by: capnometry, equal breath sounds and chest rise        Number of attempts at approach: 1       Secured with: tape       Ease of procedure: easy       Dentition: Intact and Unchanged    Medication(s) Administered   Medication Administration Time: 11/29/2023 10:13 PM

## 2023-11-30 NOTE — PROGRESS NOTES
Care Management Follow Up    Spoke with patient's cousin Bernardo who is only ER contact. Wilfredow states he has not been in contact with patient for a long time.  He said patient lives with his PCA but does not know PCA name or contact information.  Appears some granddaughters have visited.  Requesting nursing staff obtain family contact information.    Unable to complete assessment. Full assessment needed.    Barbara Young RN
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
## 2023-11-30 NOTE — PROGRESS NOTES
"  [facility] - POSTOP PROGRESS NOTE    PLACE OF SERVICE:  [facility]     SURGERY:   POD #2 after Cystoscopy with clot evacuation and fulguration and complex fair catheter insertion by  Archie  for clot urinary retention.    POD#1 after Cystoscopy and attempt at Fair catheter replacement with open suprapubic tube placement, for malpositioned fair posterior to bladder and urinary retention with failure to establish tract through the urethra    SUBJECTIVE:   Events: Remains on 1:1 care. Urine yellow from SP catheter. Small amount of serosanguinous drainage from MILAGROS. Pt denies abdominal and bilateral flank pain. C/o scrotal pain. Scrotum/penis edematous.     GeckoGo language line  used during entire visit.     OBJECTIVE:  PHYSICAL EXAM  Vital signs:  Temp: 98.2  F (36.8  C) Temp src: Oral BP: 111/64 Pulse: 93   Resp: 20 SpO2: 98 % O2 Device: Nasal cannula with humidification Oxygen Delivery: 1 LPM Height: 156.2 cm (5' 1.5\") Weight: 65.4 kg (144 lb 1.6 oz)  Estimated body mass index is 26.79 kg/m  as calculated from the following:    Height as of this encounter: 1.562 m (5' 1.5\").    Weight as of this encounter: 65.4 kg (144 lb 1.6 oz).    General: In no acute distress.   Abdomen: Moderately distended, no rebound or peritoneal signs, no tenderness, no bilateral CVA tenderness. MILAGROS and SP exit site dressings intact. MILAGROS output 25 ml since placement, thin sanguinous.   : Penis, scrotum and bilateral thighs edematous, without erythema/excessive warmth/crepitus/necrotic lesions. Small amount of blood draining from urethral meatus. R>L scrotal ecchymosis and perineum mildly ecchymotic. Rolled towel under scrotum.     LABS:  INR   Date Value Ref Range Status   11/28/2023 1.05 0.85 - 1.15 Final      Lab Results   Component Value Date    WBC 14.2 11/30/2023     Lab Results   Component Value Date    RBC 2.46 11/30/2023     Lab Results   Component Value Date    HGB 7.5 11/30/2023    HGB 17.3 06/08/2016 " "    Lab Results   Component Value Date    HCT 23.1 11/30/2023    HCT 51.0 06/08/2016     Lab Results   Component Value Date    MCV 94 11/30/2023    .7 06/08/2016     Lab Results   Component Value Date    MCH 30.5 11/30/2023    MCH 34.2 06/08/2016     Lab Results   Component Value Date    MCHC 32.5 11/30/2023    MCHC 33.9 06/08/2016     Lab Results   Component Value Date    RDW 14.5 11/30/2023     Lab Results   Component Value Date     11/30/2023       Creatinine   Date Value Ref Range Status   11/30/2023 3.99 (H) 0.67 - 1.17 mg/dL Final   07/26/2016 0.8 0.7 - 1.3 mg/dL Final     Sodium   Date Value Ref Range Status   11/30/2023 142 135 - 145 mmol/L Final     Comment:     Reference intervals for this test were updated on 09/26/2023 to more accurately reflect our healthy population. There may be differences in the flagging of prior results with similar values performed with this method. Interpretation of those prior results can be made in the context of the updated reference intervals.    07/26/2016 137.8 132.0 - 142.0 mmol/L Final     Potassium   Date Value Ref Range Status   11/30/2023 4.5 3.4 - 5.3 mmol/L Final   09/14/2021 4.0 3.5 - 5.0 mmol/L Final   07/26/2016 3.5 3.2 - 4.6 mmol/dL Final     No results found for: \"MAG\"    UA RESULTS:  Recent Labs   Lab Test 11/28/23  0859   COLOR Red*   APPEARANCE Cloudy*   URINEGLC Negative   URINEBILI Negative   URINEKETONE 5*   SG <1.005   UBLD 1.0 mg/dL*   URINEPH 5.0   PROTEIN 30*   NITRITE Negative   LEUKEST 250 Zeeshan/uL*   RBCU >182*   WBCU 66*     Urine Culture 11/28: no growth    Blood cultures x 2 on 11/28: NGTD      Lab Results: personally reviewed.     ASSESSMENT/PLAN:  POD#1 after Cystoscopy and attempt at Fair catheter replacement with open suprapubic tube placement, for malpositioned fair posterior to bladder and urinary retention with failure to establish tract through the urethra  POD #2 after Cystoscopy with clot evacuation and fulguration and " complex fair catheter insertion by Archie for clot urinary retention.     - Prostate noted 4 cm long, trilobar, markedly obstructing and hypervascular on cysto 11/28.   - Urine clear from SP cath this AM. Monitor.   - Abdominal pain improved/resolved.   - Hgb 7.5 today, 9.2 on 11/29. Transfusions prn per primary team. Monitor.   - Creatinine rising, 3.99 today, 1.42 on 11/28/23. Left hydro in setting of bladder distention prior to SP catheter placement yesterday. Will repeat renal US today. Nephrology consult pending today, appreciate recs.   - WBC improving, 14.2 today. UC: no growth. BC x 2: NGTD. Continue broad spectrum antibiotic, adjust as needed pending final culture/sensitivities.   - MILAGROS output 25 ml since surgery. Monitor.   - Email sent to [facility] schedulers to arrange follow up with Dr. Almanza in 2-3 weeks. [facility] contact info and fair discharge instructions placed in discharge orders.   - Will continue to follow.     Updated :  Dr. Almanza  and Dr. Ladarius Dominguez, APRN, CNP  [facility]   [phone]

## 2023-11-30 NOTE — PROGRESS NOTES
Pt given alb neb X 2.  BS bilateral exp wheezes, audible upper airway wheezes.  RR 30's, 'S.  Placed pt on Bipap due to increased WOB.  Bipap 1212/6, rate 16, 40% FIO2.  RT will continue to monitor.

## 2023-11-30 NOTE — PROGRESS NOTES
[facility]    Hospitalist Progress Note    Assessment & Plan   68 year old Simona Speaking male who was admitted on 11/27/2023 difficulty voiding and gross hematuria.      Impression:   Principal Problem:    Severe sepsis (H)    -- aggressive IV hydration and IV Zosyn and Vanco       Lactic acidosis -- suspect related to sepsis, better      Anemia, acute blood loss   -- hgb 7.5 today, will repeat at 3 PM and in AM      Urinary retention -- suspect 2nd to clots    S/P Suprapubic Catheter 11/29/23   -- had cystoscopy with clot removal this afternoon   -- required Suprapubic Catheter last evening (Urology unable to place fair)      Swollen Scrotum -- no obvious infection, is on Zosyn   -- will stop Vanco given MAUREEN and UC negative      Joseluis hematuria -- suspected to be from prostate vessels, no bladder lesions seen      MAUREEN (acute kidney injury) (H24) -- suspect from sepsis and transient hypotension   -- continue IV fluids and treatment of sepsis   -- hypotension resolved with fluids      Plan:  as above, discussed with Urology, will consult Nephrology as Creat continues to rese.    DVT Prophylaxis: Pneumatic Compression Devices  Code Status: Full Code    Disposition: Expected discharge in 2-3 days    Toby James MD  Pager [phone]  Cell Phone [phone]  Text Page (7am to 6pm)  (50 min total)    Interval History   Reports no pain and feels OK.      Physical Exam   Temp: 98.2  F (36.8  C) Temp src: Oral BP: 111/64 Pulse: 93   Resp: 20 SpO2: 98 % O2 Device: Nasal cannula with humidification Oxygen Delivery: 1 LPM  Vitals:    11/28/23 0615   Weight: 65.4 kg (144 lb 1.6 oz)     Vital Signs with Ranges  Temp:  [97.6  F (36.4  C)-100  F (37.8  C)] 98.2  F (36.8  C)  Pulse:  [] 93  Resp:  [18-33] 20  BP: (110-181)/(60-82) 111/64  SpO2:  [92 %-100 %] 98 %  I/O last 3 completed shifts:  In: 600 [I.V.:600]  Out: 1050 [Urine:1025; Drains:25]    # Pain Assessment:      11/30/2023      1:15 AM   Current Pain Score   Patient currently in pain? denies   Pah s pain level was assessed and he currently denies pain.        Constitutional: Awake, alert, cooperative, no apparent distress  Respiratory: Clear to auscultation bilaterally, no crafckles or wheezing  Cardiovascular: Regular rate and rhythm, normal S1 and S2, and no murmur noted  GI: Normal bowel sounds, soft, non-distended, non-tender  Scrotum and Penis both edematous, but no tenderness and no skin breakdown  Extrem: No calf tenderness, no ankle edema  Neuro: Appeared appropriate this AM, no focal motor or sensory deficits    Medications    sodium chloride 125 mL/hr at 11/30/23 0237      acetaminophen  975 mg Oral Once    ARIPiprazole  5 mg Oral Daily    FLUoxetine  20 mg Oral Daily    gabapentin  300 mg Oral At Bedtime    levothyroxine  25 mcg Oral Daily    pantoprazole  40 mg Oral QAM AC    piperacillin-tazobactam  3.375 g Intravenous Q12H    simvastatin  10 mg Oral At Bedtime    sodium chloride (PF)  3 mL Intracatheter Q8H    traZODone  50 mg Oral At Bedtime       Data   Recent Labs   Lab 11/30/23  0716 11/29/23  2234 11/29/23  0719 11/28/23  2107 11/28/23  0636 11/28/23  0011 11/27/23  1830   WBC 14.2* 13.2* 17.1*  --  18.5*  --  11.8*   HGB 7.5* 9.2* 9.2*  --  14.2  --  17.1   MCV 94 92 94  --  92  --  88    156 191  --  332  --  292   INR  --   --   --   --  1.05 1.01  --      --  142 141 136  --  136   POTASSIUM 4.5  --  3.9 4.3 4.3  --  3.8   CHLORIDE 110*  --  111* 109* 101  --  100   CO2 21*  --  21* 20* 23  --  26   BUN 28.8*  --  20.9 16.8 14.1  --  12.5   CR 3.99*  --  2.55* 1.42* 1.27*  --  0.95   ANIONGAP 11  --  10 12 12  --  10   KETAN 8.1*  --  7.9* 8.0* 9.4  --  10.0   *  --  146* 152* 175*  --  134*   ALBUMIN  --   --   --   --   --   --  4.6   PROTTOTAL  --   --   --   --   --   --  8.1   BILITOTAL  --   --   --   --   --   --  0.7   ALKPHOS  --   --   --   --   --   --  100   ALT  --   --   --   --    --   --  48   AST  --   --   --   --   --   --  41       Imaging:   Recent Results (from the past 24 hour(s))   US Renal Complete Non-Vascular    Narrative    EXAM: US RENAL COMPLETE NON-VASCULAR  LOCATION: [facility]  DATE: 11/29/2023    INDICATION: Worsning creatinine. Recent GH s p cysto clot evac 11 28. Eval for hydro.  COMPARISON: CT 11/27/2023  TECHNIQUE: Routine Bilateral Renal and Bladder Ultrasound.    FINDINGS:    RIGHT KIDNEY: 8.6 x 5.6 x 5.4 cm. Normal without hydronephrosis or masses.     LEFT KIDNEY: 11.1 x 6.1 x 5.3 cm. Mild left hydronephrosis. 1.8 cm simple cyst which does not require further evaluation.     BLADDER: It is distended. The Fair catheter is not seen within the bladder but appears to be in the prostatic urethra.      Impression    IMPRESSION:  1.  Bladder distention with probable malpositioned Fair.  2.  Mild left hydronephrosis.  3.  Report called to nurse Casillas at 3:40 PM.   CT Abdomen Pelvis w/o Contrast    Narrative    EXAM: CT ABDOMEN PELVIS W/O CONTRAST  LOCATION: [facility]  DATE: 11/29/2023    INDICATION: Renal US today showed left hydro, distended bladder, fair in prostate urethra. Fair replaced with less than expected urine return. Known very large prostate. Please evaluate for fair position, hydro, bladder distention.  COMPARISON: None.  TECHNIQUE: CT scan of the abdomen and pelvis was performed without IV contrast. Multiplanar reformats were obtained. Dose reduction techniques were used.  CONTRAST: None.    FINDINGS:   LOWER CHEST: Mild dependent atelectasis. Moderate calcification of the RCA and proximal LAD.    HEPATOBILIARY: Diffuse hepatic steatosis. No calcified gallstones.    PANCREAS: Normal.    SPLEEN: Normal.    ADRENAL GLANDS: Normal.    KIDNEYS/BLADDER: Mild bilateral hydronephrosis, new. Mild bilateral hydroureter. Urinary bladder is distended measuring 8 cm AP by 14 cm long by 9.5 cm transversely. There is a  moderate amount of gas not dependently in the urinary bladder.    A Chase catheter is present within the urethra, traverses the prostate gland, but is then seen within the right upper pelvis posteriorly with the bone terminating in the right lower retroperitoneum at the level of the pelvic brim just anterior to the   right common iliac artery. The tube courses exits the superior posterior margin of the prostate gland best seen on sagittal 71. There is a small amount of retroperitoneal as well as mesenteric gas.    BOWEL: No bowel obstruction. Normal appendix.    LYMPH NODES: Normal.    VASCULATURE: No aneurysm.    PELVIC ORGANS: Moderate prostate gland enlargement.    MUSCULOSKELETAL: Diffuse subcutaneous edema around the lower abdomen and pelvis. Old healed pelvic fractures.      Impression    IMPRESSION:   1.  Malpositioned Chase catheter terminating in the right lower retroperitoneum. Catheter appears to exit the superior posterior margin of the prostate gland. There is a small amount of free air in the lower abdomen and pelvis related to tube placement.  2.  Distended urinary bladder.  3.  Mild hydronephrosis, new.    Results were discussed with patient's nurse, Jose Kang R.N. at approximately 1835 hours on 11/29/2023.     US Testicular & Scrotum w Doppler Ltd    Narrative    EXAM: US TESTICULAR AND SCROTUM WITH DOPPLER LIMITED  LOCATION: [facility]  DATE: 11/29/2023    INDICATION: Scrotal pain and swelling  COMPARISON: CT AP 11/29/2023, 11/27/2023  TECHNIQUE: Ultrasound of scrotum with color flow and spectral Doppler with waveform analysis performed.    FINDINGS:    RIGHT: Right testicle measures 3.5 x 2.3 x 2.7 cm. Normal testicle with no masses. Normal arterial duplex and normal color flow. Normal epididymis. No hydrocele. No varicocele.    LEFT: Left testicle measures 3.3 x 2.1 x 2.4 cm. Normal testicle with no masses. Normal arterial duplex and normal color flow. Normal  epididymis. No hydrocele. No varicocele.    There is marked scrotal edema bilaterally.      Impression    IMPRESSION:  1.  Marked scrotal edema as noted on CT done earlier today.  2.  Testicles are unremarkable.  3.  Findings discussed by the undersigned with his nurse Tammy at 2124. Patient is in the OR to repair injury from misplaced Chase noted on CT done just prior to this US.   XR Surgery GENNA  Fluoro G/T 5 Min    Narrative    This exam was marked as non-reportable because it will not be read by a   radiologist or a [place] non-radiologist provider.         XR Chest Port 1 View    Narrative    EXAM: XR CHEST PORT 1 VIEW  LOCATION: [facility]  DATE: 11/30/2023    INDICATION: Dyspnea post surgery.  COMPARISON: Chest x-ray on 08/02/2023.      Impression    IMPRESSION: Single AP view of the chest was obtained. Mildly enlarged cardiac silhouette. Asymmetric elevation of the right hemidiaphragm as compared to the left. Mild basilar pulmonary opacities, likely atelectasis. No significant pleural effusion or   pneumothorax.

## 2023-11-30 NOTE — PLAN OF CARE
Problem: Adult Inpatient Plan of Care  Goal: Readiness for Transition of Care  Outcome: Progressing  Intervention: Mutually Develop Transition Plan  Recent Flowsheet Documentation  Taken 11/30/2023 1000 by Kristy Kang, RN  Equipment Currently Used at Home:   walker, rolling   cane, quad     Problem: Pain Acute  Goal: Optimal Pain Control and Function  Outcome: Progressing  Intervention: Prevent or Manage Pain  Recent Flowsheet Documentation  Taken 11/30/2023 0945 by Kristy Kang, RN  Medication Review/Management: medications reviewed     Problem: Pain Acute  Goal: Optimal Pain Control and Function  Intervention: Prevent or Manage Pain  Recent Flowsheet Documentation  Taken 11/30/2023 0945 by Kristy Kang, RN  Medication Review/Management: medications reviewed   Goal Outcome Evaluation:      Plan of Care Reviewed With: patient    Overall Patient Progress: improvingOverall Patient Progress: improving    Language line used for explanation of cares and medications.  Dressing change around SP cath and MILAGROS done twice this shift for serosanguinous drainage.  Scrotum and penis remain swollen, elevated with washcloths and washcloths.  Wheezy this afternoon, Nephrology stopped IVFs and 60mg IV lasix administered.  Denied nausea.  States intermittent scrotal pain, IV dilaudid administered this am with relief per language line, and patient denied pain medications this afternoon.

## 2023-11-30 NOTE — OP NOTE
Date of surgery: 11/29/2023  Location:[facility] OR      Surgeon: Jonathon Durand MD      Anesthesia: General    Preoperative diagnosis: History of clot urinary retention status post cystoscopy now with Chase catheter malposition posterior to the bladder counseled on cystoscopy with Chase catheter replacement.    Postoperative diagnosis: I am unable to discern or establish any track into the bladder transurethrally.  The urethra itself quite inflammatory and some necrotic appearance.  Urinary retention continues to be the diagnosis with failure of establishing tract through the urethra requiring suprapubic tube placement    Procedure: Cystoscopy and attempt at Chase catheter replacement with suprapubic tube placement open    Operative indication: Rere Robbins is a 68 year old male prior cystoscopy for clot evacuation but imaging today after poor drainage Chase catheter established catheter was not in the correct spot was counseled for cystoscopy under anesthesia    Operative findings: Chronic changes within the urethra and prostate fossa.  Could not establish any tract into the bladder    Operative procedure: The patient was brought to the operating room.  General anesthesia.  Lithotomy.  Sterile conditions    Cystoscopy was performed.  Note that the patient has significant edema within the scrotum and the penis but we are able to scope into the urethra without challenge.  The urethra at about its mid penile urethra begins to have significant damage appearance with disruption of the normal urethral mucosa and what appears to be chronic breakdown of the standard urethral channel.  I am able to scope up to the level of the membranous urethra but at this point any establishment of any identifiable urethral tract into the bladder cannot be discerned after multiple attempts.  Furthermore the tissue in this area feels firm and fixed    After multiple attempts I elected to place a suprapubic tube done open given his prior  abdominal procedures and is complicated medical history.    A midline infraumbilical incision is made through a prior abdominal incision and carried down through the subcutaneous tissue to divide the rectus fascia which is thin and atrophic.  Attempts were made to stay within the preperitoneal space for suprapubic tube placement however the prior abdominal surgery disrupts normal anatomy such that the dome of the bladder can be identified but this also appears to be within an intraperitoneal space that is identified.    A 20 [place] catheter is placed after identify a opening in the bladder and we put a 2-0 chromic pursestring around it to fix it in place filling the balloon with 20 cc.    We do place a 19 [place] Islander Alec drain through a separate stab incision in the abdomen given his intraperitoneal placement of this drain to ensure that we attempt to collect any urine draining around the catheter itself.  The Alec drain is fixed in position using a silk suture    We closed the fascia in the midline using interrupted #1 Vicryl we closed the skin using staples    The catheter was irrigated several times throughout the procedure and continues to irrigate well without significant leakage around the catheter and with good return of the irrigant.    A cystogram was performed at the end of the procedure through the catheter which is in place we are able to document the good filling of a confined space without extravasation into the abdomen and return of the contrast fluid    The MILAGROS was placed to bulb suction and the Chase is placed to straight drainage.  He is awoken from anesthesia and transferred recovery stable    Drains: 19 Alec drain in the abdomen and a 20 [place] Islander suprapubic Chase    Specimens: None    Estimated blood loss: 10    Complications: None    Jonathon Durand MD

## 2023-11-30 NOTE — PLAN OF CARE
Problem: Adult Inpatient Plan of Care  Goal: Absence of Hospital-Acquired Illness or Injury  Intervention: Identify and Manage Fall Risk  Recent Flowsheet Documentation  Taken 11/29/2023 1630 by Kirsty Kang RN  Safety Promotion/Fall Prevention:   activity supervised   bedside attendant     Problem: Adult Inpatient Plan of Care  Goal: Absence of Hospital-Acquired Illness or Injury  Intervention: Prevent Skin Injury  Recent Flowsheet Documentation  Taken 11/29/2023 1630 by Kristy Kang RN  Body Position: position changed independently  Taken 11/29/2023 1626 by Kristy Kang RN  Body Position: supine, legs elevated     Problem: Pain Acute  Goal: Optimal Pain Control and Function  Outcome: Progressing  Intervention: Develop Pain Management Plan  Recent Flowsheet Documentation  Taken 11/29/2023 1626 by Kristy Kang RN  Pain Management Interventions: rest  Intervention: Prevent or Manage Pain  Recent Flowsheet Documentation  Taken 11/29/2023 1630 by Kristy Kang RN  Medication Review/Management: medications reviewed   Goal Outcome Evaluation:      Plan of Care Reviewed With: patient    Overall Patient Progress: improvingOverall Patient Progress: improving       Phone call from radiologist around 1540 informing writer the bladder was full, and there did not appear to be a Fair Balloon with in  the bladder. When patient arrived back in his room from his ultrasound writer was prepared to attempt to deflate balloon and insert fair more.  On physical assessment Fair was fully inserted into urethra, penis and scrotum was edematous.  Handy from urology on unit and notified of situation.  Bladder scan done, was 517 ml.  Handy attempted reposition and replacement of Fair.  Patient in 9/10 Pain, appeared to be uncomfortable Hanyd gave a one time verbal order to give 0.5 mg IV Dilaudid early to have fair replaced.  Patient was on 1:1 monitored for respiratory depression and also on a  "pulse oximeter.  New order for CT of pelvis and scrotal ultrasound.  Radiologist called writer with results of CT, Dr. Durand paged with CT results.  After reviewing CT results Dr. Durand called back to say patient will be going to surgery at 2130.  Patient had eaten a \"small amount of rice porridge\" per his granddaughter via language line at about [id number].      "

## 2023-11-30 NOTE — ANESTHESIA POSTPROCEDURE EVALUATION
Patient: Rere Pwar    Procedure: Procedure(s):  CYSTOSCOPY, OPEN SUPRAPUBIC TUBE PLACEMENT       Anesthesia Type:  General    Note:  Disposition: Inpatient   Postop Pain Control: Uneventful            Sign Out: Well controlled pain   PONV: No   Neuro/Psych:             Events: Emergence delirium            Sign Out: Acceptable/Baseline neuro status   Airway/Respiratory:             Events: Pulmonary edema            Sign Out: Acceptable/Baseline resp. status; O2 supplementation               Oxygen: Face mask   CV/Hemodynamics: Uneventful            Sign Out: Acceptable CV status; No obvious hypovolemia; No obvious fluid overload   Other NRE: NONE   DID A NON-ROUTINE EVENT OCCUR? YES    Event details/Postop Comments:  Patient Simona speaking, appeared very agitated and not following commands on emergence in PACU, trying to sit up and take off oxygen. Breathing appeared very labored requiring, with audible wheezing, patient tachypneic and tachycardic. Attempt to calm patient down with use of simona phone  but patient not listening. Precedex bolus and gtt started with some improvement. Bipap started, CXR showing pulmonary edema, albuterol neb given with improvement over time. Patient eventually calm and able to listen to , but needing frequent redirection. BIPAP mask removed with improvement in breathing and agitation, facemask placed instead with sat 100%. Patient with similar emergence delirium after last anesthestic requiring 1:1 sitter. Feel patient is safe to return to inpatient room with 1:1 sitter for frequent redirection and re-orientation.            Last vitals:  Vitals Value Taken Time   /60 11/30/23 0100   Temp 37.1  C (98.8  F) 11/30/23 0100   Pulse 129 11/30/23 0113   Resp 32 11/30/23 0113   SpO2 90 % 11/30/23 0113   Vitals shown include unfiled device data.    Electronically Signed By: Laxmi Brewer MD  November 30, 2023  1:19 AM

## 2023-12-01 LAB
ANION GAP SERPL CALCULATED.3IONS-SCNC: 12 MMOL/L (ref 7–15)
BUN SERPL-MCNC: 33.4 MG/DL (ref 8–23)
CALCIUM SERPL-MCNC: 8.2 MG/DL (ref 8.8–10.2)
CHLORIDE SERPL-SCNC: 109 MMOL/L (ref 98–107)
CREAT SERPL-MCNC: 4.23 MG/DL (ref 0.67–1.17)
DEPRECATED HCO3 PLAS-SCNC: 22 MMOL/L (ref 22–29)
EGFRCR SERPLBLD CKD-EPI 2021: 15 ML/MIN/1.73M2
ERYTHROCYTE [DISTWIDTH] IN BLOOD BY AUTOMATED COUNT: 14.7 % (ref 10–15)
GLUCOSE SERPL-MCNC: 124 MG/DL (ref 70–99)
HCT VFR BLD AUTO: 22.7 % (ref 40–53)
HGB BLD-MCNC: 7.3 G/DL (ref 13.3–17.7)
MCH RBC QN AUTO: 30.4 PG (ref 26.5–33)
MCHC RBC AUTO-ENTMCNC: 32.2 G/DL (ref 31.5–36.5)
MCV RBC AUTO: 95 FL (ref 78–100)
PLATELET # BLD AUTO: 197 10E3/UL (ref 150–450)
POTASSIUM SERPL-SCNC: 3.6 MMOL/L (ref 3.4–5.3)
RBC # BLD AUTO: 2.4 10E6/UL (ref 4.4–5.9)
SODIUM SERPL-SCNC: 143 MMOL/L (ref 135–145)
WBC # BLD AUTO: 11.1 10E3/UL (ref 4–11)

## 2023-12-01 PROCEDURE — 120N000001 HC R&B MED SURG/OB

## 2023-12-01 PROCEDURE — 250N000011 HC RX IP 250 OP 636: Mod: JZ | Performed by: INTERNAL MEDICINE

## 2023-12-01 PROCEDURE — 99233 SBSQ HOSP IP/OBS HIGH 50: CPT | Performed by: INTERNAL MEDICINE

## 2023-12-01 PROCEDURE — 36415 COLL VENOUS BLD VENIPUNCTURE: CPT | Performed by: INTERNAL MEDICINE

## 2023-12-01 PROCEDURE — 80048 BASIC METABOLIC PNL TOTAL CA: CPT | Performed by: INTERNAL MEDICINE

## 2023-12-01 PROCEDURE — 99232 SBSQ HOSP IP/OBS MODERATE 35: CPT | Performed by: INTERNAL MEDICINE

## 2023-12-01 PROCEDURE — 250N000013 HC RX MED GY IP 250 OP 250 PS 637: Performed by: INTERNAL MEDICINE

## 2023-12-01 PROCEDURE — 250N000013 HC RX MED GY IP 250 OP 250 PS 637: Performed by: UROLOGY

## 2023-12-01 PROCEDURE — 85027 COMPLETE CBC AUTOMATED: CPT | Performed by: INTERNAL MEDICINE

## 2023-12-01 RX ADMIN — ARIPIPRAZOLE 5 MG: 5 TABLET ORAL at 08:27

## 2023-12-01 RX ADMIN — FLUOXETINE 20 MG: 20 CAPSULE ORAL at 08:27

## 2023-12-01 RX ADMIN — PIPERACILLIN AND TAZOBACTAM 3.38 G: 3; .375 INJECTION, POWDER, FOR SOLUTION INTRAVENOUS at 08:12

## 2023-12-01 RX ADMIN — PANTOPRAZOLE SODIUM 40 MG: 40 TABLET, DELAYED RELEASE ORAL at 06:28

## 2023-12-01 RX ADMIN — SIMVASTATIN 10 MG: 10 TABLET, FILM COATED ORAL at 20:55

## 2023-12-01 RX ADMIN — FUROSEMIDE 60 MG: 10 INJECTION, SOLUTION INTRAMUSCULAR; INTRAVENOUS at 05:45

## 2023-12-01 RX ADMIN — GABAPENTIN 100 MG: 100 CAPSULE ORAL at 20:55

## 2023-12-01 RX ADMIN — TRAZODONE HYDROCHLORIDE 50 MG: 50 TABLET ORAL at 20:55

## 2023-12-01 RX ADMIN — LEVOTHYROXINE SODIUM 25 MCG: 0.03 TABLET ORAL at 06:28

## 2023-12-01 RX ADMIN — PIPERACILLIN AND TAZOBACTAM 3.38 G: 3; .375 INJECTION, POWDER, FOR SOLUTION INTRAVENOUS at 20:55

## 2023-12-01 ASSESSMENT — ACTIVITIES OF DAILY LIVING (ADL)
ADLS_ACUITY_SCORE: 32
DEPENDENT_IADLS:: CLEANING;COOKING;LAUNDRY;SHOPPING;MEAL PREPARATION;MEDICATION MANAGEMENT;MONEY MANAGEMENT;TRANSPORTATION
ADLS_ACUITY_SCORE: 32

## 2023-12-01 NOTE — PLAN OF CARE
Problem: Adult Inpatient Plan of Care  Goal: Plan of Care Review  Description: The Plan of Care Review/Shift note should be completed every shift.  The Outcome Evaluation is a brief statement about your assessment that the patient is improving, declining, or no change.  This information will be displayed automatically on your shift  note.  Outcome: Progressing     Problem: Adult Inpatient Plan of Care  Goal: Absence of Hospital-Acquired Illness or Injury  Intervention: Identify and Manage Fall Risk  Recent Flowsheet Documentation  Taken 12/1/2023 0010 by Mayda Crespo RN  Safety Promotion/Fall Prevention:   activity supervised   bedside attendant  Intervention: Prevent Skin Injury  Recent Flowsheet Documentation  Taken 12/1/2023 0010 by Mayda Crespo RN  Body Position: position changed independently  Intervention: Prevent Infection  Recent Flowsheet Documentation  Taken 12/1/2023 0010 by Mayda Crespo RN  Infection Prevention: hand hygiene promoted     Problem: Adult Inpatient Plan of Care  Goal: Optimal Comfort and Wellbeing  Outcome: Progressing     Problem: Pain Acute  Goal: Optimal Pain Control and Function  Outcome: Progressing  Intervention: Prevent or Manage Pain  Recent Flowsheet Documentation  Taken 12/1/2023 0010 by Mayda Crespo RN  Medication Review/Management: medications reviewed     Problem: Urinary Retention  Goal: Effective Urinary Elimination  Outcome: Progressing     Problem: Suicide Risk  Goal: Absence of Self-Harm  Intervention: Assess Risk to Self and Maintain Safety  Recent Flowsheet Documentation  Taken 12/1/2023 0010 by Mayda Crespo RN  Enhanced Safety Measures:  at bedside   Goal Outcome Evaluation:  Pt is alert and oriented. VSS. O2 sat at 99% in 1L, O2 NC lowered to 1/5L satting at 96%. Denies pain. SPC intact and draining clear straw color urine output, adequate amount. MILAGROS draining minimal yellowish output. Scrotum and penis are swollen, penile meatus with  minimal bloody drainage. Pt on 1:1 bedside attendant. No noted delirium or behavior. Sleeping most of the time.

## 2023-12-01 NOTE — PLAN OF CARE
Goal Outcome Evaluation:      Plan of Care Reviewed With: patient    Overall Patient Progress: improvingOverall Patient Progress: improving       Problem: Pain Acute  Goal: Optimal Pain Control and Function  Outcome: Progressing  Intervention: Prevent or Manage Pain  Recent Flowsheet Documentation  Taken 12/1/2023 0832 by Marika Jaramillo, RN  Medication Review/Management: medications reviewed     Problem: Urinary Retention  Goal: Effective Urinary Elimination  Outcome: Progressing     Problem: Adult Inpatient Plan of Care  Goal: Readiness for Transition of Care  Outcome: Progressing    Denies pain. Abd is quite distended and taut. Spoke with Dr Holly at bedside about abdomen. Will reassess tomorrow. Pt had large formed soft BM today. SP cath in place. Large urine output. Clear light yellow urine. Generalized swelling. Scrotum swollen and red.

## 2023-12-01 NOTE — CONSULTS
Care Management Initial Consult    General Information  Assessment completed with:  (granddaughter Paw),    Type of CM/SW Visit: Initial Assessment    Primary Care Provider verified and updated as needed: Yes   Readmission within the last 30 days: no previous admission in last 30 days         Advance Care Planning: Advance Care Planning Reviewed:  (no HCD)          Communication Assessment  Patient's communication style: spoken language (non-English)    Hearing Difficulty or Deaf: no   Wear Glasses or Blind: no    Cognitive  Cognitive/Neuro/Behavioral: .WDL except, orientation  Level of Consciousness: alert  Arousal Level: opens eyes spontaneously  Orientation: disoriented to, time, situation  Mood/Behavior: calm  Best Language: 0 - No aphasia  Speech: clear    Living Environment:   People in home:  (friends)     Current living Arrangements: apartment      Able to return to prior arrangements: yes       Family/Social Support:  Care provided by:    Provides care for: no one      (grandchildren)          Description of Support System: Supportive, Involved         Current Resources:   Patient receiving home care services: Yes  Skilled Home Care Services: Skilled Nursing  Community Resources: PCA  Equipment currently used at home: walker, rolling, cane, quad  Supplies currently used at home:  (s/p catheter)     Does the patient's insurance plan have a 3 day qualifying hospital stay waiver?  yes    Lifestyle & Psychosocial Needs:  Social Determinants of Health     Food Insecurity: Not on file   Depression: Not at risk (8/25/2022)    PHQ-2     PHQ-2 Score: 0   Housing Stability: Not on file   Tobacco Use: Medium Risk (11/30/2023)    Patient History     Smoking Tobacco Use: Former     Smokeless Tobacco Use: Former     Passive Exposure: Not on file   Financial Resource Strain: Not on file   Alcohol Use: Not on file   Transportation Needs: Not on file   Physical Activity: Not on file   Interpersonal Safety: Not on file    Stress: Not on file   Social Connections: Not on file       Functional Status:  Prior to admission patient needed assistance:   Dependent ADLs:: Ambulation-walker, Ambulation-cane  Dependent IADLs:: Cleaning, Cooking, Laundry, Shopping, Meal Preparation, Medication Management, Money Management, Transportation       Additional Information:    Assessment completed with patient's granddaughter Geraldo Angel with assistance from Simona .  Patient lives with friends in an apartment. He requires some assist with ADLs and with all IADLs. Geraldo Angel and Geraldo Engel (granddaughter) are his PCAs. He has 10.5 hours per day and  hours.  [facility] provides weekly RN visits for medication management. Patient has a cane and walker for ambulating. Geraldo Angel will be primary family contact. Family will transport.     Paw states they will be able to provide s/p catheter management. They will need teach in hospital and again at home.     Notified [facility] of anticipated discharge and will need teach for new s/p catheter.        Barbara Young RN

## 2023-12-01 NOTE — PROGRESS NOTES
RENAL NOTE    REQUESTING PHYSICIAN: Terry    REASON FOR CONSULT: MAUREEN          ASSESSMENT/PLAN:  Severe enlarged prostate with associated severe  bleeding of prostate origin  Complicated abd s/p old  traumatic injury.   S/p malpositioned fair, hydronephrosis, hydro now resolving  Now urine appears to be draining via SPC, minimal MILAGROS output  Abd distension, follow but no other worrisome symptoms.     MAUREEN multifactorial, dye load 11/27, sepsis/ hemodynamics, and obstruction.   Suspect he has some ATN  Volume overload better, stop iv lasix.   Creat up slightly but rate of rise slowing, hope to see recovery soon.   Lytes ok  No need for HD today.     Anemia all  blood loss related? Down some today. Cont to trend.     Normal bp   No hx of HTN    Pain on gabapentin ?acute or chronic, cautious use with MAUREEN, watch for over sedation.     Will follow.    Jeannine Holly MD  [facility]  [phone]            HPI: 67 yo man admitted 11/27 with gross hematuria and malpositioned fair/ apparent  perforation/ and MAUREEN  On CBI  Treated for sepsis, vanco and zosyn.   IV contrast CT 11/27  ongoing hematuria    11/28 Cystoscopy with clot evacuation and fulguration and complex fair catheter insertion by  Archie  for clot urinary retention.    11/29 Then developed worse creat and hydronephrosis on non contrast CT   Cystoscopy and attempt at Fair catheter replacement with open suprapubic tube placement, for malpositioned fair posterior to bladder and urinary retention with failure to establish tract through the urethra    Pt now with suprapubic fair brisk UO  MILAGROS drain to drain presumed urine leak with scant serosang outpu  Interviewed with Simona  via phone.   Feels better. Breathing better.   Had a large BM, ate some, was up in chair, now back in bed and breathing is better than yesterday.       Hx of neurocysticercosis   Fatty liver.         REVIEW OF SYSTEMS:  COMPLETE REVIEW OF  SYSTEMS: reviewed and as above or negative      History reviewed. No pertinent past medical history.         ALLERGIES/SENSITIVITIES:  No Known Allergies       PHYSICAL EXAM:  Physical Exam   Temp: 97.5  F (36.4  C) Temp src: Oral BP: 131/60 Pulse: 91   Resp: 18 SpO2: 97 % O2 Device: None (Room air) Oxygen Delivery: 1/2 LPM  Vitals:    11/28/23 0615   Weight: 65.4 kg (144 lb 1.6 oz)     Vital Signs with Ranges  Temp:  [97.5  F (36.4  C)-98.4  F (36.9  C)] 97.5  F (36.4  C)  Pulse:  [76-93] 91  Resp:  [18-28] 18  BP: (103-131)/(57-67) 131/60  SpO2:  [95 %-99 %] 97 %  I/O last 3 completed shifts:  In: -   Out: 4449 [Urine:4400; Drains:49]         No data found.       General - awake and answers questions, on RA breathing comfortable  HEENT ATNC     Neck - no JVD  Respiratory - Lungs clear today   Cardiovascular - AP RRR no murmur  Abdomen -soft NT, drains  Extremities -warm perfused still has EVANGELIST but much less and arm/hand edema less.   Integumentary - intact, good turgor, no rash/lesions  Neurologic -no focal changes.   Psych:  Judgement intact, affect WNL  :  SPC good urine, MILAGROS scant.     Laboratory:     Recent Labs   Lab 12/01/23  0618 11/30/23  1758 11/30/23  0716 11/29/23  2234 11/29/23  0719 11/28/23  0636 11/27/23  1830   WBC 11.1*  --  14.2* 13.2* 17.1* 18.5* 11.8*   RBC 2.40*  --  2.46* 2.94* 2.98* 4.60 5.55   HGB 7.3* 8.3* 7.5* 9.2* 9.2* 14.2 17.1   HCT 22.7*  --  23.1* 27.1* 28.0* 42.1 48.8     --  161 156 191 332 292       Basic Metabolic Panel:  Recent Labs   Lab 12/01/23  0618 11/30/23  0716 11/29/23  0719 11/28/23  2107 11/28/23  0636 11/27/23  1830    142 142 141 136 136   POTASSIUM 3.6 4.5 3.9 4.3 4.3 3.8   CHLORIDE 109* 110* 111* 109* 101 100   CO2 22 21* 21* 20* 23 26   BUN 33.4* 28.8* 20.9 16.8 14.1 12.5   CR 4.23* 3.99* 2.55* 1.42* 1.27* 0.95   * 162* 146* 152* 175* 134*   KETAN 8.2* 8.1* 7.9* 8.0* 9.4 10.0       INR  Recent Labs   Lab 11/28/23  0636 11/28/23  0011   INR 1.05  1.01       Recent Labs   Lab Test 11/30/23  0716 11/29/23  0719   POTASSIUM 4.5 3.9   CHLORIDE 110* 111*   BUN 28.8* 20.9      Recent Labs   Lab Test 11/28/23  0859 11/27/23  1830 08/02/23  1416   ALBUMIN  --  4.6 4.7   BILITOTAL  --  0.7 0.5   ALT  --  48 41   AST  --  41 37   PROTEIN 30*  --   --        Personally reviewed today's laboratory studies      Thank you for involving us in the care of this patient. We will continue to follow along with you.      Jeannine Holly MD   [facility]  [phone]

## 2023-12-01 NOTE — PROGRESS NOTES
"  [facility] - POSTOP PROGRESS NOTE    PLACE OF SERVICE:  [facility]     SURGERY:   POD #3 after Cystoscopy with clot evacuation and fulguration and complex fair catheter insertion by  Archie  for clot urinary retention.    POD#2 after Cystoscopy and attempt at Fair catheter replacement with open suprapubic tube placement, for malpositioned fair posterior to bladder and urinary retention with failure to establish tract through the urethra    SUBJECTIVE:   Events: On 1:1 care. Renal US yesterday revealed resolving left hydronephrosis with persistent, trace hydronephrosis of right (not reported on previous renal US). Urine yellow without clots of sediment from SP catheter. Tolerating SP catheter. High urine output. Minimal serosanguinous drainage from MILAGROS. Pt denies abdominal and bilateral flank pain. Scrotal pain improving, but remains present. Afebrile.     Patient family present to assist with interpretation. Present for entire encounter.     OBJECTIVE:  PHYSICAL EXAM  Vital signs:  Temp: 97.7  F (36.5  C) Temp src: Oral BP: 130/65 Pulse: 86   Resp: 20 SpO2: 95 % O2 Device: None (Room air) Oxygen Delivery: 1/2 LPM Height: 156.2 cm (5' 1.5\") Weight: 65.4 kg (144 lb 1.6 oz)  Estimated body mass index is 26.79 kg/m  as calculated from the following:    Height as of this encounter: 1.562 m (5' 1.5\").    Weight as of this encounter: 65.4 kg (144 lb 1.6 oz).    General: In no acute distress.   Abdomen: Moderately distended, non tender, no CVA tenderness bilaterally. MILAGROS and SP site dressings intact. MILAGROS output 49 ml yesterday. Serosanguinous output.   : Uncircumcised penis. Significant edema noted throughout penis. Edema noted throughout scrotum. Tenderness to scrotum noted. No crepitus or necrotic tissue appreciated to penis or scrotum. Perineum mildly ecchymotic.       LABS:  INR   Date Value Ref Range Status   11/28/2023 1.05 0.85 - 1.15 Final      Lab Results   Component Value Date    WBC 14.2 " "11/30/2023     Lab Results   Component Value Date    RBC 2.46 11/30/2023     Lab Results   Component Value Date    HGB 7.5 11/30/2023    HGB 17.3 06/08/2016     Lab Results   Component Value Date    HCT 23.1 11/30/2023    HCT 51.0 06/08/2016     Lab Results   Component Value Date    MCV 94 11/30/2023    .7 06/08/2016     Lab Results   Component Value Date    MCH 30.5 11/30/2023    MCH 34.2 06/08/2016     Lab Results   Component Value Date    MCHC 32.5 11/30/2023    MCHC 33.9 06/08/2016     Lab Results   Component Value Date    RDW 14.5 11/30/2023     Lab Results   Component Value Date     11/30/2023       Creatinine   Date Value Ref Range Status   12/01/2023 4.23 (H) 0.67 - 1.17 mg/dL Final   07/26/2016 0.8 0.7 - 1.3 mg/dL Final     Sodium   Date Value Ref Range Status   12/01/2023 143 135 - 145 mmol/L Final     Comment:     Reference intervals for this test were updated on 09/26/2023 to more accurately reflect our healthy population. There may be differences in the flagging of prior results with similar values performed with this method. Interpretation of those prior results can be made in the context of the updated reference intervals.    07/26/2016 137.8 132.0 - 142.0 mmol/L Final     Potassium   Date Value Ref Range Status   12/01/2023 3.6 3.4 - 5.3 mmol/L Final   09/14/2021 4.0 3.5 - 5.0 mmol/L Final   07/26/2016 3.5 3.2 - 4.6 mmol/dL Final     No results found for: \"MAG\"    UA RESULTS:  Recent Labs   Lab Test 11/28/23  0859   COLOR Red*   APPEARANCE Cloudy*   URINEGLC Negative   URINEBILI Negative   URINEKETONE 5*   SG <1.005   UBLD 1.0 mg/dL*   URINEPH 5.0   PROTEIN 30*   NITRITE Negative   LEUKEST 250 Zeeshan/uL*   RBCU >182*   WBCU 66*     Urine Culture 11/28: no growth    Blood cultures x 2 on 11/28: NGTD      Lab Results: personally reviewed.     ASSESSMENT/PLAN:  POD#2 after Cystoscopy and attempt at Fair catheter replacement with open suprapubic tube placement, for malpositioned fair " posterior to bladder and urinary retention with failure to establish tract through the urethra  POD #3 after Cystoscopy with clot evacuation and fulguration and complex fair catheter insertion by Archie for clot urinary retention.     - Tolerating SP catheter. Good urine output. Hematuria resolved.   - Hgb 7.3. Transfusions prn per primary team.   - Creatinine continues to rise. 4.23 today. Repeat renal US with resolving left hydronephrosis. Nephrology following.   - Leukocytosis resolving. Continue IV antibiotics.   - MILAGROS output remains low.   - Keep scrotum and penis elevated with rolled towel  - Email sent to [facility] schedulers to arrange follow up with Dr. Almanza in 2-3 weeks. [facility] contact info and fair discharge instructions placed in discharge orders.     Will continue to follow.     Eliazar Spencer PA-C  [facility]   [phone]

## 2023-12-01 NOTE — PLAN OF CARE
Problem: Adult Inpatient Plan of Care  Goal: Absence of Hospital-Acquired Illness or Injury  Outcome: Progressing  Intervention: Identify and Manage Fall Risk  Recent Flowsheet Documentation  Taken 11/30/2023 1628 by Vannessa Peacock RN  Safety Promotion/Fall Prevention: activity supervised  Intervention: Prevent Skin Injury  Recent Flowsheet Documentation  Taken 11/30/2023 1628 by Vannessa Peacock RN  Body Position: supine, head elevated  Intervention: Prevent and Manage VTE (Venous Thromboembolism) Risk  Recent Flowsheet Documentation  Taken 11/30/2023 1628 by Vannessa Peacock RN  VTE Prevention/Management: SCDs (sequential compression devices) on  Intervention: Prevent Infection  Recent Flowsheet Documentation  Taken 11/30/2023 1628 by Vannessa Peacock RN  Infection Prevention: hand hygiene promoted     Problem: Adult Inpatient Plan of Care  Goal: Optimal Comfort and Wellbeing  Outcome: Progressing  Intervention: Monitor Pain and Promote Comfort  Recent Flowsheet Documentation  Taken 11/30/2023 2036 by Vannessa Peacock RN  Pain Management Interventions: medication (see MAR)  Taken 11/30/2023 1632 by Vannessa Peacock RN  Pain Management Interventions:   rest   medication offered but refused     Problem: Urinary Retention  Goal: Effective Urinary Elimination  Outcome: Progressing   Goal Outcome Evaluation:    Pt is alert and oriented x4, on 1L of o2. Pt has mild pain in scrotum with movement. Scrotum and penis are swollen with bruising. Some bloody discharge coming form penile meatus.    Yellow drainage around MILAGROS and SP cath site, dressing changed x1. SP cathter draining well.     Pt did not order dinner, family brought food from home. Pt does not have much of an appetite, encouraged increase fluid and food intake. Used Simona interpretor [employer] for all interactions with patient. One to one attendant in the room.    Vannessa Peacock RN.

## 2023-12-01 NOTE — PLAN OF CARE
#[id number] used for assessment this AM [id number]. Pt denies pain. Abdomen quite distended. Declined breakfast and requesting just tea. Remains 1:1 for pulling at lines.

## 2023-12-01 NOTE — PROGRESS NOTES
[facility]    Hospitalist Progress Note    Assessment & Plan   68 year old Simona Speaking male who was admitted on 11/27/2023 difficulty voiding and gross hematuria.      Impression:   Principal Problem:    Severe sepsis (H)    -- aggressive IV hydration and IV Zosyn and Vanco       Lactic acidosis -- suspect related to sepsis, better      Anemia, acute blood loss   -- hgb 7.5 today, will repeat at 3 PM and in AM      Urinary retention -- suspect 2nd to clots    S/P Suprapubic Catheter 11/29/23   -- had cystoscopy with clot removal this afternoon   -- required Suprapubic Catheter last evening (Urology unable to place fair)      Swollen Scrotum -- no obvious infection, is on Zosyn   -- will stop Vanco given MAUREEN and UC negative      Joseluis hematuria -- suspected to be from prostate vessels, no bladder lesions seen      MAUREEN (acute kidney injury) (H24) -- suspect from sepsis and transient hypotension   -- continue IV fluids and treatment of sepsis   -- hypotension resolved with fluids      Plan:  as above, discussed with Urology and Nephrology.     DVT Prophylaxis: Pneumatic Compression Devices  Code Status: Full Code    Disposition: Expected discharge home Monday    Toby James MD  Pager [phone]  Cell Phone [phone]  Text Page (7am to 6pm)  (50 min total)    Interval History   Reports no pain and feels OK.      Physical Exam   Temp: 97.7  F (36.5  C) Temp src: Oral BP: (!) 142/65 Pulse: 92   Resp: 18 SpO2: 97 % O2 Device: None (Room air) Oxygen Delivery: 1/2 LPM  Vitals:    11/28/23 0615   Weight: 65.4 kg (144 lb 1.6 oz)     Vital Signs with Ranges  Temp:  [97.5  F (36.4  C)-98.3  F (36.8  C)] 97.7  F (36.5  C)  Pulse:  [76-93] 92  Resp:  [18-28] 18  BP: (103-142)/(57-67) 142/65  SpO2:  [95 %-99 %] 97 %  I/O last 3 completed shifts:  In: 743 [P.O.:480; I.V.:263]  Out: 5589 [Urine:5525; Drains:64]    # Pain Assessment:      12/1/2023    12:06 PM   Current Pain Score    Patient currently in pain? denies   Pah s pain level was assessed and he currently denies pain.        Constitutional: Awake, alert, cooperative, no apparent distress  Respiratory: Clear to auscultation bilaterally, no crafckles or wheezing  Cardiovascular: Regular rate and rhythm, normal S1 and S2, and no murmur noted  GI: Normal bowel sounds, soft, mild distension, non-tender  Scrotum and Penis both edematous, but no tenderness and no skin breakdown  Extrem: No calf tenderness, trace bilateral ankle edema  Neuro: Appeared appropriate this AM, no focal motor or sensory deficits    Medications    [Held by provider] sodium chloride Stopped (11/30/23 1346)      acetaminophen  975 mg Oral Once    ARIPiprazole  5 mg Oral Daily    FLUoxetine  20 mg Oral Daily    [Held by provider] furosemide  60 mg Intravenous Q8H    gabapentin  100 mg Oral At Bedtime    levothyroxine  25 mcg Oral Daily    pantoprazole  40 mg Oral QAM AC    piperacillin-tazobactam  3.375 g Intravenous Q12H    simvastatin  10 mg Oral At Bedtime    sodium chloride (PF)  3 mL Intracatheter Q8H    traZODone  50 mg Oral At Bedtime       Data   Recent Labs   Lab 12/01/23  0618 11/30/23  1758 11/30/23  0716 11/29/23  2234 11/29/23  0719 11/28/23  2107 11/28/23  0636 11/28/23  0011 11/27/23  1830   WBC 11.1*  --  14.2* 13.2* 17.1*  --  18.5*  --  11.8*   HGB 7.3* 8.3* 7.5* 9.2* 9.2*  --  14.2  --  17.1   MCV 95  --  94 92 94  --  92  --  88     --  161 156 191  --  332  --  292   INR  --   --   --   --   --   --  1.05 1.01  --      --  142  --  142   < > 136  --  136   POTASSIUM 3.6  --  4.5  --  3.9   < > 4.3  --  3.8   CHLORIDE 109*  --  110*  --  111*   < > 101  --  100   CO2 22  --  21*  --  21*   < > 23  --  26   BUN 33.4*  --  28.8*  --  20.9   < > 14.1  --  12.5   CR 4.23*  --  3.99*  --  2.55*   < > 1.27*  --  0.95   ANIONGAP 12  --  11  --  10   < > 12  --  10   KETAN 8.2*  --  8.1*  --  7.9*   < > 9.4  --  10.0   *  --  162*  --   146*   < > 175*  --  134*   ALBUMIN  --   --   --   --   --   --   --   --  4.6   PROTTOTAL  --   --   --   --   --   --   --   --  8.1   BILITOTAL  --   --   --   --   --   --   --   --  0.7   ALKPHOS  --   --   --   --   --   --   --   --  100   ALT  --   --   --   --   --   --   --   --  48   AST  --   --   --   --   --   --   --   --  41    < > = values in this interval not displayed.       Imaging:   Recent Results (from the past 24 hour(s))   US Renal Complete Non-Vascular    Narrative    EXAM: US RENAL COMPLETE NON-VASCULAR  LOCATION: [facility]  DATE: 11/30/2023    INDICATION: Worsening creatinine; S p SP catheter placement in OR 11 29 d t malpositioned fair, unable to guide new fair through urethra, mild left hydro distended bladder prior to SP placement.  COMPARISON: CT 11/29/2023  TECHNIQUE: Routine Bilateral Renal and Bladder Ultrasound.    FINDINGS:    RIGHT KIDNEY: 9.5 cm. Normal cortical echogenicity. Trace hydronephrosis, similar to recent CT.    LEFT KIDNEY: 9.6 cm. Normal cortical echogenicity. Near complete interval resolution of hydronephrosis.  Simple appearing cyst in the upper pole measuring up to 1.5 cm, no specific follow-up recommended.    BLADDER: Decompressed with likely Fair retention balloon within the lumen of the bladder.      Impression    IMPRESSION:  1.  Urinary bladder decompressed with Fair catheter retention balloon likely in the lumen.  2.  Persistent trace right hydronephrosis.  3.  Near-complete resolution of left hydronephrosis.

## 2023-12-02 LAB
ANION GAP SERPL CALCULATED.3IONS-SCNC: 12 MMOL/L (ref 7–15)
BUN SERPL-MCNC: 32.6 MG/DL (ref 8–23)
CALCIUM SERPL-MCNC: 8.8 MG/DL (ref 8.8–10.2)
CHLORIDE SERPL-SCNC: 106 MMOL/L (ref 98–107)
CREAT SERPL-MCNC: 4.33 MG/DL (ref 0.67–1.17)
DEPRECATED HCO3 PLAS-SCNC: 25 MMOL/L (ref 22–29)
EGFRCR SERPLBLD CKD-EPI 2021: 14 ML/MIN/1.73M2
ERYTHROCYTE [DISTWIDTH] IN BLOOD BY AUTOMATED COUNT: 14.4 % (ref 10–15)
GLUCOSE SERPL-MCNC: 108 MG/DL (ref 70–99)
HCT VFR BLD AUTO: 23.2 % (ref 40–53)
HGB BLD-MCNC: 7.6 G/DL (ref 13.3–17.7)
MCH RBC QN AUTO: 30.3 PG (ref 26.5–33)
MCHC RBC AUTO-ENTMCNC: 32.8 G/DL (ref 31.5–36.5)
MCV RBC AUTO: 92 FL (ref 78–100)
PLATELET # BLD AUTO: 224 10E3/UL (ref 150–450)
POTASSIUM SERPL-SCNC: 3.2 MMOL/L (ref 3.4–5.3)
RBC # BLD AUTO: 2.51 10E6/UL (ref 4.4–5.9)
SODIUM SERPL-SCNC: 143 MMOL/L (ref 135–145)
WBC # BLD AUTO: 9 10E3/UL (ref 4–11)

## 2023-12-02 PROCEDURE — 120N000001 HC R&B MED SURG/OB

## 2023-12-02 PROCEDURE — 250N000013 HC RX MED GY IP 250 OP 250 PS 637: Performed by: INTERNAL MEDICINE

## 2023-12-02 PROCEDURE — 99232 SBSQ HOSP IP/OBS MODERATE 35: CPT | Performed by: INTERNAL MEDICINE

## 2023-12-02 PROCEDURE — 36415 COLL VENOUS BLD VENIPUNCTURE: CPT | Performed by: INTERNAL MEDICINE

## 2023-12-02 PROCEDURE — 80048 BASIC METABOLIC PNL TOTAL CA: CPT | Performed by: INTERNAL MEDICINE

## 2023-12-02 PROCEDURE — 250N000013 HC RX MED GY IP 250 OP 250 PS 637: Performed by: UROLOGY

## 2023-12-02 PROCEDURE — 85027 COMPLETE CBC AUTOMATED: CPT | Performed by: INTERNAL MEDICINE

## 2023-12-02 PROCEDURE — 250N000011 HC RX IP 250 OP 636: Mod: JZ | Performed by: INTERNAL MEDICINE

## 2023-12-02 RX ORDER — POTASSIUM CHLORIDE 1500 MG/1
40 TABLET, EXTENDED RELEASE ORAL ONCE
Qty: 2 TABLET | Refills: 0 | Status: COMPLETED | OUTPATIENT
Start: 2023-12-02 | End: 2023-12-02

## 2023-12-02 RX ADMIN — PANTOPRAZOLE SODIUM 40 MG: 40 TABLET, DELAYED RELEASE ORAL at 07:19

## 2023-12-02 RX ADMIN — FLUOXETINE 20 MG: 20 CAPSULE ORAL at 08:35

## 2023-12-02 RX ADMIN — PIPERACILLIN AND TAZOBACTAM 3.38 G: 3; .375 INJECTION, POWDER, FOR SOLUTION INTRAVENOUS at 08:35

## 2023-12-02 RX ADMIN — ARIPIPRAZOLE 5 MG: 5 TABLET ORAL at 08:35

## 2023-12-02 RX ADMIN — POTASSIUM CHLORIDE 40 MEQ: 1500 TABLET, EXTENDED RELEASE ORAL at 10:41

## 2023-12-02 RX ADMIN — LEVOTHYROXINE SODIUM 25 MCG: 0.03 TABLET ORAL at 07:19

## 2023-12-02 RX ADMIN — TRAZODONE HYDROCHLORIDE 50 MG: 50 TABLET ORAL at 22:03

## 2023-12-02 RX ADMIN — GABAPENTIN 100 MG: 100 CAPSULE ORAL at 22:03

## 2023-12-02 RX ADMIN — SIMVASTATIN 10 MG: 10 TABLET, FILM COATED ORAL at 22:03

## 2023-12-02 RX ADMIN — PIPERACILLIN AND TAZOBACTAM 3.38 G: 3; .375 INJECTION, POWDER, FOR SOLUTION INTRAVENOUS at 22:03

## 2023-12-02 ASSESSMENT — ACTIVITIES OF DAILY LIVING (ADL)
ADLS_ACUITY_SCORE: 32

## 2023-12-02 NOTE — PROGRESS NOTES
RENAL NOTE    REQUESTING PHYSICIAN: Terry    REASON FOR CONSULT: MAUREEN          ASSESSMENT/PLAN:  Severe enlarged prostate with associated severe  bleeding of prostate origin  Complicated abd s/p old  traumatic injury.   S/p malpositioned fair, hydronephrosis, hydro now resolving  Now urine appears to be draining via SPC, minimal MILAGROS output  Abd distension better.     MAUREEN multifactorial, dye load 11/27, sepsis/ hemodynamics, and obstruction.   Suspect he has some ATN  Volume overload better, stop iv lasix.   Creat leveling off  Lytes ok/volume ok  No need for HD today.   Hope to see Creat improve tomorrow.     Mild hypoK replaced.     Anemia all  blood loss related? Now leveling in 7's     Normal bp   No hx of HTN    Pain on gabapentin ?acute or chronic, cautious use with MAUREEN, watch for over sedation.     Will follow.    Jeannine Holly MD  [facility]  [phone]            HPI: 69 yo man admitted 11/27 with gross hematuria and malpositioned fair/ apparent  perforation/ and MAUREEN  Brisk UO off diuretics.   Little MILAGROS output, urine from SPT clear  Creat only up 4.2-->4.3 today.   More calm and less impulsive today  No sob      Hx of neurocysticercosis   Fatty liver.         REVIEW OF SYSTEMS:  COMPLETE REVIEW OF SYSTEMS: reviewed and as above or negative      History reviewed. No pertinent past medical history.         ALLERGIES/SENSITIVITIES:  No Known Allergies       PHYSICAL EXAM:  Physical Exam   Temp: 97.8  F (36.6  C) Temp src: Oral BP: (!) 146/72 Pulse: 82   Resp: 20 SpO2: 95 % O2 Device: None (Room air)    Vitals:    11/28/23 0615   Weight: 65.4 kg (144 lb 1.6 oz)     Vital Signs with Ranges  Temp:  [97.5  F (36.4  C)-98  F (36.7  C)] 97.8  F (36.6  C)  Pulse:  [82-92] 82  Resp:  [18-20] 20  BP: (131-146)/(60-72) 146/72  SpO2:  [93 %-97 %] 95 %  I/O last 3 completed shifts:  In: 1223 [P.O.:960; I.V.:263]  Out: 4110 [Urine:4050; Drains:60]         No data found.       General -  awake NAD,  RA breathing comfortable  HEENT ATNC     Neck - no JVD  Respiratory - Lungs clear today   Cardiovascular - AP RRR no murmur  Abdomen -soft NT, drains, less distension today. Chronic old scars.   Extremities -warm perfused much less EVANGELIST  Integumentary - intact, good turgor, no rash/lesions  Neurologic -no focal changes.   Psych:  Judgement intact, affect WNL  :  SPC good urine, MILAGROS scant.     Laboratory:     Recent Labs   Lab 12/02/23  0626 12/01/23  0618 11/30/23  1758 11/30/23  0716 11/29/23  2234 11/29/23  0719 11/28/23  0636   WBC 9.0 11.1*  --  14.2* 13.2* 17.1* 18.5*   RBC 2.51* 2.40*  --  2.46* 2.94* 2.98* 4.60   HGB 7.6* 7.3* 8.3* 7.5* 9.2* 9.2* 14.2   HCT 23.2* 22.7*  --  23.1* 27.1* 28.0* 42.1    197  --  161 156 191 332       Basic Metabolic Panel:  Recent Labs   Lab 12/02/23  0626 12/01/23  0618 11/30/23  0716 11/29/23  0719 11/28/23 2107 11/28/23  0636    143 142 142 141 136   POTASSIUM 3.2* 3.6 4.5 3.9 4.3 4.3   CHLORIDE 106 109* 110* 111* 109* 101   CO2 25 22 21* 21* 20* 23   BUN 32.6* 33.4* 28.8* 20.9 16.8 14.1   CR 4.33* 4.23* 3.99* 2.55* 1.42* 1.27*   * 124* 162* 146* 152* 175*   KETAN 8.8 8.2* 8.1* 7.9* 8.0* 9.4       INR  Recent Labs   Lab 11/28/23  0636 11/28/23  0011   INR 1.05 1.01       Recent Labs   Lab Test 11/30/23  0716 11/29/23  0719   POTASSIUM 4.5 3.9   CHLORIDE 110* 111*   BUN 28.8* 20.9      Recent Labs   Lab Test 11/28/23  0859 11/27/23  1830 08/02/23  1416   ALBUMIN  --  4.6 4.7   BILITOTAL  --  0.7 0.5   ALT  --  48 41   AST  --  41 37   PROTEIN 30*  --   --        Personally reviewed today's laboratory studies      Thank you for involving us in the care of this patient. We will continue to follow along with you.      Jeannine Holly MD   [facility]  [phone]

## 2023-12-02 NOTE — PLAN OF CARE
Goal Outcome Evaluation:                        Problem: Urinary Retention  Goal: Effective Urinary Elimination  Outcome: Progressing         Pt confused but easily redirectable. LS diminished. BS active. 1 small BM. SP ca th patent-urine clear yellow. Scrotum and Penis very swollen. Denies pain.   Ambulated to BR and up in chair for meals. Eating 50% of meals.

## 2023-12-02 NOTE — PROGRESS NOTES
Place of Service:  [facility]     Surgery:   POD #3 after Cystoscopy with clot evacuation and fulguration and complex fair catheter insertion by  Archie  for clot urinary retention.    POD#2 after Cystoscopy and attempt at Fair catheter replacement with open suprapubic tube placement, for malpositioned fair posterior to bladder and urinary retention with failure to establish tract through the urethra    SUBJECTIVE:  Events: no acute events overnight    Patient remains on 1:1. Good UOP via SP catheter, patient tolerating. Minimal MILAGROS output. Scrotal pain improving. Afebrile.     OBJECTIVE:  PHYSICAL EXAM:  Temp: 97.8  F (36.6  C) Temp src: Oral BP: (!) 146/72 Pulse: 82   Resp: 20 SpO2: 95 % O2 Device: None (Room air)    General: NAD, alert, cooperative  Head: normocephalic, without abnormality / atraumatic  Abdomen: Moderately distended, non tender, no CVA tenderness bilaterally. MILAGROS and SP site dressings intact. MILAGROS output 70 ml yesterday. Serosanguinous output.    Genitourinary: Uncircumcised penis. Moderate edema noted throughout penis. Edema noted throughout scrotum. Tenderness to scrotum noted. No crepitus or necrotic tissue appreciated to penis or scrotum. Perineum mildly ecchymotic.    Skin: No rashes or lesions  Musculoskeletal: moves all four extremities equally; no calf edema or tenderness  Psychological: alert and oriented, answers questions appropriately    LABS:  Creatinine   Date Value Ref Range Status   12/02/2023 4.33 (H) 0.67 - 1.17 mg/dL Final   07/26/2016 0.8 0.7 - 1.3 mg/dL Final     WBC Count   Date Value Ref Range Status   12/02/2023 9.0 4.0 - 11.0 10e3/uL Final     Hemoglobin   Date Value Ref Range Status   12/02/2023 7.6 (L) 13.3 - 17.7 g/dL Final   06/08/2016 17.3 13.3 - 17.7 g/dL Final   ]  Platelet Count   Date Value Ref Range Status   12/02/2023 224 150 - 450 10e3/uL Final       UA:  UA RESULTS:  Recent Labs   Lab Test 11/28/23  0859   COLOR Red*   APPEARANCE Cloudy*   URINEGLC  Negative   URINEBILI Negative   URINEKETONE 5*   SG <1.005   UBLD 1.0 mg/dL*   URINEPH 5.0   PROTEIN 30*   NITRITE Negative   LEUKEST 250 Zeeshan/uL*   RBCU >182*   WBCU 66*         Cultures:  Urine culture 11/28: No growth    Blood cultures x 2 11/28: NGTD    Lab Results: personally reviewed.     ASSESSMENT/PLAN:  POD#2 after Cystoscopy and attempt at Fair catheter replacement with open suprapubic tube placement, for malpositioned fair posterior to bladder and urinary retention with failure to establish tract through the urethra  POD #3 after Cystoscopy with clot evacuation and fulguration and complex fair catheter insertion by Archie for clot urinary retention.      - Tolerating SP catheter. Good urine output. Hematuria resolved.   - Hgb 7.6. Transfusions prn per primary team.   - Creatinine continues to rise. 4.33 today. Repeat renal US with resolving left hydronephrosis. Nephrology following. Appreciate recs.  - Leukocytosis resolved. Continue IV antibiotics.   - MILAGROS output remains low.   - Keep scrotum and penis elevated with rolled towel.  - Our office will call patient to arrange follow up with Dr. Almanza in 2-3 weeks. [facility] contact info and fair discharge instructions placed in discharge orders.       Haily Lester PA-C  [facility]   [phone]
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
## 2023-12-02 NOTE — PLAN OF CARE
"  Problem: Adult Inpatient Plan of Care  Goal: Patient-Specific Goal (Individualized)  Description: You can add care plan individualizations to a care plan. Examples of Individualization might be:  \"Parent requests to be called daily at 9am for status\", \"I have a hard time hearing out of my right ear\", or \"Do not touch me to wake me up as it startles  me\".  Outcome: Progressing  Goal: Absence of Hospital-Acquired Illness or Injury  Outcome: Progressing  Intervention: Identify and Manage Fall Risk  Recent Flowsheet Documentation  Taken 12/2/2023 0015 by Gisela Martines RN  Safety Promotion/Fall Prevention:   activity supervised   clutter free environment maintained   increased rounding and observation   lighting adjusted   nonskid shoes/slippers when out of bed   patient and family education   safety round/check completed  Intervention: Prevent Skin Injury  Recent Flowsheet Documentation  Taken 12/2/2023 0015 by Gisela Martines RN  Body Position: position changed independently  Taken 12/1/2023 2015 by Gisela Martines RN  Body Position: position changed independently  Intervention: Prevent Infection  Recent Flowsheet Documentation  Taken 12/2/2023 0015 by Gisela Martines RN  Infection Prevention: rest/sleep promoted  Goal: Optimal Comfort and Wellbeing  Outcome: Progressing  Intervention: Monitor Pain and Promote Comfort  Recent Flowsheet Documentation  Taken 12/1/2023 2015 by Gisela Martines RN  Pain Management Interventions: emotional support     Problem: Pain Acute  Goal: Optimal Pain Control and Function  Outcome: Progressing  Intervention: Develop Pain Management Plan  Recent Flowsheet Documentation  Taken 12/1/2023 2015 by Gisela Martines RN  Pain Management Interventions: emotional support  Intervention: Prevent or Manage Pain  Recent Flowsheet Documentation  Taken 12/2/2023 0015 by Gisela Martines RN  Sensory Stimulation Regulation: quiet environment promoted  Medication Review/Management: medications reviewed     Problem: " Urinary Retention  Goal: Effective Urinary Elimination  Outcome: Progressing     Pt denies pain or nausea. Suprapubic fair catheter patent and intact. Assist of 1 with walker. MILAGROS to bulb suction. Slept in between cares. Used Simona . Continue 1:1 sitter for pulling at lines.

## 2023-12-03 PROBLEM — Z86.59 HX OF PSYCHOSIS: Status: ACTIVE | Noted: 2023-12-03

## 2023-12-03 PROBLEM — F43.10 PTSD (POST-TRAUMATIC STRESS DISORDER): Status: ACTIVE | Noted: 2023-12-03

## 2023-12-03 PROBLEM — E03.9 ACQUIRED HYPOTHYROIDISM: Status: ACTIVE | Noted: 2023-12-03

## 2023-12-03 PROBLEM — R41.3 MEMORY IMPAIRMENT: Status: ACTIVE | Noted: 2023-12-03

## 2023-12-03 PROBLEM — E78.5 HYPERLIPIDEMIA: Status: ACTIVE | Noted: 2023-12-03

## 2023-12-03 PROBLEM — D62 ANEMIA DUE TO BLOOD LOSS, ACUTE: Status: ACTIVE | Noted: 2023-12-03

## 2023-12-03 PROBLEM — M10.9 GOUT: Status: ACTIVE | Noted: 2023-12-03

## 2023-12-03 PROBLEM — Z86.39 HISTORY OF VITAMIN D DEFICIENCY: Status: ACTIVE | Noted: 2023-12-03

## 2023-12-03 PROBLEM — R29.6 RECURRENT FALLS: Status: ACTIVE | Noted: 2023-12-03

## 2023-12-03 PROBLEM — M10.071 ACUTE IDIOPATHIC GOUT OF RIGHT ANKLE: Status: ACTIVE | Noted: 2023-12-03

## 2023-12-03 PROBLEM — F33.42 RECURRENT MAJOR DEPRESSION IN FULL REMISSION (H): Status: ACTIVE | Noted: 2023-12-03

## 2023-12-03 LAB
ANION GAP SERPL CALCULATED.3IONS-SCNC: 12 MMOL/L (ref 7–15)
BACTERIA BLD CULT: NO GROWTH
BACTERIA BLD CULT: NO GROWTH
BUN SERPL-MCNC: 29 MG/DL (ref 8–23)
CALCIUM SERPL-MCNC: 8.7 MG/DL (ref 8.8–10.2)
CHLORIDE SERPL-SCNC: 106 MMOL/L (ref 98–107)
CREAT SERPL-MCNC: 3.89 MG/DL (ref 0.67–1.17)
DEPRECATED HCO3 PLAS-SCNC: 24 MMOL/L (ref 22–29)
EGFRCR SERPLBLD CKD-EPI 2021: 16 ML/MIN/1.73M2
GLUCOSE SERPL-MCNC: 100 MG/DL (ref 70–99)
HOLD SPECIMEN: NORMAL
POTASSIUM SERPL-SCNC: 3.1 MMOL/L (ref 3.4–5.3)
SODIUM SERPL-SCNC: 142 MMOL/L (ref 135–145)

## 2023-12-03 PROCEDURE — 120N000001 HC R&B MED SURG/OB

## 2023-12-03 PROCEDURE — 250N000013 HC RX MED GY IP 250 OP 250 PS 637: Performed by: UROLOGY

## 2023-12-03 PROCEDURE — 99231 SBSQ HOSP IP/OBS SF/LOW 25: CPT | Performed by: INTERNAL MEDICINE

## 2023-12-03 PROCEDURE — 36415 COLL VENOUS BLD VENIPUNCTURE: CPT | Performed by: INTERNAL MEDICINE

## 2023-12-03 PROCEDURE — 250N000013 HC RX MED GY IP 250 OP 250 PS 637: Performed by: INTERNAL MEDICINE

## 2023-12-03 PROCEDURE — 99233 SBSQ HOSP IP/OBS HIGH 50: CPT | Performed by: INTERNAL MEDICINE

## 2023-12-03 PROCEDURE — 250N000011 HC RX IP 250 OP 636: Mod: JZ | Performed by: INTERNAL MEDICINE

## 2023-12-03 PROCEDURE — 80048 BASIC METABOLIC PNL TOTAL CA: CPT | Performed by: INTERNAL MEDICINE

## 2023-12-03 RX ORDER — POTASSIUM CHLORIDE 1500 MG/1
40 TABLET, EXTENDED RELEASE ORAL ONCE
Status: COMPLETED | OUTPATIENT
Start: 2023-12-03 | End: 2023-12-03

## 2023-12-03 RX ORDER — LEVOFLOXACIN 500 MG/1
500 TABLET, FILM COATED ORAL EVERY OTHER DAY
Status: DISCONTINUED | OUTPATIENT
Start: 2023-12-03 | End: 2023-12-05 | Stop reason: HOSPADM

## 2023-12-03 RX ADMIN — POTASSIUM CHLORIDE 40 MEQ: 1500 TABLET, EXTENDED RELEASE ORAL at 16:24

## 2023-12-03 RX ADMIN — PIPERACILLIN AND TAZOBACTAM 3.38 G: 3; .375 INJECTION, POWDER, FOR SOLUTION INTRAVENOUS at 08:48

## 2023-12-03 RX ADMIN — POTASSIUM CHLORIDE 40 MEQ: 1500 TABLET, EXTENDED RELEASE ORAL at 20:46

## 2023-12-03 RX ADMIN — FLUOXETINE 20 MG: 20 CAPSULE ORAL at 08:48

## 2023-12-03 RX ADMIN — LEVOTHYROXINE SODIUM 25 MCG: 0.03 TABLET ORAL at 06:48

## 2023-12-03 RX ADMIN — LEVOFLOXACIN 500 MG: 500 TABLET, FILM COATED ORAL at 18:22

## 2023-12-03 RX ADMIN — SIMVASTATIN 10 MG: 10 TABLET, FILM COATED ORAL at 20:47

## 2023-12-03 RX ADMIN — ARIPIPRAZOLE 5 MG: 5 TABLET ORAL at 08:48

## 2023-12-03 RX ADMIN — PANTOPRAZOLE SODIUM 40 MG: 40 TABLET, DELAYED RELEASE ORAL at 06:48

## 2023-12-03 RX ADMIN — GABAPENTIN 100 MG: 100 CAPSULE ORAL at 20:47

## 2023-12-03 RX ADMIN — TRAZODONE HYDROCHLORIDE 50 MG: 50 TABLET ORAL at 20:47

## 2023-12-03 ASSESSMENT — ACTIVITIES OF DAILY LIVING (ADL)
ADLS_ACUITY_SCORE: 32
ADLS_ACUITY_SCORE: 34
ADLS_ACUITY_SCORE: 32

## 2023-12-03 NOTE — PLAN OF CARE
"  Problem: Adult Inpatient Plan of Care  Goal: Patient-Specific Goal (Individualized)  Description: You can add care plan individualizations to a care plan. Examples of Individualization might be:  \"Parent requests to be called daily at 9am for status\", \"I have a hard time hearing out of my right ear\", or \"Do not touch me to wake me up as it startles  me\".  Outcome: Progressing  Goal: Absence of Hospital-Acquired Illness or Injury  Outcome: Progressing  Intervention: Identify and Manage Fall Risk  Recent Flowsheet Documentation  Taken 12/3/2023 0100 by Gisela Martines RN  Safety Promotion/Fall Prevention:   activity supervised   clutter free environment maintained   lighting adjusted   nonskid shoes/slippers when out of bed   patient and family education   safety round/check completed  Intervention: Prevent Skin Injury  Recent Flowsheet Documentation  Taken 12/3/2023 0500 by Gisela Martines RN  Body Position: position changed independently  Intervention: Prevent Infection  Recent Flowsheet Documentation  Taken 12/3/2023 0100 by Gisela Martines RN  Infection Prevention: rest/sleep promoted  Goal: Optimal Comfort and Wellbeing  Outcome: Progressing  Intervention: Monitor Pain and Promote Comfort  Recent Flowsheet Documentation  Taken 12/3/2023 0500 by Gisela Martines RN  Pain Management Interventions: emotional support  Taken 12/3/2023 0100 by Gisela Martines RN  Pain Management Interventions: emotional support  Taken 12/2/2023 2200 by Gisela Martines RN  Pain Management Interventions: emotional support     Problem: Pain Acute  Goal: Optimal Pain Control and Function  Outcome: Progressing  Intervention: Develop Pain Management Plan  Recent Flowsheet Documentation  Taken 12/3/2023 0500 by Gisela Martines RN  Pain Management Interventions: emotional support  Taken 12/3/2023 0100 by Gisela Martines RN  Pain Management Interventions: emotional support  Taken 12/2/2023 2200 by Gisela Martines RN  Pain Management Interventions: emotional " support  Intervention: Prevent or Manage Pain  Recent Flowsheet Documentation  Taken 12/3/2023 0100 by Gisela Martines RN  Sensory Stimulation Regulation: quiet environment promoted  Medication Review/Management: medications reviewed     Problem: Urinary Retention  Goal: Effective Urinary Elimination  Outcome: Progressing     Pt denies pain or nausea. MILAGROS and suprapubic catheter intact and patent. Slept in between cares. 1:1 sitter at beside.
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
## 2023-12-03 NOTE — PROGRESS NOTES
Place of Service:  [facility]     Surgery:   POD #3 after Cystoscopy with clot evacuation and fulguration and complex fair catheter insertion by Archie for clot urinary retention.    POD#2 after Cystoscopy and attempt at Fair catheter replacement with open suprapubic tube placement, for malpositioned fair posterior to bladder and urinary retention with failure to establish tract through the urethra    SUBJECTIVE:  Events: no acute events overnight    Patient remains on 1:1. Sitting up in the chair eating breakfast. Denies abdominal or flank pain, though some continued discomfort to the SP tube insertion site. Minimal penile/scrotal pain. Afebrile. Good UOP via SP tube. Minimal MILAGROS output.    OBJECTIVE:  PHYSICAL EXAM:  Temp: 98.8  F (37.1  C) Temp src: Oral BP: (!) 148/74 Pulse: 82   Resp: 18 SpO2: 97 % O2 Device: None (Room air)    General: NAD, alert, cooperative  Head: normocephalic, without abnormality / atraumatic  Abdomen: Moderately distended, non tender, no CVA tenderness bilaterally. MILAGROS and SP site dressings intact. MILAGROS output 40 ml overnight. Serosanguinous output   Genitourinary: Uncircumcised penis. Moderate edema noted throughout penis. Edema noted throughout scrotum. Minimal tenderness to scrotum noted. No crepitus or necrotic tissue appreciated to penis or scrotum. Perineum mildly ecchymotic.    Skin: No rashes or lesions  Musculoskeletal: moves all four extremities equally; no calf edema or tenderness  Psychological: alert and oriented, answers questions appropriately    LABS:  Creatinine   Date Value Ref Range Status   12/03/2023 3.89 (H) 0.67 - 1.17 mg/dL Final   07/26/2016 0.8 0.7 - 1.3 mg/dL Final     WBC Count   Date Value Ref Range Status   12/02/2023 9.0 4.0 - 11.0 10e3/uL Final     Hemoglobin   Date Value Ref Range Status   12/02/2023 7.6 (L) 13.3 - 17.7 g/dL Final   06/08/2016 17.3 13.3 - 17.7 g/dL Final   ]  Platelet Count   Date Value Ref Range Status   12/02/2023 224 150 - 450  10e3/uL Final       UA:  UA RESULTS:  Recent Labs   Lab Test 11/28/23  0859   COLOR Red*   APPEARANCE Cloudy*   URINEGLC Negative   URINEBILI Negative   URINEKETONE 5*   SG <1.005   UBLD 1.0 mg/dL*   URINEPH 5.0   PROTEIN 30*   NITRITE Negative   LEUKEST 250 Zeeshan/uL*   RBCU >182*   WBCU 66*         Cultures:  Urine culture 11/28: No growth     Blood cultures x 2 11/28: NGTD    Lab Results: personally reviewed.     ASSESSMENT/PLAN:  POD#2 after Cystoscopy and attempt at Fair catheter replacement with open suprapubic tube placement, for malpositioned fair posterior to bladder and urinary retention with failure to establish tract through the urethra  POD #3 after Cystoscopy with clot evacuation and fulguration and complex fair catheter insertion by Archie for clot urinary retention.      - Tolerating SP catheter. Good urine output. Hematuria resolved.   - Hgb 7.6. Transfusions prn per primary team.   - Creatinine improved today, 3.89. Repeat renal US (11/30) with resolving left hydronephrosis. Nephrology following. Appreciate recs.  - Leukocytosis resolved. Continue IV antibiotics.   - MILAGROS output remains low. Anticipate removal on day of discharge.   - Keep scrotum and penis elevated with rolled towel.  - Our office will call patient to arrange follow up with Dr. Almanza in 2-3 weeks. [facility] contact info and fair discharge instructions placed in discharge orders.   - Urology will continue to follow.       Haily Lester PA-C  [facility]   [phone]

## 2023-12-03 NOTE — PLAN OF CARE
Problem: Urinary Retention  Goal: Effective Urinary Elimination  Outcome: Progressing   Goal Outcome Evaluation:       Alert cooperative with cares moves well up with assist of one.  SP catheter patent urine clear hemoglobin 7.6   Eating well denies pain needs Simona  for cares.  Abdomen rounded firm MILAGROS in place, no family here today.

## 2023-12-03 NOTE — PROGRESS NOTES
[facility]    Medicine Progress Note - Hospitalist Service    Date of Admission:  11/27/2023    Assessment & Plan   69 yo Simona hallman M with h/o GERD, gout, hypothyroid, HLD, neurocysticercosis treated, memory impairment, PTSD, psychosis, MDD, and BPH who presented with radha hematuria and difficulty voiding.  He had a fair placed and placed on CBI.  Early next AM after admission he developed hypotension, lactic acidosis, MAUREEN, and leucocytosis c/w severe sepsis  He was aggressively hydrated, started on IV Abx for severe sepsis.  He had many clots requiring frequent irrigation of fair and eventually the catheter stopped draining so urology took the patient to the OR 11/28 for cystoscopy with evacuation of large volume of clot burden and replacement of fair.  However overnight that fair wasn't draining and CT showed 11/29 fair malpositioned with bilateral hydronephrosis so went back to OR 11/29 and unable to place fair so suprapubic catheter was placed.  He developed some fluid overload and started on IV lasix on 11/30.  His creat continued to climb to peak of 4.3 on 12/2.  Has been on 1:1 due to tendency to pull on catheter.  Family thinks they can stay with him at home and prevent him from pulling at catheter so when he is medically stable he can discharge to home with family.    Principal Problem:    Severe sepsis   - with lactic acidosis, hydrated initially, urine and blood cultures negative but clinical picture consistent with sepsis.   -Will change to oral antibiotics, stop zosyn and start Levaquin every other day      MAUREEN (acute kidney injury)  - multifactorial per nephrology due to IV contrast load 11/27, obstruction, and sepsis with transient hypotension  - obstruction relieved with suprapubic catheter, hydrated early on, sepsis treated  - creat is finally starting to improve.      Anemia due to acute blood loss  - presumably all due to severe radha hematuria   - has not  "required transfusion but is on borderline - hgb dropped from 17 (usually has erythrocytosis) to 7.6 today.      Joseluis hematuria  - likely from prostatic vessels      Urinary retention/bilateral hydronephrosis  - combination of BPH and fair trauma improved now with suprapubic catheter  - family thinks they can prevent him from pulling at catheter at home    Active Problems:      Acquired hypothyroidism  - continue levothyroxune      GERD (gastroesophageal reflux disease)  -Protonix      MDD with h/o psychosis  - continue prozac, trazodone, and abilify as at home      Hyperlipidemia  -zocor as at home      Swollen Scrotum   - improved, no obvious infection     Chronic stable medical issues    history of Neurocysticercosis    Gout    Memory impairment    PTSD (post-traumatic stress disorder)    Hx of Pelvic trauma -- remote hx of some injury [place], this might have created pelvic scarring and contributed to catheter placement issue         Diet: Renal Diet (non-dialysis)    DVT Prophylaxis: Pneumatic Compression Devices  Fair Catheter: Not present  Lines: None     Cardiac Monitoring: None  Code Status: Full Code      Clinically Significant Risk Factors        # Hypokalemia: Lowest K = 3.1 mmol/L in last 2 days, will replace as needed          # Acute Kidney Injury, unspecified: based on a >150% or 0.3 mg/dL increase in last creatinine compared to past 90 day average, will monitor renal function         # Overweight: Estimated body mass index is 26.79 kg/m  as calculated from the following:    Height as of this encounter: 1.562 m (5' 1.5\").    Weight as of this encounter: 65.4 kg (144 lb 1.6 oz).             Disposition Plan     Expected Discharge Date: 12/04/2023    Discharge Delays: IV Medication - consider oral or Home Infusion  1:1 Sitter still ordered - MD to assess  Oxygen Needs - Arrange Home O2                Brandan Gore MD  Hospitalist Service  [facility]  Securely message with Melania (more info)  Text page via [email or web address] Paging/Directory   ______________________________________________________________________    Interval History   Feels ok, denies any pain.  No CP/SOB/N/V/diar.  Occas cough    Physical Exam   Vital Signs: Temp: 98.1  F (36.7  C) Temp src: Oral BP: (!) 162/78 Pulse: 73   Resp: 18 SpO2: 96 % O2 Device: None (Room air)    Weight: 144 lbs 1.6 oz    General Appearance: Older M in NAD  Respiratory: rhonchi bilat  Cardiovascular: RRR S1S2 1/6 syst murmur, no edema  GI: +BS, soft, NT/ND, suprapubic cath and MILAGROS drain in place - no erythema or drainage  Skin: no rashes or lesions on exposed areas  Neuro: Alert, generally nonfocal on motor and sensory testing       Medical Decision Making       50 MINUTES SPENT BY ME on the date of service doing chart review, history, exam, documentation & further activities per the note.      Data     I have personally reviewed the following data over the past 24 hrs:    N/A  \   N/A   / N/A     142 106 29.0 (H) /  100 (H)   3.1 (L) 24 3.89 (H) \       Imaging results reviewed over the past 24 hrs:   No results found for this or any previous visit (from the past 24 hour(s)).

## 2023-12-04 LAB
ANION GAP SERPL CALCULATED.3IONS-SCNC: 10 MMOL/L (ref 7–15)
BUN SERPL-MCNC: 24.3 MG/DL (ref 8–23)
CALCIUM SERPL-MCNC: 8.6 MG/DL (ref 8.8–10.2)
CHLORIDE SERPL-SCNC: 108 MMOL/L (ref 98–107)
CREAT SERPL-MCNC: 3.39 MG/DL (ref 0.67–1.17)
DEPRECATED HCO3 PLAS-SCNC: 23 MMOL/L (ref 22–29)
EGFRCR SERPLBLD CKD-EPI 2021: 19 ML/MIN/1.73M2
GLUCOSE SERPL-MCNC: 112 MG/DL (ref 70–99)
HGB BLD-MCNC: 8.3 G/DL (ref 13.3–17.7)
POTASSIUM SERPL-SCNC: 4 MMOL/L (ref 3.4–5.3)
SODIUM SERPL-SCNC: 141 MMOL/L (ref 135–145)

## 2023-12-04 PROCEDURE — 36415 COLL VENOUS BLD VENIPUNCTURE: CPT | Performed by: INTERNAL MEDICINE

## 2023-12-04 PROCEDURE — 85018 HEMOGLOBIN: CPT | Performed by: INTERNAL MEDICINE

## 2023-12-04 PROCEDURE — 99232 SBSQ HOSP IP/OBS MODERATE 35: CPT | Performed by: PHYSICIAN ASSISTANT

## 2023-12-04 PROCEDURE — 80048 BASIC METABOLIC PNL TOTAL CA: CPT | Performed by: INTERNAL MEDICINE

## 2023-12-04 PROCEDURE — 99232 SBSQ HOSP IP/OBS MODERATE 35: CPT | Performed by: INTERNAL MEDICINE

## 2023-12-04 PROCEDURE — 250N000013 HC RX MED GY IP 250 OP 250 PS 637: Performed by: UROLOGY

## 2023-12-04 PROCEDURE — 120N000001 HC R&B MED SURG/OB

## 2023-12-04 PROCEDURE — 250N000013 HC RX MED GY IP 250 OP 250 PS 637: Performed by: INTERNAL MEDICINE

## 2023-12-04 RX ADMIN — ARIPIPRAZOLE 5 MG: 5 TABLET ORAL at 08:29

## 2023-12-04 RX ADMIN — GABAPENTIN 100 MG: 100 CAPSULE ORAL at 22:04

## 2023-12-04 RX ADMIN — PANTOPRAZOLE SODIUM 40 MG: 40 TABLET, DELAYED RELEASE ORAL at 06:36

## 2023-12-04 RX ADMIN — SIMVASTATIN 10 MG: 10 TABLET, FILM COATED ORAL at 22:04

## 2023-12-04 RX ADMIN — FLUOXETINE 20 MG: 20 CAPSULE ORAL at 08:29

## 2023-12-04 RX ADMIN — TRAZODONE HYDROCHLORIDE 50 MG: 50 TABLET ORAL at 22:04

## 2023-12-04 RX ADMIN — LEVOTHYROXINE SODIUM 25 MCG: 0.03 TABLET ORAL at 06:36

## 2023-12-04 ASSESSMENT — ACTIVITIES OF DAILY LIVING (ADL)
ADLS_ACUITY_SCORE: 33
ADLS_ACUITY_SCORE: 34
ADLS_ACUITY_SCORE: 33
ADLS_ACUITY_SCORE: 34
ADLS_ACUITY_SCORE: 34
ADLS_ACUITY_SCORE: 33
ADLS_ACUITY_SCORE: 32
ADLS_ACUITY_SCORE: 33
ADLS_ACUITY_SCORE: 32
ADLS_ACUITY_SCORE: 32

## 2023-12-04 NOTE — PROGRESS NOTES
RENAL PROGRESS NOTE    ASSESSMENT & PLAN:   Acute kidney injury - Multifactorial with contrast, sepsis/altered hemodynamics and obstruction. Now with resolving ATN and obstruction relieved, renal function is improving. Anticipate on-going renal recovery    Severe sepsis - With lactic acidosis. Initially hydrated; urine and blood cultures negative but clinic picture was consistent with sepsis. On oral antibiotics    Anemia - Due to acute blood loss with severe radha hematuria. Typically has erythrocytosis and now in 7 range    Radha hematuria - Likely from prostatic vessels    Urinary retention/bilateral hydronephrosis - With combination of BPH and fair trauma. Now with suprapubic catheter    Hypothyroidism - On levothyroxine    GERD - On PPI    MDD - With history of psychosis. On prozac, trazodone, abilify    Hyperlipidemia - On Zocor    H/o neurocysticercosis        SUBJECTIVE:  Patient new to me, chart reviewed. Comfortable, no new complaints. Off 1:1 and doing okay    OBJECTIVE:  Physical Exam   Temp: 98.2  F (36.8  C) Temp src: Oral BP: (!) 154/77 Pulse: 79   Resp: 20 SpO2: 96 % O2 Device: None (Room air)    Vitals:    11/28/23 0615   Weight: 65.4 kg (144 lb 1.6 oz)     Vital Signs with Ranges  Temp:  [98.1  F (36.7  C)-99.2  F (37.3  C)] 98.2  F (36.8  C)  Pulse:  [66-81] 79  Resp:  [16-20] 20  BP: (136-162)/(73-82) 154/77  SpO2:  [96 %-97 %] 96 %  I/O last 3 completed shifts:  In: 250 [P.O.:250]  Out: 1413 [Urine:1325; Drains:88]        No data found.  Intake/Output Summary (Last 24 hours) at 12/4/2023 1115  Last data filed at 12/4/2023 0600  Gross per 24 hour   Intake --   Output 1413 ml   Net -1413 ml       PHYSICAL EXAM:  General - awake no apparent distress  HEENT ATNC     Neck - no JVD  Respiratory - Lungs clear bilaterally  Cardiovascular - Normal S1S2, no rub  Abdomen -soft NT, drains. Chronic old scars.   Extremities -warm perfused much less EVANGELIST  Integumentary - intact, good turgor, no  rash/lesions  Neurologic -no focal changes.    -  SPC good urine, MILAGROS scant.      LABORATORY STUDIES:     Recent Labs   Lab 12/04/23  0922 12/02/23  0626 12/01/23  0618 11/30/23  1758 11/30/23  0716 11/29/23  2234 11/29/23  0719 11/28/23  0636   WBC  --  9.0 11.1*  --  14.2* 13.2* 17.1* 18.5*   RBC  --  2.51* 2.40*  --  2.46* 2.94* 2.98* 4.60   HGB 8.3* 7.6* 7.3* 8.3* 7.5* 9.2* 9.2* 14.2   HCT  --  23.2* 22.7*  --  23.1* 27.1* 28.0* 42.1   PLT  --  224 197  --  161 156 191 332       Basic Metabolic Panel:  Recent Labs   Lab 12/04/23  0637 12/03/23  0554 12/02/23  0626 12/01/23  0618 11/30/23  0716 11/29/23  0719    142 143 143 142 142   POTASSIUM 4.0 3.1* 3.2* 3.6 4.5 3.9   CHLORIDE 108* 106 106 109* 110* 111*   CO2 23 24 25 22 21* 21*   BUN 24.3* 29.0* 32.6* 33.4* 28.8* 20.9   CR 3.39* 3.89* 4.33* 4.23* 3.99* 2.55*   * 100* 108* 124* 162* 146*   KETAN 8.6* 8.7* 8.8 8.2* 8.1* 7.9*       Recent Labs   Lab Test 12/04/23  0922 12/02/23  0626 12/01/23  0618 11/29/23  0719 11/28/23  0636 11/28/23  0011   INR  --   --   --   --  1.05 1.01   WBC  --  9.0 11.1*   < > 18.5*  --    HGB 8.3* 7.6* 7.3*   < > 14.2  --    PLT  --  224 197   < > 332  --     < > = values in this interval not displayed.         Personally reviewed current labs      Paola Silva PA-C  [facility]  [phone]

## 2023-12-04 NOTE — PROGRESS NOTES
Place of Service:  [facility]     Surgery:   POD #4 after Cystoscopy with clot evacuation and fulguration and complex fair catheter insertion by Archie for clot urinary retention.    POD#3 after Cystoscopy and attempt at Fair catheter replacement with open suprapubic tube placement, for malpositioned fair posterior to bladder and urinary retention with failure to establish tract through the urethra    SUBJECTIVE:  Events: no acute events overnight    Pt resting in bed. Denies abdominal and bilateral flank pain. Minimal penile/scrotal edema, non-tender. Afebrile. Good UOP via SP tube. Minimal MILAGROS output.    OBJECTIVE:  PHYSICAL EXAM:  Temp: 98.2  F (36.8  C) Temp src: Oral BP: (!) 173/76 Pulse: 75   Resp: 24 SpO2: 98 % O2 Device: None (Room air)    General: NAD, alert, cooperative, up to BR for BM.   Head: normocephalic, without abnormality / atraumatic  Abdomen: Moderately distended, non tender, no CVA tenderness bilaterally. MILAGROS and SP site dressings removed. SP incision closed with staples, slightly pink along incision line bt without significant drainage or excessive warmth. MILAGROS output 93 ml yesterday, 25 ml so far today, serosanguinous.   Genitourinary: Uncircumcised penis. Now minimal edema and tenderness noted throughout penis and scrotum.  No crepitus or necrotic tissue appreciated to penis or scrotum. Perineum mildly ecchymotic.    Skin: No rashes or lesions  Musculoskeletal: moves all four extremities equally.  Psychological: alert and oriented, answers questions appropriately    LABS:  Creatinine   Date Value Ref Range Status   12/04/2023 3.39 (H) 0.67 - 1.17 mg/dL Final   07/26/2016 0.8 0.7 - 1.3 mg/dL Final     WBC Count   Date Value Ref Range Status   12/02/2023 9.0 4.0 - 11.0 10e3/uL Final     Hemoglobin   Date Value Ref Range Status   12/04/2023 8.3 (L) 13.3 - 17.7 g/dL Final   06/08/2016 17.3 13.3 - 17.7 g/dL Final   ]  Platelet Count   Date Value Ref Range Status   12/02/2023 224 150 -  450 10e3/uL Final       UA:  UA RESULTS:  Recent Labs   Lab Test 11/28/23  0859   COLOR Red*   APPEARANCE Cloudy*   URINEGLC Negative   URINEBILI Negative   URINEKETONE 5*   SG <1.005   UBLD 1.0 mg/dL*   URINEPH 5.0   PROTEIN 30*   NITRITE Negative   LEUKEST 250 Zeeshan/uL*   RBCU >182*   WBCU 66*     Cultures:  Urine culture 11/28: No growth     Blood cultures x 2 11/28: No growth    Lab Results: personally reviewed.     ASSESSMENT/PLAN:  POD#3 after Cystoscopy and attempt at Fair catheter replacement with open suprapubic tube placement, for malpositioned fair posterior to bladder and urinary retention with failure to establish tract through the urethra  POD #4 after Cystoscopy with clot evacuation and fulguration and complex fair catheter insertion by Archie for clot urinary retention.      - Tolerating SP catheter. Good urine output, clear yellow. Maintain at discharge.   - Hgb improving, 8.3 today. Monitor.   - Creatinine improving, 3.39. Repeat renal US (11/30) with trace right hydro and near complete resolution of left hydronephrosis. Nephrology following. Appreciate recs.  - Leukocytosis resolved 12/2. UC and BC: No growth. Recommend completing 10 day total antibiotic course.   - MILAGROS output remains low. Order placed to remove today.    - Our office will call patient to arrange follow up for staple removal on/around 12/11 (12 days post-op) and then with Dr. Almanza in 3-4 weeks. [facility] contact info and SP discharge instructions placed in discharge orders.   - Urology will continue to follow.     Discussed with Dr. Durand and update Dr. Almanza.       Handy Dominguez, APRN, CNP  [facility]   [phone]

## 2023-12-04 NOTE — PLAN OF CARE
Problem: Urinary Retention  Goal: Effective Urinary Elimination  Outcome: Progressing     Problem: Pain Acute  Goal: Optimal Pain Control and Function  Outcome: Progressing     Problem: Adult Inpatient Plan of Care  Goal: Optimal Comfort and Wellbeing  Outcome: Progressing   Goal Outcome Evaluation:         Pt Simona speaking. Alert and oriented, on room air. BP's a little elevated, MD made aware asked to be notified if BP systolic hits 200. On soft 1:1 observation in room for observation. MILAGROS intact, total output 33ml. Dressing changed once. SP catheter patent and draining adequately. Call light within reach.

## 2023-12-04 NOTE — PLAN OF CARE
"  Problem: Adult Inpatient Plan of Care  Goal: Patient-Specific Goal (Individualized)  Description: You can add care plan individualizations to a care plan. Examples of Individualization might be:  \"Parent requests to be called daily at 9am for status\", \"I have a hard time hearing out of my right ear\", or \"Do not touch me to wake me up as it startles  me\".  Outcome: Progressing  Goal: Absence of Hospital-Acquired Illness or Injury  Outcome: Progressing  Intervention: Identify and Manage Fall Risk  Recent Flowsheet Documentation  Taken 12/4/2023 0100 by Gisela Martines RN  Safety Promotion/Fall Prevention:   activity supervised   clutter free environment maintained   lighting adjusted   mobility aid in reach   nonskid shoes/slippers when out of bed   patient and family education   safety round/check completed  Intervention: Prevent Skin Injury  Recent Flowsheet Documentation  Taken 12/4/2023 0100 by Gisela Martines RN  Body Position: position changed independently  Intervention: Prevent Infection  Recent Flowsheet Documentation  Taken 12/4/2023 0100 by Gisela Martines RN  Infection Prevention: rest/sleep promoted  Goal: Optimal Comfort and Wellbeing  Outcome: Progressing  Intervention: Monitor Pain and Promote Comfort  Recent Flowsheet Documentation  Taken 12/4/2023 0500 by Gisela Martines RN  Pain Management Interventions: emotional support  Taken 12/4/2023 0100 by Gisela Martines RN  Pain Management Interventions: emotional support     Problem: Pain Acute  Goal: Optimal Pain Control and Function  Outcome: Progressing  Intervention: Develop Pain Management Plan  Recent Flowsheet Documentation  Taken 12/4/2023 0500 by Gisela Martines RN  Pain Management Interventions: emotional support  Taken 12/4/2023 0100 by Gisela Martines RN  Pain Management Interventions: emotional support  Intervention: Prevent or Manage Pain  Recent Flowsheet Documentation  Taken 12/4/2023 0100 by Gisela Martines RN  Sensory Stimulation Regulation: quiet " environment promoted  Medication Review/Management: medications reviewed     Problem: Urinary Retention  Goal: Effective Urinary Elimination  Outcome: Progressing     Pt denies pain or nausea. Suprapubic and MILAGROS patent and draining. Trialed off 1:1 sitter. No behaviors overnight. Slept in between cares. Falls precautions, nonskid socks on, call light within reach, bed alarm on.

## 2023-12-04 NOTE — PLAN OF CARE
Pt has a suprapubic and a MILAGROS in place. Penis is edematous. Hgb checked today and it was 8.3. Nephrology is following. Pt is an assist of one when up. He sat in chair for a while today.   Plan for home at discharge.  Denies pain. Simona speaking.    Pt MILAGROS was removed on evening shift.     Problem: Adult Inpatient Plan of Care  Goal: Plan of Care Review  Description: The Plan of Care Review/Shift note should be completed every shift.  The Outcome Evaluation is a brief statement about your assessment that the patient is improving, declining, or no change.  This information will be displayed automatically on your shift  note.  Outcome: Progressing     Problem: Adult Inpatient Plan of Care  Goal: Optimal Comfort and Wellbeing  Outcome: Progressing   Goal Outcome Evaluation:

## 2023-12-04 NOTE — PROGRESS NOTES
Care Management Follow Up    Length of Stay (days): 7    Expected Discharge Date: 12/04/2023     Concerns to be Addressed:       Patient plan of care discussed at interdisciplinary rounds: Yes    Anticipated Discharge Disposition:  home with home care     Anticipated Discharge Services:    Anticipated Discharge DME:      Patient/family educated on Medicare website which has current facility and service quality ratings:    Education Provided on the Discharge Plan:    Patient/Family in Agreement with the Plan:      Referrals Placed by CM/SW:    Private pay costs discussed: Not applicable    Additional Information:  Trial off 1:1 sitter, no behaviors per notes. Previous on 1:1 due to pulling on catheter.   Suprapubic and MILAGROS draining.   Oral antibiotics     Plan: discharge home with family supervision 24/7 and PCA services. And resume home care RN    Social Hx:  Patient lives with friends in an apartment. He requires some assist with ADLs and with all IADLs. Geraldo Angel and Geraldo Engel (granddaughter) are his PCAs. He has 10.5 hours per day and  hours.  [facility] provides weekly RN visits for medication management. Patient has a cane and walker for ambulating. Geraldo Angel will be primary family contact. Family will transport.      [facility] stated they will be able to provide s/p catheter management. Family will need teach in hospital and again at home.      Previous CM notified [facility] of anticipated discharge and will need teach for new s/p catheter.    Liv Chaidez RN

## 2023-12-05 VITALS
TEMPERATURE: 98.3 F | SYSTOLIC BLOOD PRESSURE: 153 MMHG | BODY MASS INDEX: 26.52 KG/M2 | DIASTOLIC BLOOD PRESSURE: 80 MMHG | RESPIRATION RATE: 19 BRPM | WEIGHT: 144.1 LBS | HEART RATE: 82 BPM | HEIGHT: 62 IN | OXYGEN SATURATION: 95 %

## 2023-12-05 LAB
ANION GAP SERPL CALCULATED.3IONS-SCNC: 13 MMOL/L (ref 7–15)
BUN SERPL-MCNC: 21.1 MG/DL (ref 8–23)
CALCIUM SERPL-MCNC: 9.1 MG/DL (ref 8.8–10.2)
CHLORIDE SERPL-SCNC: 107 MMOL/L (ref 98–107)
CREAT SERPL-MCNC: 3.09 MG/DL (ref 0.67–1.17)
DEPRECATED HCO3 PLAS-SCNC: 21 MMOL/L (ref 22–29)
EGFRCR SERPLBLD CKD-EPI 2021: 21 ML/MIN/1.73M2
GLUCOSE SERPL-MCNC: 106 MG/DL (ref 70–99)
HGB BLD-MCNC: 8.3 G/DL (ref 13.3–17.7)
POTASSIUM SERPL-SCNC: 3.6 MMOL/L (ref 3.4–5.3)
SODIUM SERPL-SCNC: 141 MMOL/L (ref 135–145)

## 2023-12-05 PROCEDURE — 250N000013 HC RX MED GY IP 250 OP 250 PS 637: Performed by: PHYSICIAN ASSISTANT

## 2023-12-05 PROCEDURE — 85018 HEMOGLOBIN: CPT | Performed by: INTERNAL MEDICINE

## 2023-12-05 PROCEDURE — 36415 COLL VENOUS BLD VENIPUNCTURE: CPT | Performed by: INTERNAL MEDICINE

## 2023-12-05 PROCEDURE — 250N000013 HC RX MED GY IP 250 OP 250 PS 637: Performed by: UROLOGY

## 2023-12-05 PROCEDURE — 250N000013 HC RX MED GY IP 250 OP 250 PS 637: Performed by: INTERNAL MEDICINE

## 2023-12-05 PROCEDURE — 99232 SBSQ HOSP IP/OBS MODERATE 35: CPT | Performed by: PHYSICIAN ASSISTANT

## 2023-12-05 PROCEDURE — 80048 BASIC METABOLIC PNL TOTAL CA: CPT | Performed by: INTERNAL MEDICINE

## 2023-12-05 PROCEDURE — 99239 HOSP IP/OBS DSCHRG MGMT >30: CPT | Performed by: FAMILY MEDICINE

## 2023-12-05 RX ORDER — LEVOFLOXACIN 500 MG/1
500 TABLET, FILM COATED ORAL EVERY OTHER DAY
Qty: 2 TABLET | Refills: 0 | Status: SHIPPED | OUTPATIENT
Start: 2023-12-07 | End: 2023-12-10

## 2023-12-05 RX ORDER — AMLODIPINE BESYLATE 2.5 MG/1
2.5 TABLET ORAL DAILY
Status: DISCONTINUED | OUTPATIENT
Start: 2023-12-05 | End: 2023-12-05 | Stop reason: HOSPADM

## 2023-12-05 RX ORDER — AMLODIPINE BESYLATE 2.5 MG/1
2.5 TABLET ORAL DAILY
Qty: 30 TABLET | Refills: 0 | Status: ON HOLD | OUTPATIENT
Start: 2023-12-06 | End: 2024-06-22

## 2023-12-05 RX ADMIN — FLUOXETINE 20 MG: 20 CAPSULE ORAL at 09:18

## 2023-12-05 RX ADMIN — ARIPIPRAZOLE 5 MG: 5 TABLET ORAL at 09:18

## 2023-12-05 RX ADMIN — LEVOFLOXACIN 500 MG: 500 TABLET, FILM COATED ORAL at 09:18

## 2023-12-05 RX ADMIN — LEVOTHYROXINE SODIUM 25 MCG: 0.03 TABLET ORAL at 06:44

## 2023-12-05 RX ADMIN — AMLODIPINE BESYLATE 2.5 MG: 2.5 TABLET ORAL at 12:03

## 2023-12-05 RX ADMIN — PANTOPRAZOLE SODIUM 40 MG: 40 TABLET, DELAYED RELEASE ORAL at 06:44

## 2023-12-05 ASSESSMENT — ACTIVITIES OF DAILY LIVING (ADL)
ADLS_ACUITY_SCORE: 36
ADLS_ACUITY_SCORE: 34
ADLS_ACUITY_SCORE: 36
ADLS_ACUITY_SCORE: 34
ADLS_ACUITY_SCORE: 36
ADLS_ACUITY_SCORE: 34
ADLS_ACUITY_SCORE: 36

## 2023-12-05 NOTE — PLAN OF CARE
Problem: Adult Inpatient Plan of Care  Goal: Plan of Care Review  Description: The Plan of Care Review/Shift note should be completed every shift.  The Outcome Evaluation is a brief statement about your assessment that the patient is improving, declining, or no change.  This information will be displayed automatically on your shift  note.  Outcome: Progressing     Problem: Adult Inpatient Plan of Care  Goal: Optimal Comfort and Wellbeing  Outcome: Progressing     Problem: Adult Inpatient Plan of Care  Goal: Readiness for Transition of Care  Outcome: Progressing   Goal Outcome Evaluation:       Pt is Simona speaking,  services utilized. He stated that he has been having trouble sleeping both at night and during the day. He gets scheduled trazodone at [facility]. Pt has been noted to be sleeping on periodic checks. Denies pain. MILAGROS drain site and suprapubic catheter dressings are CDI. Catheter patent with clear yellow urine. Penis/scrotal areas are edematous. Pt is awaiting discharge to home with family.       VSS with /81, 161/84

## 2023-12-05 NOTE — PROGRESS NOTES
RENAL PROGRESS NOTE    ASSESSMENT & PLAN:   Acute kidney injury - Multifactorial with contrast, sepsis/altered hemodynamics and obstruction. Now with resolving ATN and obstruction relieved, renal function is improving. Anticipate on-going renal recovery    Severe sepsis - With lactic acidosis. Initially hydrated; urine and blood cultures negative but clinic picture was consistent with sepsis. On oral antibiotics    Elevated blood pressure - With blood pressures persistently elevated over past 2 days would initiate low dose amlodipine and follow    Anemia - Due to acute blood loss with severe radha hematuria. Typically has erythrocytosis and now in 8 range    Radha hematuria - Likely from prostatic vessels    Urinary retention/bilateral hydronephrosis - With combination of BPH and fair trauma. Now with suprapubic catheter    Hypothyroidism - On levothyroxine    GERD - On PPI    MDD - With history of psychosis. On prozac, trazodone, abilify    Hyperlipidemia - On Zocor    H/o neurocysticercosis    Disposition - No objection to discharge from renal perspective. Should have BMP rechecked in next week to confirm on-going improvement in renal function. Hospital follow up scheduled for patient in our office for 1/8/24 with Adina Recinos PA-C at 10am        SUBJECTIVE:  Didn't sleep well last night and sleepy currently. No other complaints or issues    OBJECTIVE:  Physical Exam   Temp: 99  F (37.2  C) Temp src: Oral BP: (!) 164/87 Pulse: 86   Resp: 19 SpO2: 96 % O2 Device: None (Room air)    Vitals:    11/28/23 0615   Weight: 65.4 kg (144 lb 1.6 oz)     Vital Signs with Ranges  Temp:  [98  F (36.7  C)-99  F (37.2  C)] 99  F (37.2  C)  Pulse:  [74-86] 86  Resp:  [19-22] 19  BP: (161-184)/(81-87) 164/87  SpO2:  [95 %-97 %] 96 %  I/O last 3 completed shifts:  In: -   Out: 1330 [Urine:1300; Drains:30]        No data found.  Intake/Output Summary (Last 24 hours) at 12/4/2023 1115  Last data filed at 12/4/2023 0600  Gross per  24 hour   Intake --   Output 1413 ml   Net -1413 ml       PHYSICAL EXAM:  General - awake no apparent distress but appears tired  HEENT - Normocephalic, atraumatic  Neck - no JVD  Respiratory - Lungs clear bilaterally  Cardiovascular - Normal S1S2, no rub  Abdomen -soft NT, drains. Chronic old scars.   Extremities -warm perfused much less EVANGELIST  Integumentary - intact, no rash/lesions  Neurologic -no focal changes.    -  SPC good urine, MILAGROS scant.      LABORATORY STUDIES:     Recent Labs   Lab 12/05/23  0653 12/04/23  0922 12/02/23  0626 12/01/23  0618 11/30/23  1758 11/30/23  0716 11/29/23  2234 11/29/23  0719   WBC  --   --  9.0 11.1*  --  14.2* 13.2* 17.1*   RBC  --   --  2.51* 2.40*  --  2.46* 2.94* 2.98*   HGB 8.3* 8.3* 7.6* 7.3* 8.3* 7.5* 9.2* 9.2*   HCT  --   --  23.2* 22.7*  --  23.1* 27.1* 28.0*   PLT  --   --  224 197  --  161 156 191       Basic Metabolic Panel:  Recent Labs   Lab 12/05/23  0653 12/04/23  0637 12/03/23  0554 12/02/23  0626 12/01/23  0618 11/30/23  0716    141 142 143 143 142   POTASSIUM 3.6 4.0 3.1* 3.2* 3.6 4.5   CHLORIDE 107 108* 106 106 109* 110*   CO2 21* 23 24 25 22 21*   BUN 21.1 24.3* 29.0* 32.6* 33.4* 28.8*   CR 3.09* 3.39* 3.89* 4.33* 4.23* 3.99*   * 112* 100* 108* 124* 162*   KETAN 9.1 8.6* 8.7* 8.8 8.2* 8.1*       Recent Labs   Lab Test 12/05/23  0653 12/04/23  0922 12/02/23  0626 12/01/23  0618 11/29/23  0719 11/28/23  0636 11/28/23  0011   INR  --   --   --   --   --  1.05 1.01   WBC  --   --  9.0 11.1*   < > 18.5*  --    HGB 8.3* 8.3* 7.6* 7.3*   < > 14.2  --    PLT  --   --  224 197   < > 332  --     < > = values in this interval not displayed.         Personally reviewed current labs      Paola Silva PA-C  [facility]  [phone]

## 2023-12-05 NOTE — PROGRESS NOTES
Care Management Discharge Note    Discharge Date: 12/05/2023       Discharge Disposition: Home Care    Discharge Services: PCA    Discharge DME: None    Discharge Transportation:  family to transport    Private pay costs discussed: Not applicable    Does the patient's insurance plan have a 3 day qualifying hospital stay waiver?  No    PAS Confirmation Code: [id number]  Patient/family educated on Medicare website which has current facility and service quality ratings:      Education Provided on the Discharge Plan: Yes  Persons Notified of Discharge Plans: per care team  Patient/Family in Agreement with the Plan: yes    Handoff Referral Completed: Yes    Additional Information:  Discharge home with family supervision 24/7 and PCA services. And resume home care RN.  CM sent [facility] discharge orders.    No additional CM needs identified.     Liv Chaidez RN

## 2023-12-05 NOTE — PROGRESS NOTES
[facility]    Medicine Progress Note - Hospitalist Service    Date of Admission:  11/27/2023    Assessment & Plan   69 yo Simona hallman M with h/o GERD, gout, hypothyroid, HLD, neurocysticercosis treated, memory impairment, PTSD, psychosis, MDD, and BPH who presented with radha hematuria and difficulty voiding.  He had a fair placed and placed on CBI.  Early next AM after admission he developed hypotension, lactic acidosis, MAUREEN, and leucocytosis c/w severe sepsis  He was aggressively hydrated, started on IV Abx for severe sepsis.  He had many clots requiring frequent irrigation of fair and eventually the catheter stopped draining so urology took the patient to the OR 11/28 for cystoscopy with evacuation of large volume of clot burden and replacement of fair.  However overnight that fair wasn't draining and CT showed 11/29 fair malpositioned with bilateral hydronephrosis so went back to OR 11/29 and unable to place fair so suprapubic catheter was placed.  He developed some fluid overload and started on IV lasix on 11/30.  His creat continued to climb to peak of 4.3 on 12/2.  Has been on 1:1 due to tendency to pull on catheter.  Family thinks they can stay with him at home and prevent him from pulling at catheter so when he is medically stable he can discharge to home with family.    Principal Problem:    Severe sepsis   - with lactic acidosis, hydrated initially, urine and blood cultures negative but clinical picture consistent with sepsis.   -Changed to oral antibiotics 12/3, stopped zosyn and started Levaquin every other day      MAUREEN (acute kidney injury)  - multifactorial per nephrology due to IV contrast load 11/27, obstruction, and sepsis with transient hypotension  - obstruction relieved with suprapubic catheter, hydrated early on, sepsis treated  - creat is improving  - discharge if ok with nephrology 12/5.      Anemia due to acute blood loss  - presumably all due to severe  "radha hematuria   - has not required transfusion but is on borderline - hgb dropped from 17 (usually has erythrocytosis) to low of 7.3 but now up to 8.3 today.      Radha hematuria  - likely from prostatic vessels      Urinary retention/bilateral hydronephrosis  - combination of BPH and fair trauma improved now with suprapubic catheter  - family thinks they can prevent him from pulling at catheter at home  - urology is removing MILAGROS drain today and is ok with urology to discharge    Active Problems:      Acquired hypothyroidism  - continue levothyroxune      GERD (gastroesophageal reflux disease)  -Protonix      MDD with h/o psychosis  - continue prozac, trazodone, and abilify as at home      Hyperlipidemia  -zocor as at home      Swollen Scrotum   - improved, no obvious infection     Chronic stable medical issues    history of Neurocysticercosis    Gout    Memory impairment    PTSD (post-traumatic stress disorder)    Hx of Pelvic trauma -- remote hx of some injury when [place], this might have created pelvic scarring and contributed to catheter placement issue         Diet: Regular Diet Adult    DVT Prophylaxis: Pneumatic Compression Devices  Fair Catheter: Not present  Lines: None     Cardiac Monitoring: None  Code Status: Full Code      Clinically Significant Risk Factors        # Hypokalemia: Lowest K = 3.1 mmol/L in last 2 days, will replace as needed            # Acute Kidney Injury, unspecified: based on a >150% or 0.3 mg/dL increase in last creatinine compared to past 90 day average, will monitor renal function         # Overweight: Estimated body mass index is 26.79 kg/m  as calculated from the following:    Height as of this encounter: 1.562 m (5' 1.5\").    Weight as of this encounter: 65.4 kg (144 lb 1.6 oz).             Disposition Plan      Expected Discharge Date: 12/04/2023    Discharge Delays: Oxygen Needs - Arrange Home O2    Discharge Comments: nephrology and urology following   MILAGROS " drain            Brandan Gore MD  Hospitalist Service  [facility]  Securely message with enosiX (more info)  Text page via tinyclues Paging/Directory   ______________________________________________________________________    Interval History   No changes.  No abdominal pain.  No CP/SOB/N/V/diar.  Occas cough    Physical Exam   Vital Signs: Temp: 98  F (36.7  C) Temp src: Oral BP: (!) 184/84 Pulse: 86   Resp: 21 SpO2: 97 % O2 Device: None (Room air)    Weight: 144 lbs 1.6 oz    General Appearance: Older M in NAD  Respiratory: rhonchi bilat  Cardiovascular: RRR S1S2 1/6 syst murmur, no edema  GI: +BS, soft, NT/ND, suprapubic cath and MILAGROS drain in place - no erythema or drainage  Skin: no rashes or lesions on exposed areas  Neuro: Alert, generally nonfocal on motor and sensory testing       Medical Decision Making       35 MINUTES SPENT BY ME on the date of service doing chart review, history, exam, documentation & further activities per the note.      Data     I have personally reviewed the following data over the past 24 hrs:    N/A  \   8.3 (L)   / N/A     141 108 (H) 24.3 (H) /  112 (H)   4.0 23 3.39 (H) \       Imaging results reviewed over the past 24 hrs:   No results found for this or any previous visit (from the past 24 hour(s)).

## 2023-12-05 NOTE — DISCHARGE SUMMARY
"[facility]  Hospitalist Discharge Summary      Date of Admission:  11/27/2023  Date of Discharge:  12/5/2023  Discharging Provider: Alicia Cruz MD  Discharge Service: Hospitalist Service    Discharge Diagnoses     Severe sepsis, resolved.  Suspected urinary source.  Cultures negative.  MAUREEN, improving.  Multifactorial.    Radha hematuria, urinary retention, bilateral hydronephrosis status post suprapubic catheter placement; status post cystoscopy x 2 this hospital stay  Elevated blood pressure, suspected hypertension with medication started  Acute blood loss anemia  PTSD  MDD with psychosis; Neurontin being discontinued due to MAUREEN, consider resuming when renal function stabilized  Hypothyroidism  Hyperlipidemia  GERD  Neurocysticercosis  Gout  MCI  Previous pelvic trauma    Clinically Significant Risk Factors     # Overweight: Estimated body mass index is 26.79 kg/m  as calculated from the following:    Height as of this encounter: 1.562 m (5' 1.5\").    Weight as of this encounter: 65.4 kg (144 lb 1.6 oz).       Follow-ups Needed After Discharge   Follow-up Appointments     Follow-up and recommended labs and tests       [facility] will call you to arrange follow up with Dr. Almanza in 3-4   weeks. You may call to confirm appointment as needed. [phone]        Follow-up and recommended labs and tests       1.  Follow up with primary care provider, Coty Calix, within 7 days   for hospital follow- up.  The following labs/tests are recommended: Bmp to   check kidney function.  2.  Nephrology Hospital follow up scheduled for patient for 1/8/24 with   Adina Recinos PA-C at 10am  3.  Urology hospitalal follow up with [facility] as scheduled              Discharge Disposition   Discharged to home  Condition at discharge: Stable    Hospital Course   69 yo Simona speaking M with h/o PTSD, psychosis, MDD, and BPH who presented with radha hematuria and difficulty voiding.  He had a fair placed and " placed on CBI.  Early next AM after admission he developed hypotension, lactic acidosis, MAUREEN, and leucocytosis c/w severe sepsis  He was aggressively hydrated, started on IV Abx for severe sepsis.  He had many clots requiring frequent irrigation of fair and eventually the catheter stopped draining so urology took the patient to the OR 11/28 for cystoscopy with evacuation of large volume of clot burden and replacement of fair.  However overnight that fair wasn't draining and CT showed 11/29 fair malpositioned with bilateral hydronephrosis so went back to OR 11/29 and unable to place fair so suprapubic catheter was placed.  He developed some fluid overload and started on IV lasix on 11/30.  His creat continued to climb to peak of 4.3 on 12/2.  Did require  1:1 due to tendency to pull on catheter.  Eventually discontinued.  Continue to be treated with suprapubic Fair care, monitoring renal function, antibiotics and improved to the point where he was able to be discharged home in the care of his family who is PCA as well as home care RN.  Detailed hospital course per below;          Severe sepsis   - resolved   - with lactic acidosis, hydrated initially, urine and blood cultures negative but clinical picture consistent with sepsis.   -Changed to oral antibiotics 12/3, stopped zosyn and started Levaquin every other day - continue two more doses for discharge      MAUREEN (acute kidney injury)  - improving  --multifactorial per nephrology due to IV contrast load 11/27, obstruction, and sepsis with transient hypotension  - obstruction relieved with suprapubic catheter, hydrated early on, sepsis treated  - outpatient follow up with PCP and nephrology recommended    Elevated bp - nephrology started low dose amlodipinine 12/4      Anemia due to acute blood loss  - presumably all due to severe joseluis hematuria   - has not required transfusion       Joseluis hematuria  - resolved, likely from prostatic vessels      Urinary  retention/bilateral hydronephrosis  - combination of BPH and fair trauma improved now with suprapubic catheter  - family thinks they can prevent him from pulling at catheter at home  - urology is removing MILAGROS drain 12/4 and is ok with urology to discharge      Consultations This Hospital Stay   UROLOGY IP CONSULT  PHARMACY TO DOSE VANCO  NEPHROLOGY IP CONSULT  CARE MANAGEMENT / SOCIAL WORK IP CONSULT    Code Status   Full Code    Time Spent on this Encounter   I, Alicia Cruz MD, personally saw the patient today and spent greater than 30 minutes discharging this patient.       Alicia Cruz MD  [address] [address]  Phone: [phone]  Fax: [phone]  ______________________________________________________________________    Physical Exam   Vital Signs: Temp: 99  F (37.2  C) Temp src: Oral BP: (!) 149/82 Pulse: 78   Resp: 19 SpO2: 96 % O2 Device: None (Room air)    Weight: 144 lbs 1.6 oz  General Appearance: Pleasant male.  He is vigorously eating although complains of nausea when I am in the room  Respiratory: Clear to auscultation bilaterally  Cardiovascular: Regular rate and rhythm  GI: Soft and nontender, suprapubic catheter in place without bleeding or drainage  Skin: No significant lower extremity edema  Other: Neurologically nonfocal and grossly intact.  Patient seen in the presence of the Simona jarretter       Primary Care Physician   Coty Calix    Discharge Orders      Home Care Referral      Reason for your hospital stay    You had a suprapubic catheter placed to drain your bladder.     Follow-up and recommended labs and tests     [facility] will call you to arrange follow up with Dr. Almanza in 3-4 weeks. You may call to confirm appointment as needed. [phone]     Activity    Your activity upon discharge: Having a fair catheter does not limit your activity, however, it is recommended you do not drive with a fair catheter in place.      Tubes and drains    You are going home with the following tubes or drains: Suprapubic catheter    CATHETER CARE   You are being discharged with a suprapubic catheter. You may choose to alternate between a leg (day) bag and large (night) bag. Drain urine from the bag when it is about 2/3rds full. Use plain soap and water to wash urethral/groin area daily. Males - you may apply a small amount of Bacitracin or Neosporin ointment to the tip of the penis three times a day for comfort (decreases friction).     ACTIVITY   Having a suprapubiccatheter does not limit your activities, however, it is recommended you do not drive with a suprapubic catheter in place.     Seek medical evaluation if:  - You are feeling chilled or feverish, temperature >100.5.    - You develop thick cherry colored urine, clots or your catheter is not draining well.   - You develop purulent drainage from the urethra.     FOLLOW UP:   You will need monthly suprapubic catheter replacement. Dr. Almanza will do the first exchange and then you may follow up with [facility] monthly or your PCAs may be trained to do this exchange.     Follow Up (TCM)    Follow up with Adina Recinos PA-C, at [facility], [address] [place] [address], [address], [facility] [address], on 1/8/24 at 10am.         Appointments on [place] and/or [facility] (with [facility] or [facility] provider or service). Call [phone] if you haven't heard regarding these appointments within 7 days of discharge.     Reason for your hospital stay    Blood in your urine, kidney injury     Follow-up and recommended labs and tests     1.  Follow up with primary care provider, Coty Calix, within 7 days for hospital follow- up.  The following labs/tests are recommended: Bmp to check kidney function.  2.  Nephrology Hospital follow up scheduled for patient for 1/8/24 with Adina Recinos PA-C at 10am  3.  Urology hospitalal follow up with [facility] as scheduled     Activity    Your  activity upon discharge: activity as tolerated, no driving     Diet    Follow this diet upon discharge: Orders Placed This Encounter      Regular Diet Adult       Significant Results and Procedures   Most Recent 3 CBC's:  Recent Labs   Lab Test 12/05/23  0653 12/04/23  0922 12/02/23  0626 12/01/23  0618 11/30/23  1758 11/30/23  0716   WBC  --   --  9.0 11.1*  --  14.2*   HGB 8.3* 8.3* 7.6* 7.3*   < > 7.5*   MCV  --   --  92 95  --  94   PLT  --   --  224 197  --  161    < > = values in this interval not displayed.     Most Recent 3 BMP's:  Recent Labs   Lab Test 12/05/23  0653 12/04/23  0637 12/03/23  0554    141 142   POTASSIUM 3.6 4.0 3.1*   CHLORIDE 107 108* 106   CO2 21* 23 24   BUN 21.1 24.3* 29.0*   CR 3.09* 3.39* 3.89*   ANIONGAP 13 10 12   KETAN 9.1 8.6* 8.7*   * 112* 100*     7-Day Micro Results       No results found for the last 168 hours.          Most Recent TSH and T4:  Recent Labs   Lab Test 11/27/23  1830 08/02/23  1416 12/21/22  0925   TSH 2.70   < > 2.74   T4  --   --  1.28    < > = values in this interval not displayed.   ,   Results for orders placed or performed during the hospital encounter of 11/27/23   CT Abdomen Pelvis w Contrast    Narrative    EXAM: CT ABDOMEN PELVIS W CONTRAST  LOCATION: [facility]  DATE: 11/27/2023    INDICATION: abdominal pain, urinary retention  COMPARISON: CT 04/29/2019  TECHNIQUE: CT scan of the abdomen and pelvis was performed following injection of IV contrast. Multiplanar reformats were obtained. Dose reduction techniques were used.  CONTRAST: ISOVUE 370 75ML    FINDINGS:   LOWER CHEST: Enlarged heart. Lung bases are grossly clear.    HEPATOBILIARY: Diffuse hepatic steatosis. No evidence of biliary obstruction.    PANCREAS: Normal.    SPLEEN: Normal.    ADRENAL GLANDS: Normal.    KIDNEYS/BLADDER: Likely small bilateral renal cysts, no specific follow-up recommended. No hydronephrosis. Urinary bladder is decompressed by Chase  catheter. Possible hyperdense contrast the left aspect of the urinary bladder with separate subtly   hyperdense material surrounding the Chase catheter itself, could represent blood products (for example series 3 image 187).    BOWEL: No obstruction or inflammatory change. Normal appendix. Old torsed epiploic appendage adjacent to the descending colon.    LYMPH NODES: No suspicious lymphadenopathy.    VASCULATURE: Moderate calcified and noncalcified atherosclerosis.    PELVIC ORGANS: Stable prostatomegaly.    MUSCULOSKELETAL: Multiple healed pelvic fractures, stable from prior exam. No acute bony abnormality.        Impression    IMPRESSION:   1.  Possible intraluminal blood products surrounding the Chase catheter within the urinary bladder. No associated hydronephrosis. Could consider follow-up CT urogram if symptoms persist.  2.  Diffuse hepatic steatosis.   US Renal Complete Non-Vascular    Narrative    EXAM: US RENAL COMPLETE NON-VASCULAR  LOCATION: [facility]  DATE: 11/29/2023    INDICATION: Worsning creatinine. Recent GH s p cysto clot evac 11 28. Eval for hydro.  COMPARISON: CT 11/27/2023  TECHNIQUE: Routine Bilateral Renal and Bladder Ultrasound.    FINDINGS:    RIGHT KIDNEY: 8.6 x 5.6 x 5.4 cm. Normal without hydronephrosis or masses.     LEFT KIDNEY: 11.1 x 6.1 x 5.3 cm. Mild left hydronephrosis. 1.8 cm simple cyst which does not require further evaluation.     BLADDER: It is distended. The Chase catheter is not seen within the bladder but appears to be in the prostatic urethra.      Impression    IMPRESSION:  1.  Bladder distention with probable malpositioned Chase.  2.  Mild left hydronephrosis.  3.  Report called to nurse Casillas at 3:40 PM.   US Testicular & Scrotum w Doppler Ltd    Narrative    EXAM: US TESTICULAR AND SCROTUM WITH DOPPLER LIMITED  LOCATION: [facility]  DATE: 11/29/2023    INDICATION: Scrotal pain and swelling  COMPARISON: CT AP  11/29/2023, 11/27/2023  TECHNIQUE: Ultrasound of scrotum with color flow and spectral Doppler with waveform analysis performed.    FINDINGS:    RIGHT: Right testicle measures 3.5 x 2.3 x 2.7 cm. Normal testicle with no masses. Normal arterial duplex and normal color flow. Normal epididymis. No hydrocele. No varicocele.    LEFT: Left testicle measures 3.3 x 2.1 x 2.4 cm. Normal testicle with no masses. Normal arterial duplex and normal color flow. Normal epididymis. No hydrocele. No varicocele.    There is marked scrotal edema bilaterally.      Impression    IMPRESSION:  1.  Marked scrotal edema as noted on CT done earlier today.  2.  Testicles are unremarkable.  3.  Findings discussed by the undersigned with his nurse Tammy at 2124. Patient is in the OR to repair injury from misplaced Fair noted on CT done just prior to this US.   CT Abdomen Pelvis w/o Contrast    Narrative    EXAM: CT ABDOMEN PELVIS W/O CONTRAST  LOCATION: [facility]  DATE: 11/29/2023    INDICATION: Renal US today showed left hydro, distended bladder, fair in prostate urethra. Fair replaced with less than expected urine return. Known very large prostate. Please evaluate for fair position, hydro, bladder distention.  COMPARISON: None.  TECHNIQUE: CT scan of the abdomen and pelvis was performed without IV contrast. Multiplanar reformats were obtained. Dose reduction techniques were used.  CONTRAST: None.    FINDINGS:   LOWER CHEST: Mild dependent atelectasis. Moderate calcification of the RCA and proximal LAD.    HEPATOBILIARY: Diffuse hepatic steatosis. No calcified gallstones.    PANCREAS: Normal.    SPLEEN: Normal.    ADRENAL GLANDS: Normal.    KIDNEYS/BLADDER: Mild bilateral hydronephrosis, new. Mild bilateral hydroureter. Urinary bladder is distended measuring 8 cm AP by 14 cm long by 9.5 cm transversely. There is a moderate amount of gas not dependently in the urinary bladder.    A Fair catheter is present  within the urethra, traverses the prostate gland, but is then seen within the right upper pelvis posteriorly with the bone terminating in the right lower retroperitoneum at the level of the pelvic brim just anterior to the   right common iliac artery. The tube courses exits the superior posterior margin of the prostate gland best seen on sagittal 71. There is a small amount of retroperitoneal as well as mesenteric gas.    BOWEL: No bowel obstruction. Normal appendix.    LYMPH NODES: Normal.    VASCULATURE: No aneurysm.    PELVIC ORGANS: Moderate prostate gland enlargement.    MUSCULOSKELETAL: Diffuse subcutaneous edema around the lower abdomen and pelvis. Old healed pelvic fractures.      Impression    IMPRESSION:   1.  Malpositioned Chase catheter terminating in the right lower retroperitoneum. Catheter appears to exit the superior posterior margin of the prostate gland. There is a small amount of free air in the lower abdomen and pelvis related to tube placement.  2.  Distended urinary bladder.  3.  Mild hydronephrosis, new.    Results were discussed with patient's nurse, Jose Kang R.N. at approximately 1835 hours on 11/29/2023.     XR Surgery GENNA  Fluoro G/T 5 Min    Narrative    This exam was marked as non-reportable because it will not be read by a   radiologist or a [place] non-radiologist provider.         XR Chest Port 1 View    Narrative    EXAM: XR CHEST PORT 1 VIEW  LOCATION: [facility]  DATE: 11/30/2023    INDICATION: Dyspnea post surgery.  COMPARISON: Chest x-ray on 08/02/2023.      Impression    IMPRESSION: Single AP view of the chest was obtained. Mildly enlarged cardiac silhouette. Asymmetric elevation of the right hemidiaphragm as compared to the left. Mild basilar pulmonary opacities, likely atelectasis. No significant pleural effusion or   pneumothorax.   US Renal Complete Non-Vascular    Narrative    EXAM: US RENAL COMPLETE NON-VASCULAR  LOCATION: [facility]  DATE: 11/30/2023    INDICATION: Worsening creatinine; S p SP catheter placement in OR 11 29 d t malpositioned fair, unable to guide new fair through urethra, mild left hydro distended bladder prior to SP placement.  COMPARISON: CT 11/29/2023  TECHNIQUE: Routine Bilateral Renal and Bladder Ultrasound.    FINDINGS:    RIGHT KIDNEY: 9.5 cm. Normal cortical echogenicity. Trace hydronephrosis, similar to recent CT.    LEFT KIDNEY: 9.6 cm. Normal cortical echogenicity. Near complete interval resolution of hydronephrosis.  Simple appearing cyst in the upper pole measuring up to 1.5 cm, no specific follow-up recommended.    BLADDER: Decompressed with likely Fair retention balloon within the lumen of the bladder.      Impression    IMPRESSION:  1.  Urinary bladder decompressed with Fair catheter retention balloon likely in the lumen.  2.  Persistent trace right hydronephrosis.  3.  Near-complete resolution of left hydronephrosis.       Discharge Medications   Current Discharge Medication List        START taking these medications    Details   amLODIPine (NORVASC) 2.5 MG tablet Take 1 tablet (2.5 mg) by mouth daily for 30 days  Qty: 30 tablet, Refills: 0    Associated Diagnoses: Benign essential hypertension      levofloxacin (LEVAQUIN) 500 MG tablet Take 1 tablet (500 mg) by mouth every other day for 2 doses  Qty: 2 tablet, Refills: 0    Associated Diagnoses: Joseluis hematuria           CONTINUE these medications which have NOT CHANGED    Details   acetaminophen (TYLENOL) 650 MG CR tablet Take 1,300 mg by mouth every 8 hours      ARIPiprazole (ABILIFY) 5 MG tablet Take 5 mg by mouth daily      calcium carbonate-vitamin D (CALTRATE) 600-10 MG-MCG per tablet Take 1 tablet by mouth daily      diclofenac (VOLTAREN) 1 % topical gel Apply 2 g topically 4 times daily as needed for moderate pain (right knee)      fish oil-omega-3 fatty acids 1000 MG capsule Take 2 g by mouth daily      FLUoxetine (PROZAC) 20 MG  capsule Take 20 mg by mouth daily      levothyroxine (SYNTHROID, LEVOTHROID) 25 MCG tablet [LEVOTHYROXINE (SYNTHROID, LEVOTHROID) 25 MCG TABLET] Take 1 tablet by mouth daily.      multivitamin (ONE A DAY) per tablet Take 1 tablet by mouth daily      omeprazole (PRILOSEC) 20 MG capsule Take 20 mg by mouth daily      simvastatin (ZOCOR) 10 MG tablet Take 10 mg by mouth at bedtime      traZODone (DESYREL) 50 MG tablet Take  mg by mouth at bedtime      VITAMIN D3 2,000 unit capsule Take 4,000 Units by mouth daily  Refills: 3           STOP taking these medications       gabapentin (NEURONTIN) 300 MG capsule Comments:   Reason for Stopping:             Allergies   No Known Allergies

## 2023-12-05 NOTE — PLAN OF CARE
Goal Outcome Evaluation:    Pt had no complaints of pain this shift. No nausea noted. Suprapubic cath patent and draining. Nephrology/Urology following. IV SL. Pt is alert and orientated. Can be forgetful. Up with an assist of one with transfers and ambulation using a gait belt. Plan is for pt to discharge home with home care/family later today. Will continue to monitor.

## 2023-12-05 NOTE — PROGRESS NOTES
Place of Service:  [facility]     Surgery:   POD #5 after Cystoscopy with clot evacuation and fulguration and complex fair catheter insertion by Archie for clot urinary retention.    POD#4 after Cystoscopy and attempt at Fair catheter replacement with open suprapubic tube placement, for malpositioned fair posterior to bladder and urinary retention with failure to establish tract through the urethra    SUBJECTIVE:  Events: no acute events overnight. Pt reports nausea beginning this AM and vomited x 1.     Pt resting in bed. Denies abdominal/bilateral flank/penile/scrotal pain. Afebrile. Good UOP via SP tube. MILAGROS removed 12/4.     OBJECTIVE:  PHYSICAL EXAM:  Temp: 99  F (37.2  C) Temp src: Oral BP: (!) 149/82 Pulse: 78   Resp: 19 SpO2: 96 % O2 Device: None (Room air)    General: NAD, alert, cooperative, up to BR for BM.   Head: normocephalic, without abnormality / atraumatic  Abdomen: Soft, moderately distended, non tender, no CVA tenderness bilaterally. MILAGROS site dressing dry and intact. SP incision closed with staples, slightly pink along incision line but without significant drainage or excessive warmth.  Genitourinary: Uncircumcised penis. Penis and scrotum non-tender and without significant edema.   Skin: No rashes or lesions  Musculoskeletal: moves all four extremities equally.  Psychological: alert and answers most questions appropriately.     LABS:  Creatinine   Date Value Ref Range Status   12/05/2023 3.09 (H) 0.67 - 1.17 mg/dL Final   07/26/2016 0.8 0.7 - 1.3 mg/dL Final     WBC Count   Date Value Ref Range Status   12/02/2023 9.0 4.0 - 11.0 10e3/uL Final     Hemoglobin   Date Value Ref Range Status   12/05/2023 8.3 (L) 13.3 - 17.7 g/dL Final   06/08/2016 17.3 13.3 - 17.7 g/dL Final   ]  Platelet Count   Date Value Ref Range Status   12/02/2023 224 150 - 450 10e3/uL Final       UA:  UA RESULTS:  Recent Labs   Lab Test 11/28/23  0859   COLOR Red*   APPEARANCE Cloudy*   URINEGLC Negative   URINEBILI  Negative   URINEKETONE 5*   SG <1.005   UBLD 1.0 mg/dL*   URINEPH 5.0   PROTEIN 30*   NITRITE Negative   LEUKEST 250 Zeeshan/uL*   RBCU >182*   WBCU 66*     Cultures:  Urine culture 11/28: No growth     Blood cultures x 2 11/28: No growth    Lab Results: personally reviewed.     ASSESSMENT/PLAN:  POD#4 after Cystoscopy and attempt at Fair catheter replacement with open suprapubic tube placement, for malpositioned fair posterior to bladder and urinary retention with failure to establish tract through the urethra  POD #5 after Cystoscopy with clot evacuation and fulguration and complex fair catheter insertion by Archie for clot urinary retention.      - Tolerating SP catheter. Good urine output, clear yellow. Maintain at discharge.   - Hgb stable, 8.3 today. Monitor.   - Creatinine improving, 3.09 today.  Repeat renal US (11/30) with trace right hydro and near complete resolution of left hydronephrosis. Nephrology following. Appreciate recs.  - Leukocytosis resolved 12/2. UC and BC: No growth. Recommend completing 10 day total antibiotic course.   - MILAGROS removed 12/4. Dressing changes daily and prn to keep site dry.   - Our office will call patient to arrange follow up for staple removal on/around 12/11 (12 days post-op) and then with Dr. Almanza in 3-4 weeks. [facility] contact info and SP discharge instructions placed in discharge orders.   - [facility] will sign off. Please re-consult with any new or worsening urologic concerns.    Handy Dominguez, APRN, CNP  [facility]   [phone]

## 2023-12-06 NOTE — PROGRESS NOTES
Discharge instructions explained to pt and family using a staff on the [facility] who speaks ranjit. Pt pt an family verbalized understanding. Pt discharged home with a family.     Alicia Thomas RN

## 2023-12-13 ENCOUNTER — LAB REQUISITION (OUTPATIENT)
Dept: LAB | Facility: CLINIC | Age: 68
End: 2023-12-13

## 2023-12-13 DIAGNOSIS — N13.39 OTHER HYDRONEPHROSIS: ICD-10-CM

## 2023-12-13 DIAGNOSIS — R68.83 CHILLS (WITHOUT FEVER): ICD-10-CM

## 2023-12-13 LAB
ANION GAP SERPL CALCULATED.3IONS-SCNC: 14 MMOL/L (ref 7–15)
BUN SERPL-MCNC: 20.3 MG/DL (ref 8–23)
CALCIUM SERPL-MCNC: 9.7 MG/DL (ref 8.8–10.2)
CHLORIDE SERPL-SCNC: 102 MMOL/L (ref 98–107)
CREAT SERPL-MCNC: 2.28 MG/DL (ref 0.67–1.17)
DEPRECATED HCO3 PLAS-SCNC: 22 MMOL/L (ref 22–29)
EGFRCR SERPLBLD CKD-EPI 2021: 31 ML/MIN/1.73M2
GLUCOSE SERPL-MCNC: 127 MG/DL (ref 70–99)
POTASSIUM SERPL-SCNC: 4.4 MMOL/L (ref 3.4–5.3)
SODIUM SERPL-SCNC: 138 MMOL/L (ref 135–145)

## 2023-12-13 PROCEDURE — 87086 URINE CULTURE/COLONY COUNT: CPT | Performed by: NURSE PRACTITIONER

## 2023-12-13 PROCEDURE — 82310 ASSAY OF CALCIUM: CPT | Performed by: NURSE PRACTITIONER

## 2023-12-14 LAB — BACTERIA UR CULT: NO GROWTH

## 2023-12-27 ENCOUNTER — LAB REQUISITION (OUTPATIENT)
Dept: LAB | Facility: CLINIC | Age: 68
End: 2023-12-27

## 2023-12-27 DIAGNOSIS — Z00.00 ENCOUNTER FOR GENERAL ADULT MEDICAL EXAMINATION WITHOUT ABNORMAL FINDINGS: ICD-10-CM

## 2023-12-27 DIAGNOSIS — E78.2 MIXED HYPERLIPIDEMIA: ICD-10-CM

## 2023-12-27 LAB
ANION GAP SERPL CALCULATED.3IONS-SCNC: 14 MMOL/L (ref 7–15)
BUN SERPL-MCNC: 16.8 MG/DL (ref 8–23)
CALCIUM SERPL-MCNC: 9.7 MG/DL (ref 8.8–10.2)
CHLORIDE SERPL-SCNC: 101 MMOL/L (ref 98–107)
CHOLEST SERPL-MCNC: 157 MG/DL
CREAT SERPL-MCNC: 1.5 MG/DL (ref 0.67–1.17)
DEPRECATED HCO3 PLAS-SCNC: 22 MMOL/L (ref 22–29)
EGFRCR SERPLBLD CKD-EPI 2021: 51 ML/MIN/1.73M2
FASTING STATUS PATIENT QL REPORTED: ABNORMAL
GLUCOSE SERPL-MCNC: 155 MG/DL (ref 70–99)
HDLC SERPL-MCNC: 29 MG/DL
LDLC SERPL CALC-MCNC: ABNORMAL MG/DL
LDLC SERPL DIRECT ASSAY-MCNC: 48 MG/DL
NONHDLC SERPL-MCNC: 128 MG/DL
POTASSIUM SERPL-SCNC: 3.8 MMOL/L (ref 3.4–5.3)
SODIUM SERPL-SCNC: 137 MMOL/L (ref 135–145)
TRIGL SERPL-MCNC: 525 MG/DL

## 2023-12-27 PROCEDURE — 83721 ASSAY OF BLOOD LIPOPROTEIN: CPT | Performed by: FAMILY MEDICINE

## 2023-12-27 PROCEDURE — 80048 BASIC METABOLIC PNL TOTAL CA: CPT | Performed by: FAMILY MEDICINE

## 2023-12-27 PROCEDURE — 80061 LIPID PANEL: CPT | Performed by: FAMILY MEDICINE

## 2024-01-10 ENCOUNTER — LAB REQUISITION (OUTPATIENT)
Dept: LAB | Facility: CLINIC | Age: 69
End: 2024-01-10

## 2024-01-10 PROCEDURE — 82374 ASSAY BLOOD CARBON DIOXIDE: CPT | Performed by: FAMILY MEDICINE

## 2024-01-11 LAB
ANION GAP SERPL CALCULATED.3IONS-SCNC: 11 MMOL/L (ref 7–15)
BUN SERPL-MCNC: 11.6 MG/DL (ref 8–23)
CALCIUM SERPL-MCNC: 9.2 MG/DL (ref 8.8–10.2)
CHLORIDE SERPL-SCNC: 105 MMOL/L (ref 98–107)
CREAT SERPL-MCNC: 1.16 MG/DL (ref 0.67–1.17)
DEPRECATED HCO3 PLAS-SCNC: 25 MMOL/L (ref 22–29)
EGFRCR SERPLBLD CKD-EPI 2021: 69 ML/MIN/1.73M2
GLUCOSE SERPL-MCNC: 114 MG/DL (ref 70–99)
POTASSIUM SERPL-SCNC: 3.6 MMOL/L (ref 3.4–5.3)
SODIUM SERPL-SCNC: 141 MMOL/L (ref 135–145)

## 2024-02-15 ENCOUNTER — HOSPITAL ENCOUNTER (EMERGENCY)
Facility: HOSPITAL | Age: 69
Discharge: HOME | End: 2024-02-16
Attending: STUDENT IN AN ORGANIZED HEALTH CARE EDUCATION/TRAINING PROGRAM | Admitting: STUDENT IN AN ORGANIZED HEALTH CARE EDUCATION/TRAINING PROGRAM
Payer: COMMERCIAL

## 2024-02-15 DIAGNOSIS — T83.9XXA FOLEY CATHETER PROBLEM, INITIAL ENCOUNTER (H): ICD-10-CM

## 2024-02-15 PROCEDURE — 99284 EMERGENCY DEPT VISIT MOD MDM: CPT

## 2024-02-15 PROCEDURE — 99284 EMERGENCY DEPT VISIT MOD MDM: CPT | Mod: 25

## 2024-02-15 PROCEDURE — 51798 US URINE CAPACITY MEASURE: CPT

## 2024-02-15 ASSESSMENT — ACTIVITIES OF DAILY LIVING (ADL): ADLS_ACUITY_SCORE: 36

## 2024-02-16 VITALS
SYSTOLIC BLOOD PRESSURE: 147 MMHG | HEART RATE: 67 BPM | HEIGHT: 62 IN | TEMPERATURE: 99.8 F | BODY MASS INDEX: 24.84 KG/M2 | DIASTOLIC BLOOD PRESSURE: 82 MMHG | RESPIRATION RATE: 20 BRPM | OXYGEN SATURATION: 99 % | WEIGHT: 135 LBS

## 2024-02-16 ASSESSMENT — ACTIVITIES OF DAILY LIVING (ADL): ADLS_ACUITY_SCORE: 38
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
## 2024-02-16 NOTE — DISCHARGE INSTRUCTIONS
Although there is some urine leaking around the site of your suprapubic catheter is still draining urine appropriately.  There is no significant retention of urine in your bladder which means the Chase catheter is functioning appropriately.  Without any other symptoms of back pain, abdominal pain fevers or other symptoms it is unlikely of urinary tract infection.  Recommend routine follow-up with your primary care doctor and neurologist.

## 2024-02-16 NOTE — ED TRIAGE NOTES
Pt reports he had his catheter changed today, has been having urine leaking around the catheter as well as decreased urinary output and hematuria since that time.       Triage Assessment (Adult)       Row Name 02/15/24 2577          Triage Assessment    Airway WDL WDL        Respiratory WDL    Respiratory WDL WDL        Skin Circulation/Temperature WDL    Skin Circulation/Temperature WDL WDL        Cardiac WDL    Cardiac WDL WDL        Peripheral/Neurovascular WDL    Peripheral Neurovascular WDL WDL        Cognitive/Neuro/Behavioral WDL    Cognitive/Neuro/Behavioral WDL WDL        Freedom Coma Scale    Best Eye Response 4-->(E4) spontaneous     Best Motor Response 6-->(M6) obeys commands     Best Verbal Response 5-->(V5) oriented     Freedom Coma Scale Score 15

## 2024-05-20 ENCOUNTER — LAB REQUISITION (OUTPATIENT)
Dept: LAB | Facility: CLINIC | Age: 69
End: 2024-05-20

## 2024-05-20 PROCEDURE — 84443 ASSAY THYROID STIM HORMONE: CPT | Performed by: FAMILY MEDICINE

## 2024-05-20 PROCEDURE — 82306 VITAMIN D 25 HYDROXY: CPT | Performed by: FAMILY MEDICINE

## 2024-05-21 LAB
TSH SERPL DL<=0.005 MIU/L-ACNC: 3.49 UIU/ML (ref 0.3–4.2)
VIT D+METAB SERPL-MCNC: 27 NG/ML (ref 20–50)

## 2024-06-22 ENCOUNTER — APPOINTMENT (OUTPATIENT)
Dept: CT IMAGING | Facility: HOSPITAL | Age: 69
DRG: 698 | End: 2024-06-22
Attending: EMERGENCY MEDICINE
Payer: COMMERCIAL

## 2024-06-22 ENCOUNTER — HOSPITAL ENCOUNTER (INPATIENT)
Facility: HOSPITAL | Age: 69
LOS: 4 days | Discharge: HOME HEALTH | DRG: 698 | End: 2024-06-26
Attending: EMERGENCY MEDICINE | Admitting: STUDENT IN AN ORGANIZED HEALTH CARE EDUCATION/TRAINING PROGRAM
Payer: COMMERCIAL

## 2024-06-22 ENCOUNTER — APPOINTMENT (OUTPATIENT)
Dept: ULTRASOUND IMAGING | Facility: HOSPITAL | Age: 69
DRG: 698 | End: 2024-06-22
Attending: EMERGENCY MEDICINE
Payer: COMMERCIAL

## 2024-06-22 DIAGNOSIS — Z86.39 HISTORY OF DIABETES MELLITUS: ICD-10-CM

## 2024-06-22 DIAGNOSIS — N45.3 ORCHITIS AND EPIDIDYMITIS: ICD-10-CM

## 2024-06-22 DIAGNOSIS — A41.9 SEPSIS, DUE TO UNSPECIFIED ORGANISM, UNSPECIFIED WHETHER ACUTE ORGAN DYSFUNCTION PRESENT (H): Primary | ICD-10-CM

## 2024-06-22 LAB
ALBUMIN SERPL BCG-MCNC: 3.9 G/DL (ref 3.5–5.2)
ALBUMIN UR-MCNC: 50 MG/DL
ALP SERPL-CCNC: 157 U/L (ref 40–150)
ALT SERPL W P-5'-P-CCNC: 44 U/L (ref 0–70)
ANION GAP SERPL CALCULATED.3IONS-SCNC: 13 MMOL/L (ref 7–15)
APPEARANCE UR: ABNORMAL
AST SERPL W P-5'-P-CCNC: 49 U/L (ref 0–45)
BACTERIA #/AREA URNS HPF: ABNORMAL /HPF
BASE EXCESS BLDV CALC-SCNC: 1.3 MMOL/L (ref -3–3)
BASOPHILS # BLD AUTO: 0.1 10E3/UL (ref 0–0.2)
BASOPHILS NFR BLD AUTO: 1 %
BILIRUB SERPL-MCNC: 1.3 MG/DL
BILIRUB UR QL STRIP: NEGATIVE
BUN SERPL-MCNC: 9.3 MG/DL (ref 8–23)
CALCIUM SERPL-MCNC: 9.6 MG/DL (ref 8.8–10.2)
CHLORIDE SERPL-SCNC: 100 MMOL/L (ref 98–107)
COLOR UR AUTO: YELLOW
CREAT SERPL-MCNC: 1.15 MG/DL (ref 0.67–1.17)
DEPRECATED HCO3 PLAS-SCNC: 24 MMOL/L (ref 22–29)
EGFRCR SERPLBLD CKD-EPI 2021: 70 ML/MIN/1.73M2
EOSINOPHIL # BLD AUTO: 0.3 10E3/UL (ref 0–0.7)
EOSINOPHIL NFR BLD AUTO: 2 %
ERYTHROCYTE [DISTWIDTH] IN BLOOD BY AUTOMATED COUNT: 14.1 % (ref 10–15)
GLUCOSE SERPL-MCNC: 137 MG/DL (ref 70–99)
GLUCOSE UR STRIP-MCNC: NEGATIVE MG/DL
HCO3 BLDV-SCNC: 26 MMOL/L (ref 21–28)
HCT VFR BLD AUTO: 45.1 % (ref 40–53)
HGB BLD-MCNC: 15.6 G/DL (ref 13.3–17.7)
HGB UR QL STRIP: ABNORMAL
IMM GRANULOCYTES # BLD: 0.1 10E3/UL
IMM GRANULOCYTES NFR BLD: 0 %
KETONES UR STRIP-MCNC: NEGATIVE MG/DL
LACTATE SERPL-SCNC: 1.7 MMOL/L (ref 0.7–2)
LEUKOCYTE ESTERASE UR QL STRIP: ABNORMAL
LYMPHOCYTES # BLD AUTO: 2.4 10E3/UL (ref 0.8–5.3)
LYMPHOCYTES NFR BLD AUTO: 14 %
MCH RBC QN AUTO: 29.5 PG (ref 26.5–33)
MCHC RBC AUTO-ENTMCNC: 34.6 G/DL (ref 31.5–36.5)
MCV RBC AUTO: 85 FL (ref 78–100)
MONOCYTES # BLD AUTO: 1.4 10E3/UL (ref 0–1.3)
MONOCYTES NFR BLD AUTO: 8 %
MUCOUS THREADS #/AREA URNS LPF: PRESENT /LPF
NEUTROPHILS # BLD AUTO: 12.5 10E3/UL (ref 1.6–8.3)
NEUTROPHILS NFR BLD AUTO: 75 %
NITRATE UR QL: NEGATIVE
NRBC # BLD AUTO: 0 10E3/UL
NRBC BLD AUTO-RTO: 0 /100
O2/TOTAL GAS SETTING VFR VENT: 21 %
OXYHGB MFR BLDV: 86 % (ref 70–75)
PCO2 BLDV: 39 MM HG (ref 40–50)
PH BLDV: 7.43 [PH] (ref 7.32–7.43)
PH UR STRIP: 6 [PH] (ref 5–7)
PLATELET # BLD AUTO: 293 10E3/UL (ref 150–450)
PO2 BLDV: 45 MM HG (ref 25–47)
POTASSIUM SERPL-SCNC: 3.4 MMOL/L (ref 3.4–5.3)
PROT SERPL-MCNC: 8.7 G/DL (ref 6.4–8.3)
RBC # BLD AUTO: 5.28 10E6/UL (ref 4.4–5.9)
RBC URINE: 2 /HPF
SAO2 % BLDV: 87.8 % (ref 70–75)
SODIUM SERPL-SCNC: 137 MMOL/L (ref 135–145)
SP GR UR STRIP: 1.02 (ref 1–1.03)
UROBILINOGEN UR STRIP-MCNC: 2 MG/DL
WBC # BLD AUTO: 16.7 10E3/UL (ref 4–11)
WBC URINE: 172 /HPF

## 2024-06-22 PROCEDURE — 258N000003 HC RX IP 258 OP 636: Mod: JZ | Performed by: EMERGENCY MEDICINE

## 2024-06-22 PROCEDURE — 120N000001 HC R&B MED SURG/OB

## 2024-06-22 PROCEDURE — 74177 CT ABD & PELVIS W/CONTRAST: CPT

## 2024-06-22 PROCEDURE — 83605 ASSAY OF LACTIC ACID: CPT | Performed by: EMERGENCY MEDICINE

## 2024-06-22 PROCEDURE — 80053 COMPREHEN METABOLIC PANEL: CPT | Performed by: EMERGENCY MEDICINE

## 2024-06-22 PROCEDURE — 96375 TX/PRO/DX INJ NEW DRUG ADDON: CPT

## 2024-06-22 PROCEDURE — 250N000011 HC RX IP 250 OP 636: Performed by: EMERGENCY MEDICINE

## 2024-06-22 PROCEDURE — 250N000013 HC RX MED GY IP 250 OP 250 PS 637: Performed by: STUDENT IN AN ORGANIZED HEALTH CARE EDUCATION/TRAINING PROGRAM

## 2024-06-22 PROCEDURE — 76870 US EXAM SCROTUM: CPT

## 2024-06-22 PROCEDURE — 93976 VASCULAR STUDY: CPT

## 2024-06-22 PROCEDURE — 81001 URINALYSIS AUTO W/SCOPE: CPT | Performed by: EMERGENCY MEDICINE

## 2024-06-22 PROCEDURE — 87040 BLOOD CULTURE FOR BACTERIA: CPT | Performed by: EMERGENCY MEDICINE

## 2024-06-22 PROCEDURE — 87086 URINE CULTURE/COLONY COUNT: CPT | Performed by: EMERGENCY MEDICINE

## 2024-06-22 PROCEDURE — 87186 SC STD MICRODIL/AGAR DIL: CPT | Performed by: EMERGENCY MEDICINE

## 2024-06-22 PROCEDURE — 85025 COMPLETE CBC W/AUTO DIFF WBC: CPT | Performed by: EMERGENCY MEDICINE

## 2024-06-22 PROCEDURE — 96365 THER/PROPH/DIAG IV INF INIT: CPT | Mod: 59

## 2024-06-22 PROCEDURE — 96366 THER/PROPH/DIAG IV INF ADDON: CPT

## 2024-06-22 PROCEDURE — 99223 1ST HOSP IP/OBS HIGH 75: CPT | Performed by: STUDENT IN AN ORGANIZED HEALTH CARE EDUCATION/TRAINING PROGRAM

## 2024-06-22 PROCEDURE — 36415 COLL VENOUS BLD VENIPUNCTURE: CPT | Performed by: EMERGENCY MEDICINE

## 2024-06-22 PROCEDURE — 82805 BLOOD GASES W/O2 SATURATION: CPT | Performed by: EMERGENCY MEDICINE

## 2024-06-22 PROCEDURE — 96361 HYDRATE IV INFUSION ADD-ON: CPT

## 2024-06-22 PROCEDURE — 99285 EMERGENCY DEPT VISIT HI MDM: CPT | Mod: 25

## 2024-06-22 RX ORDER — OXYCODONE HYDROCHLORIDE 5 MG/1
5 TABLET ORAL EVERY 4 HOURS PRN
Status: DISCONTINUED | OUTPATIENT
Start: 2024-06-22 | End: 2024-06-26 | Stop reason: HOSPADM

## 2024-06-22 RX ORDER — CEFTRIAXONE 1 G/1
1 INJECTION, POWDER, FOR SOLUTION INTRAMUSCULAR; INTRAVENOUS ONCE
Status: COMPLETED | OUTPATIENT
Start: 2024-06-22 | End: 2024-06-22

## 2024-06-22 RX ORDER — AMOXICILLIN 250 MG
1 CAPSULE ORAL 2 TIMES DAILY PRN
Status: DISCONTINUED | OUTPATIENT
Start: 2024-06-22 | End: 2024-06-26 | Stop reason: HOSPADM

## 2024-06-22 RX ORDER — NALOXONE HYDROCHLORIDE 0.4 MG/ML
0.2 INJECTION, SOLUTION INTRAMUSCULAR; INTRAVENOUS; SUBCUTANEOUS
Status: DISCONTINUED | OUTPATIENT
Start: 2024-06-22 | End: 2024-06-26 | Stop reason: HOSPADM

## 2024-06-22 RX ORDER — LIDOCAINE 40 MG/G
CREAM TOPICAL
Status: DISCONTINUED | OUTPATIENT
Start: 2024-06-22 | End: 2024-06-26 | Stop reason: HOSPADM

## 2024-06-22 RX ORDER — IOPAMIDOL 755 MG/ML
70 INJECTION, SOLUTION INTRAVASCULAR ONCE
Status: COMPLETED | OUTPATIENT
Start: 2024-06-22 | End: 2024-06-22

## 2024-06-22 RX ORDER — AMOXICILLIN 250 MG
2 CAPSULE ORAL 2 TIMES DAILY PRN
Status: DISCONTINUED | OUTPATIENT
Start: 2024-06-22 | End: 2024-06-26 | Stop reason: HOSPADM

## 2024-06-22 RX ORDER — KETOROLAC TROMETHAMINE 15 MG/ML
10 INJECTION, SOLUTION INTRAMUSCULAR; INTRAVENOUS ONCE
Status: COMPLETED | OUTPATIENT
Start: 2024-06-22 | End: 2024-06-22

## 2024-06-22 RX ORDER — AMLODIPINE BESYLATE 5 MG/1
5 TABLET ORAL DAILY
Status: DISCONTINUED | OUTPATIENT
Start: 2024-06-23 | End: 2024-06-26 | Stop reason: HOSPADM

## 2024-06-22 RX ORDER — ACETAMINOPHEN 325 MG/1
975 TABLET ORAL 3 TIMES DAILY PRN
Status: DISCONTINUED | OUTPATIENT
Start: 2024-06-22 | End: 2024-06-26 | Stop reason: HOSPADM

## 2024-06-22 RX ORDER — LEVOTHYROXINE SODIUM 25 UG/1
25 TABLET ORAL DAILY
Status: DISCONTINUED | OUTPATIENT
Start: 2024-06-23 | End: 2024-06-26 | Stop reason: HOSPADM

## 2024-06-22 RX ORDER — MORPHINE SULFATE 2 MG/ML
2 INJECTION, SOLUTION INTRAMUSCULAR; INTRAVENOUS
Status: DISCONTINUED | OUTPATIENT
Start: 2024-06-22 | End: 2024-06-22

## 2024-06-22 RX ORDER — CEFAZOLIN SODIUM 1 G/50ML
1250 SOLUTION INTRAVENOUS EVERY 24 HOURS
Status: DISCONTINUED | OUTPATIENT
Start: 2024-06-23 | End: 2024-06-23

## 2024-06-22 RX ORDER — AMLODIPINE BESYLATE 5 MG/1
5 TABLET ORAL DAILY
COMMUNITY
Start: 2024-06-15

## 2024-06-22 RX ORDER — CALCIUM CARBONATE 500 MG/1
1000 TABLET, CHEWABLE ORAL 4 TIMES DAILY PRN
Status: DISCONTINUED | OUTPATIENT
Start: 2024-06-22 | End: 2024-06-26 | Stop reason: HOSPADM

## 2024-06-22 RX ORDER — PANTOPRAZOLE SODIUM 40 MG/1
40 TABLET, DELAYED RELEASE ORAL
Status: DISCONTINUED | OUTPATIENT
Start: 2024-06-23 | End: 2024-06-26 | Stop reason: HOSPADM

## 2024-06-22 RX ORDER — ARIPIPRAZOLE 5 MG/1
5 TABLET ORAL DAILY
Status: DISCONTINUED | OUTPATIENT
Start: 2024-06-23 | End: 2024-06-26 | Stop reason: HOSPADM

## 2024-06-22 RX ORDER — ONDANSETRON 2 MG/ML
4 INJECTION INTRAMUSCULAR; INTRAVENOUS EVERY 6 HOURS PRN
Status: DISCONTINUED | OUTPATIENT
Start: 2024-06-22 | End: 2024-06-26 | Stop reason: HOSPADM

## 2024-06-22 RX ORDER — NALOXONE HYDROCHLORIDE 0.4 MG/ML
0.4 INJECTION, SOLUTION INTRAMUSCULAR; INTRAVENOUS; SUBCUTANEOUS
Status: DISCONTINUED | OUTPATIENT
Start: 2024-06-22 | End: 2024-06-26 | Stop reason: HOSPADM

## 2024-06-22 RX ORDER — POLYETHYLENE GLYCOL 3350 17 G/17G
17 POWDER, FOR SOLUTION ORAL 2 TIMES DAILY PRN
Status: DISCONTINUED | OUTPATIENT
Start: 2024-06-22 | End: 2024-06-26 | Stop reason: HOSPADM

## 2024-06-22 RX ORDER — ONDANSETRON 4 MG/1
4 TABLET, ORALLY DISINTEGRATING ORAL EVERY 6 HOURS PRN
Status: DISCONTINUED | OUTPATIENT
Start: 2024-06-22 | End: 2024-06-26 | Stop reason: HOSPADM

## 2024-06-22 RX ORDER — FERROUS GLUCONATE 324(38)MG
1 TABLET ORAL AT BEDTIME
COMMUNITY
Start: 2024-05-03

## 2024-06-22 RX ORDER — TRAZODONE HYDROCHLORIDE 50 MG/1
50 TABLET, FILM COATED ORAL AT BEDTIME
Status: DISCONTINUED | OUTPATIENT
Start: 2024-06-22 | End: 2024-06-26 | Stop reason: HOSPADM

## 2024-06-22 RX ORDER — ENOXAPARIN SODIUM 100 MG/ML
40 INJECTION SUBCUTANEOUS EVERY 24 HOURS
Status: DISCONTINUED | OUTPATIENT
Start: 2024-06-23 | End: 2024-06-26 | Stop reason: HOSPADM

## 2024-06-22 RX ORDER — SIMVASTATIN 10 MG
10 TABLET ORAL AT BEDTIME
Status: DISCONTINUED | OUTPATIENT
Start: 2024-06-22 | End: 2024-06-26 | Stop reason: HOSPADM

## 2024-06-22 RX ORDER — CEFTRIAXONE 2 G/1
2 INJECTION, POWDER, FOR SOLUTION INTRAMUSCULAR; INTRAVENOUS EVERY 24 HOURS
Status: DISCONTINUED | OUTPATIENT
Start: 2024-06-23 | End: 2024-06-24

## 2024-06-22 RX ADMIN — SIMVASTATIN 10 MG: 10 TABLET, FILM COATED ORAL at 21:56

## 2024-06-22 RX ADMIN — IOPAMIDOL 70 ML: 755 INJECTION, SOLUTION INTRAVENOUS at 16:26

## 2024-06-22 RX ADMIN — MORPHINE SULFATE 2 MG: 2 INJECTION, SOLUTION INTRAMUSCULAR; INTRAVENOUS at 20:44

## 2024-06-22 RX ADMIN — TRAZODONE HYDROCHLORIDE 50 MG: 50 TABLET ORAL at 21:56

## 2024-06-22 RX ADMIN — MORPHINE SULFATE 2 MG: 2 INJECTION, SOLUTION INTRAMUSCULAR; INTRAVENOUS at 18:23

## 2024-06-22 RX ADMIN — SODIUM CHLORIDE 2000 ML: 9 INJECTION, SOLUTION INTRAVENOUS at 15:25

## 2024-06-22 RX ADMIN — SODIUM CHLORIDE 1500 MG: 9 INJECTION, SOLUTION INTRAVENOUS at 17:12

## 2024-06-22 RX ADMIN — CEFTRIAXONE SODIUM 1 G: 1 INJECTION, POWDER, FOR SOLUTION INTRAMUSCULAR; INTRAVENOUS at 16:29

## 2024-06-22 RX ADMIN — KETOROLAC TROMETHAMINE 10 MG: 15 INJECTION, SOLUTION INTRAMUSCULAR; INTRAVENOUS at 15:22

## 2024-06-22 ASSESSMENT — ACTIVITIES OF DAILY LIVING (ADL)
ADLS_ACUITY_SCORE: 38
WEAR_GLASSES_OR_BLIND: NO
DIFFICULTY_COMMUNICATING: NO
ADLS_ACUITY_SCORE: 38
ADLS_ACUITY_SCORE: 23
ADLS_ACUITY_SCORE: 38
HEARING_DIFFICULTY_OR_DEAF: NO
DRESSING/BATHING_DIFFICULTY: NO
ADLS_ACUITY_SCORE: 23
WALKING_OR_CLIMBING_STAIRS_DIFFICULTY: NO
CHANGE_IN_FUNCTIONAL_STATUS_SINCE_ONSET_OF_CURRENT_ILLNESS/INJURY: NO
DOING_ERRANDS_INDEPENDENTLY_DIFFICULTY: YES
TOILETING_ISSUES: NO
ADLS_ACUITY_SCORE: 23
DIFFICULTY_EATING/SWALLOWING: NO
ADLS_ACUITY_SCORE: 36
DEPENDENT_IADLS:: CLEANING;COOKING;LAUNDRY;SHOPPING;MEAL PREPARATION;MEDICATION MANAGEMENT;MONEY MANAGEMENT;TRANSPORTATION
EQUIPMENT_CURRENTLY_USED_AT_HOME: CANE, STRAIGHT;WALKER, ROLLING
FALL_HISTORY_WITHIN_LAST_SIX_MONTHS: NO
ADLS_ACUITY_SCORE: 38
ADLS_ACUITY_SCORE: 38
CONCENTRATING,_REMEMBERING_OR_MAKING_DECISIONS_DIFFICULTY: NO

## 2024-06-22 ASSESSMENT — COLUMBIA-SUICIDE SEVERITY RATING SCALE - C-SSRS
2. HAVE YOU ACTUALLY HAD ANY THOUGHTS OF KILLING YOURSELF IN THE PAST MONTH?: NO
1. IN THE PAST MONTH, HAVE YOU WISHED YOU WERE DEAD OR WISHED YOU COULD GO TO SLEEP AND NOT WAKE UP?: NO
6. HAVE YOU EVER DONE ANYTHING, STARTED TO DO ANYTHING, OR PREPARED TO DO ANYTHING TO END YOUR LIFE?: NO
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
## 2024-06-22 NOTE — ED TRIAGE NOTES
"Patient arrives to triage from home with family with chief complaint of left testicular pain for the past three days.  Patient does have swelling present compared to right side and left testicle is hard and painful to touch.  Patient also complaining of \"bladder\" pain and back pain.  Has suprapubic catheter present.  Alert and oriented x4.         "

## 2024-06-23 LAB
ANION GAP SERPL CALCULATED.3IONS-SCNC: 11 MMOL/L (ref 7–15)
BUN SERPL-MCNC: 8.6 MG/DL (ref 8–23)
CALCIUM SERPL-MCNC: 8.6 MG/DL (ref 8.8–10.2)
CHLORIDE SERPL-SCNC: 106 MMOL/L (ref 98–107)
CREAT SERPL-MCNC: 0.98 MG/DL (ref 0.67–1.17)
CRP SERPL-MCNC: 126 MG/L
DEPRECATED HCO3 PLAS-SCNC: 22 MMOL/L (ref 22–29)
EGFRCR SERPLBLD CKD-EPI 2021: 85 ML/MIN/1.73M2
ERYTHROCYTE [DISTWIDTH] IN BLOOD BY AUTOMATED COUNT: 14.4 % (ref 10–15)
GLUCOSE SERPL-MCNC: 103 MG/DL (ref 70–99)
HCT VFR BLD AUTO: 38.3 % (ref 40–53)
HGB BLD-MCNC: 13 G/DL (ref 13.3–17.7)
MCH RBC QN AUTO: 29.1 PG (ref 26.5–33)
MCHC RBC AUTO-ENTMCNC: 33.9 G/DL (ref 31.5–36.5)
MCV RBC AUTO: 86 FL (ref 78–100)
PLATELET # BLD AUTO: 288 10E3/UL (ref 150–450)
POTASSIUM SERPL-SCNC: 3.2 MMOL/L (ref 3.4–5.3)
POTASSIUM SERPL-SCNC: 4.1 MMOL/L (ref 3.4–5.3)
RBC # BLD AUTO: 4.46 10E6/UL (ref 4.4–5.9)
SODIUM SERPL-SCNC: 139 MMOL/L (ref 135–145)
WBC # BLD AUTO: 14.2 10E3/UL (ref 4–11)

## 2024-06-23 PROCEDURE — 86140 C-REACTIVE PROTEIN: CPT | Performed by: INTERNAL MEDICINE

## 2024-06-23 PROCEDURE — 250N000011 HC RX IP 250 OP 636: Mod: JZ | Performed by: STUDENT IN AN ORGANIZED HEALTH CARE EDUCATION/TRAINING PROGRAM

## 2024-06-23 PROCEDURE — 36415 COLL VENOUS BLD VENIPUNCTURE: CPT | Performed by: STUDENT IN AN ORGANIZED HEALTH CARE EDUCATION/TRAINING PROGRAM

## 2024-06-23 PROCEDURE — 250N000013 HC RX MED GY IP 250 OP 250 PS 637: Performed by: HOSPITALIST

## 2024-06-23 PROCEDURE — 99222 1ST HOSP IP/OBS MODERATE 55: CPT | Performed by: INTERNAL MEDICINE

## 2024-06-23 PROCEDURE — 85027 COMPLETE CBC AUTOMATED: CPT | Performed by: STUDENT IN AN ORGANIZED HEALTH CARE EDUCATION/TRAINING PROGRAM

## 2024-06-23 PROCEDURE — 36415 COLL VENOUS BLD VENIPUNCTURE: CPT | Performed by: HOSPITALIST

## 2024-06-23 PROCEDURE — 84132 ASSAY OF SERUM POTASSIUM: CPT | Performed by: HOSPITALIST

## 2024-06-23 PROCEDURE — 250N000013 HC RX MED GY IP 250 OP 250 PS 637: Performed by: STUDENT IN AN ORGANIZED HEALTH CARE EDUCATION/TRAINING PROGRAM

## 2024-06-23 PROCEDURE — 120N000001 HC R&B MED SURG/OB

## 2024-06-23 PROCEDURE — 99232 SBSQ HOSP IP/OBS MODERATE 35: CPT | Performed by: HOSPITALIST

## 2024-06-23 PROCEDURE — 80048 BASIC METABOLIC PNL TOTAL CA: CPT | Performed by: STUDENT IN AN ORGANIZED HEALTH CARE EDUCATION/TRAINING PROGRAM

## 2024-06-23 RX ORDER — POTASSIUM CHLORIDE 1500 MG/1
40 TABLET, EXTENDED RELEASE ORAL ONCE
Status: COMPLETED | OUTPATIENT
Start: 2024-06-23 | End: 2024-06-23

## 2024-06-23 RX ADMIN — SENNOSIDES AND DOCUSATE SODIUM 2 TABLET: 50; 8.6 TABLET ORAL at 13:50

## 2024-06-23 RX ADMIN — SIMVASTATIN 10 MG: 10 TABLET, FILM COATED ORAL at 21:08

## 2024-06-23 RX ADMIN — POLYETHYLENE GLYCOL 3350 17 G: 17 POWDER, FOR SOLUTION ORAL at 13:50

## 2024-06-23 RX ADMIN — ENOXAPARIN SODIUM 40 MG: 40 INJECTION SUBCUTANEOUS at 08:14

## 2024-06-23 RX ADMIN — LEVOTHYROXINE SODIUM 25 MCG: 0.03 TABLET ORAL at 06:22

## 2024-06-23 RX ADMIN — AMLODIPINE BESYLATE 5 MG: 5 TABLET ORAL at 08:14

## 2024-06-23 RX ADMIN — CEFTRIAXONE SODIUM 2 G: 2 INJECTION, POWDER, FOR SOLUTION INTRAMUSCULAR; INTRAVENOUS at 11:56

## 2024-06-23 RX ADMIN — PANTOPRAZOLE SODIUM 40 MG: 40 TABLET, DELAYED RELEASE ORAL at 06:22

## 2024-06-23 RX ADMIN — ARIPIPRAZOLE 5 MG: 5 TABLET ORAL at 08:14

## 2024-06-23 RX ADMIN — OXYCODONE HYDROCHLORIDE 5 MG: 5 TABLET ORAL at 02:19

## 2024-06-23 RX ADMIN — POTASSIUM CHLORIDE 40 MEQ: 1500 TABLET, EXTENDED RELEASE ORAL at 10:07

## 2024-06-23 RX ADMIN — FLUOXETINE HYDROCHLORIDE 20 MG: 20 CAPSULE ORAL at 08:14

## 2024-06-23 RX ADMIN — TRAZODONE HYDROCHLORIDE 50 MG: 50 TABLET ORAL at 21:08

## 2024-06-23 RX ADMIN — ACETAMINOPHEN 975 MG: 325 TABLET ORAL at 08:14

## 2024-06-23 ASSESSMENT — ACTIVITIES OF DAILY LIVING (ADL)
ADLS_ACUITY_SCORE: 23

## 2024-06-23 NOTE — CONSULTS
Consultation - INFECTIOUS DISEASE CONSULTATION  Rere Robbins ,  1955, MRN [id number]      Orchitis and epididymitis [N45.3]  History of diabetes mellitus [Z86.39]  Sepsis, due to unspecified organism, unspecified whether acute organ dysfunction present (H) [A41.9]    PCP: Coty Calix, [phone]   Code status:  Full Code               Assessment:  L orchitis: testicular US with orchitis. Clinically improving. Leukocytosis better. Urology following.  Suprapubic catheter: UA difficult to interpret in this setting.     Active Problems:    Orchitis and epididymitis    History of diabetes mellitus    Sepsis, due to unspecified organism, unspecified whether acute organ dysfunction present (H)      Recommendations:   - ceftriaxone IV  - trend labs, follow cultures, follow clinicallly  - consider repeat imaging if no additional improvement  - further recommendations to follow clinical course  - ID will follow, thanks    Archana Bui MD  [facility]  Office Telephone [phone].  Fax [phone]  [facility] paging      HPI:    Rere Robbins is a 69 year old male. History is provided by patient and chart.    Presented on  with 2 days of progressive left testicle pain, swelling and left groin pain. No fevers but had chills. In ED, leukocytosis. Testicular US with L orchitis. On ceftriaxone and vanc.  Today, he is feeling better. Pain in the groin has resolved, improved in testicle. No more fevers or chills. Tolerating PO. Unsure when last suprapubic catheter exchange.     Due to language barrier, a phone  was present during the history-taking and subsequent discussion (and for part of the physical exam) with this patient.      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    Urinary retention 11/27/2023    Joseluis hematuria 11/27/2023    MAUREEN (acute kidney injury) (H24) 11/28/2023    Severe sepsis (H) 11/28/2023    Lactic acidosis 11/28/2023    Acquired hypothyroidism 12/03/2023    Gout 12/03/2023    History of vitamin D deficiency 12/03/2023    Hx of psychosis 12/03/2023    Hyperlipidemia 12/03/2023    Memory impairment 12/03/2023    PTSD (post-traumatic stress disorder) 12/03/2023    Recurrent falls 12/03/2023    Recurrent major depression in full remission (H24) 12/03/2023    Anemia due to blood loss, acute 12/03/2023     Resolved Ambulatory Problems     Diagnosis Date Noted    No Resolved Ambulatory Problems     Past Medical History:   Diagnosis Date    Acute idiopathic gout of right ankle 12/3/2023         Surgical History  He  has a past surgical history that includes Abdomen surgery; Cystoscopy, fulgurate bleeders, evacuate clot(s), combined (N/A, 11/28/2023); and Cystoscopy, fulgurate bleeders, evacuate clot(s), combined (N/A, 11/29/2023).       Social History  Reviewed, and he  reports that he quit smoking about 5 years ago. He started smoking about 55 years ago. He has quit using smokeless tobacco.  His smokeless tobacco use included chew. He reports that he does not drink alcohol and does not use drugs.        Family History  Reviewed and noncontributory to present problem, and family history includes No Known Problems in his brother, father, mother, and sister.    Psychosocial Needs  Social History     Social History Narrative    ** Merged History Encounter **          Additional psychosocial needs reviewed per nursing assessment.       No Known Allergies   Medications Prior to Admission   Medication Sig Dispense Refill Last Dose    acetaminophen (TYLENOL) 650 MG CR tablet  Take 1,300 mg by mouth every 8 hours as needed   Unknown at prn    amLODIPine (NORVASC) 5 MG tablet Take 5 mg by mouth daily   6/22/2024 at am    ARIPiprazole (ABILIFY) 5 MG tablet Take 5 mg by mouth daily   6/22/2024 at am    diclofenac (VOLTAREN) 1 % topical gel Apply 2 g topically 4 times daily as needed for moderate pain (right knee)   Unknown at prn    ferrous gluconate (FERGON) 324 (38 Fe) MG tablet Take 1 tablet by mouth at bedtime   6/21/2024 at pm    fish oil-omega-3 fatty acids 1000 MG capsule Take 2 g by mouth daily   6/22/2024 at am    FLUoxetine (PROZAC) 20 MG capsule Take 20 mg by mouth daily   6/22/2024 at am    levothyroxine (SYNTHROID, LEVOTHROID) 25 MCG tablet [LEVOTHYROXINE (SYNTHROID, LEVOTHROID) 25 MCG TABLET] Take 1 tablet by mouth daily.   6/22/2024 at am    multivitamin (ONE A DAY) per tablet Take 1 tablet by mouth daily   6/22/2024 at am    omeprazole (PRILOSEC) 20 MG capsule Take 20 mg by mouth daily   6/22/2024 at am    simvastatin (ZOCOR) 10 MG tablet Take 10 mg by mouth at bedtime   6/21/2024 at pm    traZODone (DESYREL) 50 MG tablet Take  mg by mouth at bedtime   6/21/2024 at pm    VITAMIN D3 2,000 unit capsule Take 4,000 Units by mouth daily  3 6/22/2024 at am        Review of Systems:  A 12 point comprehensive review of systems was negative except as noted. Physical Exam:  Temp:  [97.7  F (36.5  C)-99.6  F (37.6  C)] 98.2  F (36.8  C)  Pulse:  [] 79  Resp:  [15-28] 25  BP: (106-129)/(60-89) 106/60  SpO2:  [92 %-99 %] 94 %    GEN: alert and oriented x3, NAD  HEAD: atraumatic  ENT: moist membranes, no thrush, anicteric sclera  NECK: supple, no nuchal rigidity  CARDIOVASCULAR: regular rate and rhythm, no murmurs, rubs, or gallops  PULMONARY: lungs clear to ausculation bilaterally  ABDOMEN: soft, nontender, nondistended. Normal bowel sounds. Suprapubic.   SKIN: no rashes or lesions. No stigma of endocarditis  PSYCH: grossly intact  MUSCULOSKELETAL: no synovitis  : left  testicle swelling, TTP. Not much TTP into groin.                Pertinent Labs  personally reviewed.   CBC RESULTS:   Recent Labs   Lab Test 06/23/24  0725   WBC 14.2*   RBC 4.46   HGB 13.0*   HCT 38.3*   MCV 86   MCH 29.1   MCHC 33.9   RDW 14.4           Last Comprehensive Metabolic Panel:  Sodium   Date Value Ref Range Status   06/23/2024 139 135 - 145 mmol/L Final     Comment:     Reference intervals for this test were updated on 09/26/2023 to more accurately reflect our healthy population. There may be differences in the flagging of prior results with similar values performed with this method. Interpretation of those prior results can be made in the context of the updated reference intervals.    07/26/2016 137.8 132.0 - 142.0 mmol/L Final     Potassium   Date Value Ref Range Status   06/23/2024 3.2 (L) 3.4 - 5.3 mmol/L Final   09/14/2021 4.0 3.5 - 5.0 mmol/L Final   07/26/2016 3.5 3.2 - 4.6 mmol/dL Final     Chloride   Date Value Ref Range Status   06/23/2024 106 98 - 107 mmol/L Final   09/14/2021 107 98 - 107 mmol/L Final   07/26/2016 102.2 98.0 - 110.0 mmol/L Final     Carbon Dioxide   Date Value Ref Range Status   07/26/2016 25.7 20.0 - 32.0 mmol/L Final     Carbon Dioxide (CO2)   Date Value Ref Range Status   06/23/2024 22 22 - 29 mmol/L Final   09/14/2021 24 22 - 31 mmol/L Final     Anion Gap   Date Value Ref Range Status   06/23/2024 11 7 - 15 mmol/L Final   09/14/2021 9 5 - 18 mmol/L Final     Glucose   Date Value Ref Range Status   06/23/2024 103 (H) 70 - 99 mg/dL Final   09/14/2021 100 70 - 125 mg/dL Final   07/26/2016 120.1 (H) 70.0 - 99.0 mg'dL Final     Urea Nitrogen   Date Value Ref Range Status   06/23/2024 8.6 8.0 - 23.0 mg/dL Final   09/14/2021 11 8 - 22 mg/dL Final   07/26/2016 11.6 7.0 - 21.0 mg/dL Final     Creatinine   Date Value Ref Range Status   06/23/2024 0.98 0.67 - 1.17 mg/dL Final   07/26/2016 0.8 0.7 - 1.3 mg/dL Final     GFR Estimate   Date Value Ref Range Status   06/23/2024  "85 >60 mL/min/1.73m2 Final     Comment:     eGFR calculated using 2021 CKD-EPI equation.   07/15/2020 >60 >60 mL/min/1.73m2 Final   07/26/2016 105.2 mL/min/1.7 m2 Final     Calcium   Date Value Ref Range Status   06/23/2024 8.6 (L) 8.8 - 10.2 mg/dL Final   07/26/2016 9.6 8.5 - 10.1 mg/dL Final       No results found for: \"CRP\"     The following microbiology studies were personally reviewed:  No results found for: \"CULT\"    Urine Studies    Recent Labs   Lab Test 06/22/24  1512 11/28/23  0859   LEUKEST 500 Zeeshan/uL* 250 Zeeshan/uL*   WBCU 172* 66*       Vancomycin Levels  No lab results found.    Invalid input(s): \"VANCO\"    MICROBIOLOGY DATA:    All cultures:  7-Day Micro Results       Collected Updated Procedure Result Status      06/22/2024 1537 06/23/2024 0701 Blood Culture Hand, Left [[id number]]   Blood from Hand, Left    Preliminary result Component Value   Culture No growth after 12 hours  [P]                06/22/2024 1512 06/22/2024 1549 Urine Culture [[id number]]   Urine, Catheter    In process Component Value   No component results               06/22/2024 1511 06/23/2024 0701 Blood Culture Peripheral Blood [[id number]]   Peripheral Blood    Preliminary result Component Value   Culture No growth after 12 hours  [P]                         Pertinent Radiology  personally reviewed.     US Testicular & Scrotum w Doppler Ltd    Result Date: 6/22/2024  EXAM: US TESTICULAR AND SCROTUM WITH DOPPLER LIMITED LOCATION: [facility] DATE: 6/22/2024 INDICATION: Left testicular swelling pain radiating into back. Suprapubic catheter in place. COMPARISON: Previous ultrasound 11/29/2023. TECHNIQUE: Ultrasound of scrotum with color flow and spectral Doppler with waveform analysis performed. FINDINGS: RIGHT: Right testicle measures 3.7 x 1.4 x 2.7 cm. Heterogeneity of the testicular echotexture. There is a new 2 mm cyst in the testicle. No solid masses. Normal vascularity. 3 mm epididymal head cyst. " Normal vascularity. No hydrocele. No varicocele. LEFT: Left testicle measures 3.7 x 2.8 x 2.4 cm. Heterogeneous echotexture of the testicle with increased vascularity suggesting orchitis. No evidence for mass. Normal epididymis. Small hydrocele. No varicocele.     IMPRESSION: 1.  There is heterogeneity of the left testicular echotexture with increased vascularity suggesting orchitis. Small likely reactive left hydrocele. 2.  Small cysts within the right epididymal head and right testicle. Remainder unremarkable.    CT Abdomen Pelvis w Contrast    Result Date: 6/22/2024  EXAM: CT ABDOMEN PELVIS W CONTRAST LOCATION: [facility] DATE: 6/22/2024 INDICATION: Testicle pain radiating into the lower back with suprapubic TTP.  Has suprapubic catheter for history of proximal penile injury with stricture. COMPARISON: CT 11/29/2023, 11/27/2023 and 4/29/2019 TECHNIQUE: CT scan of the abdomen and pelvis was performed following injection of IV contrast. Multiplanar reformats were obtained. Dose reduction techniques were used. CONTRAST: Isovue 370  70ml FINDINGS: LOWER CHEST: 6 mm nodule right middle lobe. This was present on the prior study of 11/27/2023, however, the exams prior to this did not include this region of the lung therefore long-term stability cannot be confirmed. HEPATOBILIARY: Fatty infiltration of the liver. The gallbladder is contracted. No biliary dilatation. PANCREAS: Normal. SPLEEN: Normal. ADRENAL GLANDS: Normal. KIDNEYS/BLADDER: Bilateral renal cysts require no specific follow-up. No hydronephrosis or kidney stones. There is a suprapubic Chase catheter within the decompressed urinary bladder. No surrounding fluid collections or inflammation. BOWEL: Normal. LYMPH NODES: Normal. VASCULATURE: Normal. PELVIC ORGANS: Mild prostate gland enlargement. MUSCULOSKELETAL: Multiple old fractures of the pelvic bones.     IMPRESSION: 1.  There are no kidney stones or hydronephrosis. Bilateral renal  cysts require no specific follow-up. 2.  Suprapubic Chase catheter within the decompressed urinary bladder. No complications visualized. 3.  There is no evidence for bowel obstruction or inflammation. 4.  Hepatic steatosis. 5.  Multiple old complex fractures involving the pelvic bones. 6.  6 mm nodule right middle lobe is stable compared to the most recent study however older studies to confirm long-term stability. Recommend a follow-up chest CT in 6 months.

## 2024-06-23 NOTE — PHARMACY-VANCOMYCIN DOSING SERVICE
Pharmacy Vancomycin Initial Note  Date of Service 2024  Patient's  1955  69 year old, male    Indication: Urinary Tract Infection and orchitis (Sp tube in place)    Current estimated CrCl = Estimated Creatinine Clearance: 52.3 mL/min (based on SCr of 1.15 mg/dL).    Creatinine for last 3 days  2024:  3:11 PM Creatinine 1.15 mg/dL    Recent Vancomycin Level(s) for last 3 days  No results found for requested labs within last 3 days.      Vancomycin IV Administrations (past 72 hours)                     vancomycin (VANCOCIN) 1,500 mg in sodium chloride 0.9 % 250 mL intermittent infusion (mg) 1,500 mg New Bag 24 1712                    Nephrotoxins and other renal medications (From now, onward)      Start     Dose/Rate Route Frequency Ordered Stop    24 1700  vancomycin (VANCOCIN) 1,250 mg in sodium chloride 0.9 % 250 mL intermittent infusion         1,250 mg  over 90 Minutes Intravenous EVERY 24 HOURS 24 2135              Contrast Orders - past 72 hours (72h ago, onward)      Start     Dose/Rate Route Frequency Stop    24 1630  iopamidol (ISOVUE-370) solution 70 mL         70 mL Intravenous ONCE 24 1626            [employer] Prediction of Planned Initial Vancomycin Regimen  Regimen: 1250 mg IV every 24 hours.  Start time: 05:12 on 2024  Exposure target: AUC24 (range)400-600 mg/L.hr   AUC24,ss: 485 mg/L.hr  Probability of AUC24 > 400: 72 %  Ctrough,ss: 13.9 mg/L  Probability of Ctrough,ss > 20: 20 %  Probability of nephrotoxicity (Lodise YANA ): 9 %          Plan:  Start vancomycin  1250 mg IV q24h.   Vancomycin monitoring method: AUC  Vancomycin therapeutic monitoring goal: 400-600 mg*h/L  Pharmacy will check vancomycin levels as appropriate in 1-3 Days.      Maria R Bray [facility]

## 2024-06-23 NOTE — ED NOTES
"[facility] ED Handoff Report    ED Chief Complaint: testicular pain    ED Diagnosis:  (A41.9) Sepsis, due to unspecified organism, unspecified whether acute organ dysfunction present (H)  (primary encounter diagnosis)    (N45.3) Orchitis and epididymitis    (Z86.39) History of diabetes mellitus       PMH:    Past Medical History:   Diagnosis Date    Acquired hypothyroidism 12/3/2023    Acute idiopathic gout of right ankle 12/3/2023        Code Status:  Prior     Falls Risk: No Band: Not applicable    Current Living Situation/Residence: lives with granddaughter     Elimination Status: Continent: Has suprapubic cath     Activity Level: Independent    Patients Preferred Language:  Other: Simona     Needed: Yes    Vital Signs:  /70   Pulse 67   Temp 98.9  F (37.2  C) (Oral)   Resp 20   Ht 1.575 m (5' 2\")   Wt 65.5 kg (144 lb 6.4 oz)   SpO2 97%   BMI 26.41 kg/m       Cardiac Rhythm: SR    Pain Score: 0/10; pain meds helpful    Is the Patient Confused:  No    Last Food or Drink: 06/22/24, PTA    Focused Assessment:  Alert and oriented. Endorses flank and testicular pain.  Left testicular appears to be more swollen than the right.      Tests Performed: Done: Labs and Imaging    Treatments Provided:  IV abx    Family Dynamics/Concerns: No    Family Updated On Visitor Policy: No    Plan of Care Communicated to Family: Yes    Who Was Updated about Plan of Care: family at bedside    Belongings Checklist Done and Signed by Patient: Yes    Belongings Sent with Patient: yes    Medications sent with patient: NA    Covid: asymptomatic , NA    Additional Information:     RN: Sheryl Rivera RN   6/22/2024 7:15 PM        "

## 2024-06-23 NOTE — PLAN OF CARE
Problem: Infection  Goal: Absence of Infection Signs and Symptoms  Outcome: Progressing     Problem: Pain Acute  Goal: Optimal Pain Control and Function  Outcome: Progressing  Intervention: Develop Pain Management Plan  Recent Flowsheet Documentation  Taken 6/23/2024 0900 by Ciaran Hazel, RN  Pain Management Interventions:   care clustered   emotional support   repositioned   rest  Taken 6/23/2024 0800 by Ciaran Hazel, RN  Pain Management Interventions:   medication (see MAR)   care clustered   emotional support   repositioned   rest  Intervention: Prevent or Manage Pain  Recent Flowsheet Documentation  Taken 6/23/2024 0800 by Ciaran Hazel, RN  Medication Review/Management: medications reviewed   Goal Outcome Evaluation:       VSS on RA. A&Ox4. Up SBA. L testicle pain effectively managed w/ PRN tylenol. IV abx given per order. K is 3.2, replaced per protocol, recheck this evening. Suprapubic catheter w/ good UOP. Urology following, to change out SPC when supplies on unit. Given PRN senna & miralax to promote BM.

## 2024-06-23 NOTE — CONSULTS
[facility] CONSULTATION    Type of Consult: inpatient  Place of Service: [facility]   Reason for consult: Epididymitis  Request for consult by: Kacey Zamora MD    History of present illness:   Rere Robbins is a 69 year old male that was admitted for <principal problem not specified>. Urology is being consulted for Epididymo-orchitis. History obtained through patient via ranjit  and chart review.     Patient with history of urethral stricture HTN, hypothyroidism, and Diabetes, presented to emergency department yesterday for three days of left testicular and lower abdominal pain. Patient denies fevers, chills, nausea, vomiting, flank pain. Endorses possibly gross hematuria, but unsure when this last occurred. Denies changes in urine including color, purulence, sediment.    Patient reports that SP catheter was last exchanged 3 months ago. Denies pain at SP site or leaking.    Patient is established with [facility] for management of chronic suprapubic catheter due to proximal penile urethral stricture, with SP cath exchanges last completed in office on 6/13/2024    Past Medical History:  Past Medical History:   Diagnosis Date    Acquired hypothyroidism 12/3/2023    Acute idiopathic gout of right ankle 12/3/2023       Past Surgical History:  Past Surgical History:   Procedure Laterality Date    ABDOMEN SURGERY      large abdominal surgery after tree fell on patient    CYSTOSCOPY, FULGURATE BLEEDERS, EVACUATE CLOT(S), COMBINED N/A 11/28/2023    Procedure: CYSTOSCOPY, WITH FULGURATION OF HEMORRHAGING BLOOD VESSEL AND THROMBUS REMOVAL;  Surgeon: Eliazar Almanza MD;  Location: [facility]    CYSTOSCOPY, FULGURATE BLEEDERS, EVACUATE CLOT(S), COMBINED N/A 11/29/2023    Procedure: CYSTOSCOPY, OPEN SUPRAPUBIC TUBE PLACEMENT;  Surgeon: Jonathon Durand MD;  Location: [facility] OR       Social History:  Social History     Socioeconomic History    Marital status: Single     Spouse name: Not  on file    Number of children: Not on file    Years of education: Not on file    Highest education level: Not on file   Occupational History    Not on file   Tobacco Use    Smoking status: Former     Current packs/day: 0.00     Types: Cigarettes     Start date: 5/10/1969     Quit date: 5/10/2019     Years since quittin.1    Smokeless tobacco: Former     Types: Chew    Tobacco comments:     2 cigarettes/day   Substance and Sexual Activity    Alcohol use: No     Comment: Alcoholic Drinks/day: occasional    Drug use: No    Sexual activity: Not on file   Other Topics Concern    Not on file   Social History Narrative    ** Merged History Encounter **          Social Determinants of Health     Financial Resource Strain: Not on file   Food Insecurity: Not on file   Transportation Needs: Not on file   Physical Activity: Not on file   Stress: Not on file   Social Connections: Not on file   Interpersonal Safety: Not on file   Housing Stability: Not on file       Medications:  Current Facility-Administered Medications   Medication Dose Route Frequency Provider Last Rate Last Admin    acetaminophen (TYLENOL) tablet 975 mg  975 mg Oral TID PRN Oziel Chao MD        amLODIPine (NORVASC) tablet 5 mg  5 mg Oral Daily Oziel Chao MD        ARIPiprazole (ABILIFY) tablet 5 mg  5 mg Oral Daily Oziel Chao MD        calcium carbonate (TUMS) chewable tablet 1,000 mg  1,000 mg Oral 4x Daily PRN Oziel Chao MD        cefTRIAXone (ROCEPHIN) 2 g vial to attach to  ml bag for ADULTS or NS 50 ml bag for PEDS  2 g Intravenous Q24H Oziel Chao MD        enoxaparin ANTICOAGULANT (LOVENOX) injection 40 mg  40 mg Subcutaneous Q24H Oziel Chao MD        FLUoxetine (PROzac) capsule 20 mg  20 mg Oral Daily Oziel Chao MD        levothyroxine (SYNTHROID/LEVOTHROID) tablet 25 mcg  25 mcg Oral Daily Oziel Chao MD   25 mcg at 24 0622    lidocaine (LMX4) cream   Topical Q1H  PRN Oziel Chao MD        lidocaine 1 % 0.1-1 mL  0.1-1 mL Other Q1H PRN Oziel Chao MD        melatonin tablet 1 mg  1 mg Oral At Bedtime PRN Oziel Chao MD        naloxone (NARCAN) injection 0.2 mg  0.2 mg Intravenous Q2 Min PRN Oziel Chao MD        Or    naloxone (NARCAN) injection 0.4 mg  0.4 mg Intravenous Q2 Min PRN Oziel Chao MD        Or    naloxone (NARCAN) injection 0.2 mg  0.2 mg Intramuscular Q2 Min PRN Oziel Chao MD        Or    naloxone (NARCAN) injection 0.4 mg  0.4 mg Intramuscular Q2 Min PRN Oziel Chao MD        ondansetron (ZOFRAN ODT) ODT tab 4 mg  4 mg Oral Q6H PRN Oziel Chao MD        Or    ondansetron (ZOFRAN) injection 4 mg  4 mg Intravenous Q6H PRN Oziel Chao MD        oxyCODONE (ROXICODONE) tablet 5 mg  5 mg Oral Q4H PRN Oziel Chao MD   5 mg at 06/23/24 0219    oxyCODONE IR (ROXICODONE) half-tab 2.5 mg  2.5 mg Oral Q4H PRN Oziel Chao MD        pantoprazole (PROTONIX) EC tablet 40 mg  40 mg Oral QAM AC Oziel Chao MD   40 mg at 06/23/24 0622    polyethylene glycol (MIRALAX) Packet 17 g  17 g Oral BID PRN Oziel Chao MD        senna-docusate (SENOKOT-S/PERICOLACE) 8.6-50 MG per tablet 1 tablet  1 tablet Oral BID PRN Oziel Chao MD        Or    senna-docusate (SENOKOT-S/PERICOLACE) 8.6-50 MG per tablet 2 tablet  2 tablet Oral BID PROziel Coker MD        simvastatin (ZOCOR) tablet 10 mg  10 mg Oral At Bedtime Oziel Chao MD   10 mg at 06/22/24 7420    sodium chloride (PF) 0.9% PF flush 3 mL  3 mL Intracatheter q1 min prn Gale Segovia MD   3 mL at 06/22/24 2048    sodium chloride (PF) 0.9% PF flush 3 mL  3 mL Intracatheter Q8H Gale Segovia MD   3 mL at 06/22/24 1525    sodium chloride (PF) 0.9% PF flush 3 mL  3 mL Intracatheter Q8H Oziel Chao MD   3 mL at 06/22/24 2158    sodium chloride (PF) 0.9% PF flush 3 mL  3 mL Intracatheter q1 min  Oziel Rodriguez MD        traZODone (DESYREL) tablet 50 mg  50 mg Oral At Bedtime Oziel Chao MD   50 mg at 06/22/24 2156    vancomycin (VANCOCIN) 1,250 mg in sodium chloride 0.9 % 250 mL intermittent infusion  1,250 mg Intravenous Q24H Oziel Chao MD           Allergies:   No Known Allergies    Review of Systems:   A full 12 point review of systems was taken and is negative aside from what is noted above in the HPI    Physical Exam:  Temp:  [97.7  F (36.5  C)-99.6  F (37.6  C)] 99.6  F (37.6  C)  Pulse:  [] 89  Resp:  [15-28] 28  BP: (110-129)/(60-89) 112/66  SpO2:  [92 %-99 %] 92 %  General: NAD, alert, cooperative  Head: normocephalic, without abnormality / atraumatic  Abdomen: soft, not tender, moderately distended. no suprapubic fullness/tenderness. no CVA tenderness noted. SP catheter in middle of lower abdomen.  Geniturinary: Uncircumcised. Left testicle is swollen compared to right. Left testicle is mildly tender to palpation. Scrotum and perineum is non-erythematous, without induration, crepitus, or pain out of proportion to exam.    Skin: no rashes or lesions  Musculoskeletal: moves extremities equally; no calf edema or tenderness  Psychological: alert and oriented, answers questions appropriately. Questionable historian.       Labs:   Creatinine   Date Value Ref Range Status   06/22/2024 1.15 0.67 - 1.17 mg/dL Final   07/26/2016 0.8 0.7 - 1.3 mg/dL Final       Lab Results   Component Value Date    WBC 16.7 06/22/2024     Lab Results   Component Value Date    HGB 15.6 06/22/2024    HGB 17.3 06/08/2016     Lab Results   Component Value Date     06/22/2024       UA RESULTS:  Recent Labs   Lab Test 06/22/24  1512   COLOR Yellow   APPEARANCE Turbid*   URINEGLC Negative   URINEBILI Negative   URINEKETONE Negative   SG 1.019   UBLD 0.1 mg/dL*   URINEPH 6.0   PROTEIN 50*   NITRITE Negative   LEUKEST 500 Zeeshan/uL*   RBCU 2   WBCU 172*         Urine culture: pending    Lab  Results: personally reviewed     Imaging:  EXAM: CT ABDOMEN PELVIS W CONTRAST  LOCATION: [facility]  DATE: 6/22/2024     INDICATION: Testicle pain radiating into the lower back with suprapubic TTP.  Has suprapubic catheter for history of proximal penile injury with stricture.  COMPARISON: CT 11/29/2023, 11/27/2023 and 4/29/2019  TECHNIQUE: CT scan of the abdomen and pelvis was performed following injection of IV contrast. Multiplanar reformats were obtained. Dose reduction techniques were used.  CONTRAST: Isovue 370  70ml     FINDINGS:   LOWER CHEST: 6 mm nodule right middle lobe. This was present on the prior study of 11/27/2023, however, the exams prior to this did not include this region of the lung therefore long-term stability cannot be confirmed.     HEPATOBILIARY: Fatty infiltration of the liver. The gallbladder is contracted. No biliary dilatation.     PANCREAS: Normal.     SPLEEN: Normal.     ADRENAL GLANDS: Normal.     KIDNEYS/BLADDER: Bilateral renal cysts require no specific follow-up. No hydronephrosis or kidney stones. There is a suprapubic Chase catheter within the decompressed urinary bladder. No surrounding fluid collections or inflammation.     BOWEL: Normal.     LYMPH NODES: Normal.     VASCULATURE: Normal.     PELVIC ORGANS: Mild prostate gland enlargement.     MUSCULOSKELETAL: Multiple old fractures of the pelvic bones.                                                                      IMPRESSION:   1.  There are no kidney stones or hydronephrosis. Bilateral renal cysts require no specific follow-up.  2.  Suprapubic Chase catheter within the decompressed urinary bladder. No complications visualized.  3.  There is no evidence for bowel obstruction or inflammation.  4.  Hepatic steatosis.  5.  Multiple old complex fractures involving the pelvic bones.  6.  6 mm nodule right middle lobe is stable compared to the most recent study however older studies to confirm long-term  stability. Recommend a follow-up chest CT in 6 months.    EXAM: US TESTICULAR AND SCROTUM WITH DOPPLER LIMITED  LOCATION: [facility]  DATE: 6/22/2024     INDICATION: Left testicular swelling pain radiating into back. Suprapubic catheter in place.  COMPARISON: Previous ultrasound 11/29/2023.  TECHNIQUE: Ultrasound of scrotum with color flow and spectral Doppler with waveform analysis performed.     FINDINGS:     RIGHT: Right testicle measures 3.7 x 1.4 x 2.7 cm. Heterogeneity of the testicular echotexture. There is a new 2 mm cyst in the testicle. No solid masses. Normal vascularity. 3 mm epididymal head cyst. Normal vascularity. No hydrocele. No varicocele.     LEFT: Left testicle measures 3.7 x 2.8 x 2.4 cm. Heterogeneous echotexture of the testicle with increased vascularity suggesting orchitis. No evidence for mass. Normal epididymis. Small hydrocele. No varicocele.                                                                      IMPRESSION:  1.  There is heterogeneity of the left testicular echotexture with increased vascularity suggesting orchitis. Small likely reactive left hydrocele.  2.  Small cysts within the right epididymal head and right testicle. Remainder unremarkable.    I have personally reviewed the imaging reports above.     Assessment / Plan : Rere Robbins is being seen by [facility] for epididymo-orchitis.     - Patient is a 69 year old male presented to the hospital for 3 days of left testicular pain.   - UA concerning for active UTI. UC pending. Currently on rocephin and vancomycin, continue pending, UC result. Definative abx regimen per ID.  -US consistent with orchitis. CT bilateral renal cysts, no hydronephosis, SP fair in place with bladder decompressed. Mild prostate enlargement.  - Exam not consistent with destiny's.  - Nursing to order 20 fr fair catheter for catheter exchange today. Procedure note to follow.   - Pain control per attending provider.  Recommend scrotal elevation with rolled towel or supportive underwear.    Thank you for consulting [facility] regarding this patient's care. Please contact us with questions or concerns.     Prem Tirado PA-C  [facility]   [phone]     ADDENDUM:    10ml Balloon on existing catheter was successfully deflated and catheter was removed. Then site was prepped with iodine cleanse and a 20fr fair catheter was placed with ease. Inflated balloon with 10 ml sterile water. Patient tolerated the procedure with out discomfort. New dressing applied surrounding SP site.    -SP catheter to be exchanged every 4 weeks.    Prem Tirado PA-C on 6/23/2024 at 15:06 PM

## 2024-06-23 NOTE — MEDICATION SCRIBE - ADMISSION MEDICATION HISTORY
Medication Scribe Admission Medication History    Admission medication history is complete. The information provided in this note is only as accurate as the sources available at the time of the update.    Information Source(s): Family member, Prescription bottles, and CareEverywhere/SureScripts via in-person and phone    Pertinent Information:     Changes made to PTA medication list:  Added: None  Deleted: None  Changed: None    Allergies reviewed with patient and updates made in EHR: yes    Medication History Completed By: PAT POTTS 6/22/2024 9:08 PM    PTA Med List   Medication Sig Last Dose    acetaminophen (TYLENOL) 650 MG CR tablet Take 1,300 mg by mouth every 8 hours as needed Unknown at prn    amLODIPine (NORVASC) 5 MG tablet Take 5 mg by mouth daily 6/22/2024 at am    ARIPiprazole (ABILIFY) 5 MG tablet Take 5 mg by mouth daily 6/22/2024 at am    diclofenac (VOLTAREN) 1 % topical gel Apply 2 g topically 4 times daily as needed for moderate pain (right knee) Unknown at prn    ferrous gluconate (FERGON) 324 (38 Fe) MG tablet Take 1 tablet by mouth at bedtime 6/21/2024 at pm    fish oil-omega-3 fatty acids 1000 MG capsule Take 2 g by mouth daily 6/22/2024 at am    FLUoxetine (PROZAC) 20 MG capsule Take 20 mg by mouth daily 6/22/2024 at am    levothyroxine (SYNTHROID, LEVOTHROID) 25 MCG tablet [LEVOTHYROXINE (SYNTHROID, LEVOTHROID) 25 MCG TABLET] Take 1 tablet by mouth daily. 6/22/2024 at am    multivitamin (ONE A DAY) per tablet Take 1 tablet by mouth daily 6/22/2024 at am    omeprazole (PRILOSEC) 20 MG capsule Take 20 mg by mouth daily 6/22/2024 at am    simvastatin (ZOCOR) 10 MG tablet Take 10 mg by mouth at bedtime 6/21/2024 at pm    traZODone (DESYREL) 50 MG tablet Take  mg by mouth at bedtime 6/21/2024 at pm    VITAMIN D3 2,000 unit capsule Take 4,000 Units by mouth daily 6/22/2024 at am

## 2024-06-23 NOTE — PLAN OF CARE
Problem: Adult Inpatient Plan of Care  Goal: Absence of Hospital-Acquired Illness or Injury  Intervention: Identify and Manage Fall Risk  Recent Flowsheet Documentation  Taken 6/22/2024 2342 by Charles Mcclain RN  Safety Promotion/Fall Prevention:   lighting adjusted   nonskid shoes/slippers when out of bed  Intervention: Prevent Skin Injury  Recent Flowsheet Documentation  Taken 6/22/2024 2342 by Charles Mcclain, RN  Body Position: position changed independently   Goal Outcome Evaluation:       Pt is disoriented to time and place. On room air, c/o pain and administered prn oxycodone. Simona speaking and utilized  services.

## 2024-06-23 NOTE — CONSULTS
"Care Management Initial Consult    General Information  Assessment completed with: Patient, Family (granddaughter and great granddaughter), Pah and granddaughter Paw Hser and great granddaughter Paw Guillermo  Type of CM/SW Visit: Initial Assessment    Primary Care Provider verified and updated as needed: Yes   Readmission within the last 30 days: no previous admission in last 30 days      Reason for Consult: discharge planning  Advance Care Planning: Advance Care Planning Reviewed: no concerns identified          Communication Assessment  Patient's communication style: spoken language (non-English) (speaks Simona)             Cognitive  Cognitive/Neuro/Behavioral:                        Living Environment:   People in home: grandchild(prasanna)  Pah and granddaughter Geraldo Hser and other extended family.  Current living Arrangements: house      Able to return to prior arrangements: yes       Family/Social Support:  Care provided by: self, other (see comments) (granddaughter Geraldo Reddy is his PCA)  Provides care for: no one     PCA, Other (specify) (federica Reddy is his PCA)          Description of Support System: Supportive, Involved    Support Assessment: Adequate family and caregiver support, Adequate social supports, Patient communicates needs well met    Current Resources:   Patient receiving home care services: Yes  Skilled Home Care Services: Skilled Nursing (They believe it is \"[facility] RN that does weekly medication set up\")  Community Resources: PCA, Home Care  Equipment currently used at home: cane, straight, walker, rolling (\"He mostly uses his cane. Also has a walker if needed\".)  Supplies currently used at home: Other (\"suprapubic catheter, glasses\")    Employment/Financial:  Employment Status: retired     Employment/ Comments: \"no  history\"  Financial Concerns:     Referral to Financial Worker: No       Does the patient's insurance plan have a 3 day qualifying hospital stay " "waiver?  No      Functional Status:  Prior to admission patient needed assistance:   Dependent ADLs:: Ambulation-cane, Bathing, Dressing  Dependent IADLs:: Cleaning, Cooking, Laundry, Shopping, Meal Preparation, Medication Management, Money Management, Transportation (\"family helps\")       Mental Health Status:          Chemical Dependency Status:                Values/Beliefs:  Spiritual, Cultural Beliefs, Episcopal Practices, Values that affect care:                 Additional Information:  Rere lives in a house with his granddaughter Geraldo Reddy and other extended family. His granddaughter Geraldo Reddy is his PCA for help with most ADLs and all IADLs. \"He mostly uses his cane. Also has a walker if needed\".    He also has help from [facility] RN for weekly medication set up.     Unknown discharge needs at this time.    Family to transport at discharge.    CM to follow for medical progression of care, discharge recommendations, and final discharge plan.    Cecy Barajas RN    "
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
## 2024-06-23 NOTE — PROGRESS NOTES
"[facility] Hospitalist Progress Note  [facility]  Admission date: 6/22/2024    Summary:    69M with urethral stricture, PTSD, memory impairment, hx psychosis s/p SP catheter who presents with 2 days of left-sided testicular pain and lower abdominal pain. Found to have left-sided orchitis.     Assessment/Plan    #Acute orchitis, left-sided  #Possible CAUTI with chronic SP tube  2 days of left-sided testicular pain and lower abdominal pain with chronic SP tube. Developed chills at home. Here with leukocytosis to 16. UA infected looking but from SP tube. CT A/P unremarkable, but testicular US showing orchitis. Low concern for gangrene on exam and on CT. Considered mumps but no evidence of salivary gland swelling, joint pain, headache.  - Continue CTX  - ID and urology seeing.  - SP catheter exchange     #Primary HTN - Continue home amlodipine     #Depression   #hx psychosis  - Continue home Prozac and Abilify  - Continue home trazodone     #Hypothyroidism  - Continue home leovthyroxine     #GERD  - Continue home PPI    Checklist:  Code Status: Full Code    Diet: Combination Diet Regular Diet Adult     DVT px:  Enoxaparin (Lovenox) SQ    Disposition and Discharge Planning    Medically Ready for Discharge: 1-2 days      System Identified Risk Variables  Auto-populated based on system request - if needs to be addressed in treatment plan they will be addressed above:  \"  Clinically Significant Risk Factors Present on Admission                              # Overweight: Estimated body mass index is 26.73 kg/m  as calculated from the following:    Height as of this encounter: 1.575 m (5' 2\").    Weight as of this encounter: 66.3 kg (146 lb 2.6 oz).              \"    Interval Events/Subjective/Notable results:    Seen with interpretor.  No particularly talkative but no complaints.  Tender to scrotal palpation on L.    Reviewed: Bmp, CBC, UA, US, CT      Objective    Vital signs in last 24 hours  Temp:  [97.7  F " (36.5  C)-99.6  F (37.6  C)] 98.2  F (36.8  C)  Pulse:  [] 79  Resp:  [15-28] 25  BP: (106-129)/(60-89) 106/60  SpO2:  [92 %-99 %] 94 % O2 Device: None (Room air)    Weight:   146 lbs 2.64 oz  Body mass index is 26.73 kg/m .    Intake/Output last 3 shifts  I/O last 3 completed shifts:  In: -   Out: 525 [Urine:525]    Physical Exam  General:  Alert, cooperative, no distress    Neurologic:  oriented, facial symmetry preserved, fluent speech.   Psych: calm  HEENT:  Anicteric, MMM  CV: RRR no MRG, normal S1 and S2, no edema  Lungs: CTAB.  Easyrespirations  Abd: soft, NT  : L scrotal TTP.  No findings suggestive of necrotizing infection  Skin: no rashes noted on exposed skin.    Chase Catheter: Not present  Lines: None          Medical Decision Making               Kacey Zamora MD, [facility]  Internal Medicine Hospitalist

## 2024-06-23 NOTE — PLAN OF CARE
Goal Outcome Evaluation:         Problem: Adult Inpatient Plan of Care  Goal: Optimal Comfort and Wellbeing  Intervention: Monitor Pain and Promote Comfort  Recent Flowsheet Documentation  Taken 6/22/2024 2044 by Paola Waldron RN  Pain Management Interventions: medication (see MAR)     Problem: Sepsis/Septic Shock  Goal: Absence of Infection Signs and Symptoms  Outcome: Progressing  Intervention: Promote Recovery  Recent Flowsheet Documentation  Taken 6/22/2024 2014 by Paola Waldron RN  Activity Management: ambulated in room         Pt admitted to  around 2000 tonight.  Pt up independently, has a chronic suprapubic catheter connected to a leg bag, fair bag not available right now.  Pt c/o mid lower back pain and testicular pain of a 6.  Went away with morphine, orders now changed to oxycodone.  Left testicle is hard and painful.  Simona speaking.  Paola Waldron, RN

## 2024-06-24 LAB
HOLD SPECIMEN: NORMAL
HOLD SPECIMEN: NORMAL
LACTATE SERPL-SCNC: 0.7 MMOL/L (ref 0.7–2)
LACTATE SERPL-SCNC: 1 MMOL/L (ref 0.7–2)
POTASSIUM SERPL-SCNC: 3.5 MMOL/L (ref 3.4–5.3)
WBC # BLD AUTO: 12.2 10E3/UL (ref 4–11)

## 2024-06-24 PROCEDURE — 36415 COLL VENOUS BLD VENIPUNCTURE: CPT | Performed by: FAMILY MEDICINE

## 2024-06-24 PROCEDURE — 85048 AUTOMATED LEUKOCYTE COUNT: CPT | Performed by: INTERNAL MEDICINE

## 2024-06-24 PROCEDURE — 84132 ASSAY OF SERUM POTASSIUM: CPT | Performed by: HOSPITALIST

## 2024-06-24 PROCEDURE — 99232 SBSQ HOSP IP/OBS MODERATE 35: CPT | Performed by: FAMILY MEDICINE

## 2024-06-24 PROCEDURE — 36415 COLL VENOUS BLD VENIPUNCTURE: CPT | Performed by: HOSPITALIST

## 2024-06-24 PROCEDURE — 250N000011 HC RX IP 250 OP 636: Performed by: INTERNAL MEDICINE

## 2024-06-24 PROCEDURE — 120N000001 HC R&B MED SURG/OB

## 2024-06-24 PROCEDURE — 250N000013 HC RX MED GY IP 250 OP 250 PS 637: Performed by: STUDENT IN AN ORGANIZED HEALTH CARE EDUCATION/TRAINING PROGRAM

## 2024-06-24 PROCEDURE — 36415 COLL VENOUS BLD VENIPUNCTURE: CPT | Performed by: STUDENT IN AN ORGANIZED HEALTH CARE EDUCATION/TRAINING PROGRAM

## 2024-06-24 PROCEDURE — 83605 ASSAY OF LACTIC ACID: CPT | Performed by: FAMILY MEDICINE

## 2024-06-24 PROCEDURE — 99233 SBSQ HOSP IP/OBS HIGH 50: CPT | Performed by: INTERNAL MEDICINE

## 2024-06-24 PROCEDURE — 87040 BLOOD CULTURE FOR BACTERIA: CPT | Performed by: STUDENT IN AN ORGANIZED HEALTH CARE EDUCATION/TRAINING PROGRAM

## 2024-06-24 PROCEDURE — 250N000011 HC RX IP 250 OP 636: Mod: JZ | Performed by: STUDENT IN AN ORGANIZED HEALTH CARE EDUCATION/TRAINING PROGRAM

## 2024-06-24 PROCEDURE — 83605 ASSAY OF LACTIC ACID: CPT | Performed by: HOSPITALIST

## 2024-06-24 RX ORDER — CEFEPIME HYDROCHLORIDE 2 G/1
2 INJECTION, POWDER, FOR SOLUTION INTRAVENOUS EVERY 12 HOURS
Status: DISCONTINUED | OUTPATIENT
Start: 2024-06-24 | End: 2024-06-25

## 2024-06-24 RX ADMIN — CEFEPIME HYDROCHLORIDE 2 G: 2 INJECTION, POWDER, FOR SOLUTION INTRAVENOUS at 13:50

## 2024-06-24 RX ADMIN — CEFTRIAXONE SODIUM 2 G: 2 INJECTION, POWDER, FOR SOLUTION INTRAMUSCULAR; INTRAVENOUS at 11:07

## 2024-06-24 RX ADMIN — PANTOPRAZOLE SODIUM 40 MG: 40 TABLET, DELAYED RELEASE ORAL at 06:13

## 2024-06-24 RX ADMIN — FLUOXETINE HYDROCHLORIDE 20 MG: 20 CAPSULE ORAL at 09:05

## 2024-06-24 RX ADMIN — SIMVASTATIN 10 MG: 10 TABLET, FILM COATED ORAL at 21:17

## 2024-06-24 RX ADMIN — AMLODIPINE BESYLATE 5 MG: 5 TABLET ORAL at 09:05

## 2024-06-24 RX ADMIN — ARIPIPRAZOLE 5 MG: 5 TABLET ORAL at 09:11

## 2024-06-24 RX ADMIN — POLYETHYLENE GLYCOL 3350 17 G: 17 POWDER, FOR SOLUTION ORAL at 09:04

## 2024-06-24 RX ADMIN — TRAZODONE HYDROCHLORIDE 50 MG: 50 TABLET ORAL at 21:17

## 2024-06-24 RX ADMIN — SENNOSIDES AND DOCUSATE SODIUM 2 TABLET: 50; 8.6 TABLET ORAL at 09:05

## 2024-06-24 RX ADMIN — ENOXAPARIN SODIUM 40 MG: 40 INJECTION SUBCUTANEOUS at 09:05

## 2024-06-24 RX ADMIN — LEVOTHYROXINE SODIUM 25 MCG: 0.03 TABLET ORAL at 06:13

## 2024-06-24 ASSESSMENT — ACTIVITIES OF DAILY LIVING (ADL)
ADLS_ACUITY_SCORE: 23
ADLS_ACUITY_SCORE: 23
ADLS_ACUITY_SCORE: 27
ADLS_ACUITY_SCORE: 23
ADLS_ACUITY_SCORE: 27
ADLS_ACUITY_SCORE: 23

## 2024-06-24 NOTE — PROGRESS NOTES
"[facility] Hospitalist Progress Note  [facility]  Admission date: 6/22/2024    Summary:    69M with urethral stricture, PTSD, memory impairment, hx psychosis s/p SP catheter who presents with 2 days of left-sided testicular pain and lower abdominal pain. Found to have left-sided orchitis.  Did have fever 101.2 the night of 6/23.    Assessment/Plan    #Acute orchitis, left-sided  #Possible CAUTI with chronic SP tube  2 days of left-sided testicular pain and lower abdominal pain with chronic SP tube. Developed chills at home. Here with leukocytosis to 16. UA infected looking but from SP tube. CT A/P unremarkable, but testicular US showing orchitis. Low concern for gangrene on exam and on CT. Considered mumps but no evidence of salivary gland swelling, joint pain, headache.  - Per ID will change from ceftriaxone to IV cefepime, follow susceptibility.   - ID and urology seeing.  - SP catheter exchange 6/23  - ongoing testicular pain could take weeks to resolve.     #Primary HTN - Continue home amlodipine     #Depression   #hx psychosis  - Continue home Prozac and Abilify  - Continue home trazodone     #Hypothyroidism  - Continue home leovthyroxine     #GERD  - Continue home PPI    Checklist:  Code Status: Full Code    Diet: Combination Diet Regular Diet Adult     DVT px:  Enoxaparin (Lovenox) SQ    Disposition and Discharge Planning    Medically Ready for Discharge: 1-2 days      System Identified Risk Variables  Auto-populated based on system request - if needs to be addressed in treatment plan they will be addressed above:  \"  Clinically Significant Risk Factors        # Hypokalemia: Lowest K = 3.2 mmol/L in last 2 days, will replace as needed                          # Overweight: Estimated body mass index is 26.73 kg/m  as calculated from the following:    Height as of this encounter: 1.575 m (5' 2\").    Weight as of this encounter: 66.3 kg (146 lb 2.6 oz)., PRESENT ON ADMISSION            \"    Interval " Events/Subjective/Notable results:    Seen with interpretor.  L testicle still very painful.  Also low back pain.    Reviewed: Bmp, CBC, UA, US, CT      Objective    Vital signs in last 24 hours  Temp:  [97.9  F (36.6  C)-101.2  F (38.4  C)] 97.9  F (36.6  C)  Pulse:  [75-87] 79  Resp:  [18-24] 18  BP: (128-156)/(69-85) 128/78  SpO2:  [95 %-96 %] 96 % O2 Device: None (Room air)    Weight:   146 lbs 2.64 oz  Body mass index is 26.73 kg/m .    Intake/Output last 3 shifts  I/O last 3 completed shifts:  In: -   Out: 2050 [Urine:2050]    Physical Exam  General:  Alert, cooperative, no distress    Neurologic:  oriented, facial symmetry preserved, fluent speech.   Psych: calm  HEENT:  Anicteric, MMM  CV: RRR no MRG, normal S1 and S2, no edema  Lungs: CTAB.  Easyrespirations  Abd: soft, NT  : L scrotal TTP.  No findings suggestive of necrotizing infection  Skin: no rashes noted on exposed skin.    Chase Catheter: Not present  Lines: None          Medical Decision Making

## 2024-06-24 NOTE — PROGRESS NOTES
Place of Service:  [facility]     Reason for follow up: Left orchitis     SUBJECTIVE:  Events:  T101.2 at [place], afebrile this AM.     Patient reports left testicular pain/swelling still present, maybe a little better. Denies abdominal and bilateral flank pain, chills. C/o mid low back pain. SP catheter replaced 6/23, draining well.     +BM today.     Language Line Simona  utilized during entire visit.     OBJECTIVE:  PHYSICAL EXAM:  Temp: 98  F (36.7  C) Temp src: Oral BP: (!) 156/79 (notified nurse) Pulse: 76   Resp: 19 SpO2: 95 % O2 Device: None (Room air)    General: NAD, alert, cooperative  Head: normocephalic, without abnormality / atraumatic  Abdomen: soft, distended, non- tender, no suprapubic fullness, no suprapubic tenderness. No bilateral CVA tenderness. SP exit site is dry, without erythema or edema. SP catheter draining clear yellow urine.   Genitourinary: Uncircumcised penis without erythema or edema. Scrotum with mild left erythema, without excessive warmth, necrotic lesions or crepitus. Left testicle edematous, mildly tender, without fluctuance. No right testicular edema or pain. Prostate enlarged and non-tender on CHRISTY.   Musculoskeletal: moves all four extremities equally.   Psychological: alert and oriented, answers questions appropriately.     LABS:  Creatinine   Date Value Ref Range Status   06/23/2024 0.98 0.67 - 1.17 mg/dL Final   07/26/2016 0.8 0.7 - 1.3 mg/dL Final     WBC Count   Date Value Ref Range Status   06/24/2024 12.2 (H) 4.0 - 11.0 10e3/uL Final     Hemoglobin   Date Value Ref Range Status   06/23/2024 13.0 (L) 13.3 - 17.7 g/dL Final   06/08/2016 17.3 13.3 - 17.7 g/dL Final     Platelet Count   Date Value Ref Range Status   06/23/2024 288 150 - 450 10e3/uL Final       UA:  UA RESULTS:  Recent Labs   Lab Test 06/22/24  1512   COLOR Yellow   APPEARANCE Turbid*   URINEGLC Negative   URINEBILI Negative   URINEKETONE Negative   SG 1.019   UBLD 0.1 mg/dL*   URINEPH 6.0    PROTEIN 50*   NITRITE Negative   LEUKEST 500 Zeeshan/uL*   RBCU 2   WBCU 172*     Cultures:  Urine 6/22, prelim: >100k gram neg bacilli, >100k gram neg bacilli    Blood 6/24: NGTD  Blood 6/22: NGTD    Lab Results: personally reviewed.     ASSESSMENT/PLAN:  Rere Pwar is being seen by [facility] for left orchitis     - 69 yr old male admitted 6/22 with 3 day hx of left testicular pain. US 6/22 consistent with left orchitis. CT 6/22 showed mild prostate enlargement, bilateral renal cysts and no hydronephrosis. IV ceftriaxone initiated 6/22  - T101.2 at [place]. WBC improving, 12.2 today. Prelim UC: >100k gram neg bacilli x 2. BC: pending. Pain stable/improving. Prostate non-tender on exam. ID following, continuing ceftriaxone IV, awaiting cultures, considering repeat imaging if no additional improvement. Appreciate ID recs.   - Discussed with patient that it may take weeks for complete resolution of left testicle pain/edema.   - Ice prn, elevate scrotum with towel rolled beneath scrotum, recommend support underwear (jock-strap) when ambulating.   - SP catheter exchanged 6/23.   - Will continue to follow.     Handy Dominguez, APRN, CNP  [facility]   [phone]

## 2024-06-24 NOTE — PLAN OF CARE
Problem: Pain Acute  Goal: Optimal Pain Control and Function  Outcome: Progressing  Intervention: Prevent or Manage Pain  Recent Flowsheet Documentation  Taken 6/23/2024 1011 by Violeta Wong RN  Medication Review/Management: medications reviewed   Goal Outcome Evaluation:      Plan of Care Reviewed With: patient      Patient denied pain. PIV saline locked and patent. K protocol re-check in morning. Alert and oriented x4.  used for assessment.

## 2024-06-24 NOTE — PLAN OF CARE
Problem: Adult Inpatient Plan of Care  Goal: Absence of Hospital-Acquired Illness or Injury  Intervention: Identify and Manage Fall Risk  Recent Flowsheet Documentation  Taken 6/24/2024 0002 by Elias Dockery RN  Safety Promotion/Fall Prevention:   activity supervised   nonskid shoes/slippers when out of bed   room organization consistent   safety round/check completed  Intervention: Prevent Skin Injury  Recent Flowsheet Documentation  Taken 6/24/2024 0002 by Elias Dockery RN  Body Position: position changed independently  Goal: Optimal Comfort and Wellbeing  Outcome: Progressing   Goal Outcome Evaluation:    Pt alert and oriented, Simona speaking.  services utilized. Patient denied pain. Temp 101.2, other VSS. Cross cover notified. Blood cultures x2, Lactic Acid collected. Recheck Temp 98.4, Lactic Acid result 1.0. Pt slept well, suprapubic catheter intact.

## 2024-06-24 NOTE — PROGRESS NOTES
[facility]  Infectious Disease   Progress Note     Date of Admission:  6/22/2024    Assessment & Plan   L orchitis: testicular US with orchitis. Clinically improving. Leukocytosis better. Urology following.  CT 6/22 showed mild prostate enlargement, bilateral renal cysts and no hydronephrosis.   Suprapubic catheter: UA difficult to interpret in this setting.   UA with pyuria, UC with pseudomonas, fever overnight      PLAN  Discontinue CTX  Iv cefepime  Trend fever  Check susceptibilities to see if susceptible to po quinolones    Shan Britt M.D.    ______________________________________________________________________    Interval History   No evnts    Past medical, family, and social history reviewed, unchanged from before.      Physical Exam   Vital Signs: Temp: 98  F (36.7  C) Temp src: Oral BP: (!) 156/79 (notified nurse) Pulse: 76   Resp: 19 SpO2: 95 % O2 Device: None (Room air)    Weight: 146 lbs 2.64 oz  Gen. appearance nontoxic  Eyes no conjunctivitis or icterus  Neck no stiffness or neck vein distention, no LN  Heart  No edema  Lungs breathing comfortably    Abdomen soft not tender  Extremities no synovitis, trace edema   some swelling tenderness  Urology: Prostate enlarged and non-tender on CHRISTY.     Skin  no rash or emboli  Neurologic alert oriented no focal deficits      Data   Results for orders placed or performed during the hospital encounter of 06/22/24 (from the past 24 hour(s))   Potassium   Result Value Ref Range    Potassium 4.1 3.4 - 5.3 mmol/L   Lactic Acid Whole Blood w/ 1x repeat in 2 hrs when >2   Result Value Ref Range    Lactic Acid, Initial 1.0 0.7 - 2.0 mmol/L   Extra Green Top Tube (LAB USE ONLY)   Result Value Ref Range    Hold Specimen JIC    Extra Purple Top EDTA (LAB USE ONLY)   Result Value Ref Range    Hold Specimen JIC    WBC count   Result Value Ref Range    WBC Count 12.2 (H) 4.0 - 11.0 10e3/uL   Potassium   Result Value Ref Range    Potassium 3.5  3.4 - 5.3 mmol/L

## 2024-06-24 NOTE — PLAN OF CARE
Problem: Infection  Goal: Absence of Infection Signs and Symptoms  Outcome: Progressing     Problem: Pain Acute  Goal: Optimal Pain Control and Function  Outcome: Progressing  Intervention: Prevent or Manage Pain  Recent Flowsheet Documentation  Taken 6/24/2024 0900 by Ciaran Hazel, RN  Medication Review/Management: medications reviewed   Goal Outcome Evaluation:       VSS on RA. A&Ox4. Simona speaking,  utilized. Up SBA. Denies pain CHEYANNE. IV abx given per order, switched from ceftriaxone to cefepime. K stable, recheck w/ tenisha am labs. Suprapubic catheter w/ good UOP. Had BM this am, but was difficult to pass, given PRN senna & miralax to help ease BM.

## 2024-06-24 NOTE — SIGNIFICANT EVENT
January 28, 2019      [facility] U[facility] 4th Floor  [address]  [facility] [address]  Phone: [phone]       Patient: Ned Oliveira   YOB: 1950  Date of Visit: 01/28/2019    To Whom It May Concern:    Elvira Oliveira  was at [facility] on 01/28/2019. He may return to work/school on 1/29/2019. If you have any questions or concerns, or if I can be of further assistance, please do not hesitate to contact me.    Sincerely,    Meghan Hernandez PA-C      Significant Event Note    Notified about fever of 101.2F. On CTX for orchitis. ID And Urology following.    Plan  - Continue CTX  - Ordered new blood cultures, initial cultures remain negative    SUJATA BARRAGAN MD

## 2024-06-25 LAB
CREAT SERPL-MCNC: 0.99 MG/DL (ref 0.67–1.17)
CRP SERPL-MCNC: 53.9 MG/L
EGFRCR SERPLBLD CKD-EPI 2021: 83 ML/MIN/1.73M2
PLATELET # BLD AUTO: 338 10E3/UL (ref 150–450)
POTASSIUM SERPL-SCNC: 3.4 MMOL/L (ref 3.4–5.3)
POTASSIUM SERPL-SCNC: 4.1 MMOL/L (ref 3.4–5.3)
URATE SERPL-MCNC: 6.6 MG/DL (ref 3.4–7)
WBC # BLD AUTO: 13 10E3/UL (ref 4–11)

## 2024-06-25 PROCEDURE — 36415 COLL VENOUS BLD VENIPUNCTURE: CPT | Performed by: HOSPITALIST

## 2024-06-25 PROCEDURE — 85048 AUTOMATED LEUKOCYTE COUNT: CPT | Performed by: FAMILY MEDICINE

## 2024-06-25 PROCEDURE — 82565 ASSAY OF CREATININE: CPT | Performed by: STUDENT IN AN ORGANIZED HEALTH CARE EDUCATION/TRAINING PROGRAM

## 2024-06-25 PROCEDURE — 250N000011 HC RX IP 250 OP 636: Mod: JZ | Performed by: STUDENT IN AN ORGANIZED HEALTH CARE EDUCATION/TRAINING PROGRAM

## 2024-06-25 PROCEDURE — 85049 AUTOMATED PLATELET COUNT: CPT | Performed by: STUDENT IN AN ORGANIZED HEALTH CARE EDUCATION/TRAINING PROGRAM

## 2024-06-25 PROCEDURE — 250N000011 HC RX IP 250 OP 636: Performed by: INTERNAL MEDICINE

## 2024-06-25 PROCEDURE — 84550 ASSAY OF BLOOD/URIC ACID: CPT | Performed by: INTERNAL MEDICINE

## 2024-06-25 PROCEDURE — 120N000001 HC R&B MED SURG/OB

## 2024-06-25 PROCEDURE — 250N000013 HC RX MED GY IP 250 OP 250 PS 637: Performed by: INTERNAL MEDICINE

## 2024-06-25 PROCEDURE — 86140 C-REACTIVE PROTEIN: CPT | Performed by: FAMILY MEDICINE

## 2024-06-25 PROCEDURE — 250N000011 HC RX IP 250 OP 636: Mod: JZ | Performed by: INTERNAL MEDICINE

## 2024-06-25 PROCEDURE — 99232 SBSQ HOSP IP/OBS MODERATE 35: CPT | Performed by: INTERNAL MEDICINE

## 2024-06-25 PROCEDURE — 250N000013 HC RX MED GY IP 250 OP 250 PS 637: Performed by: STUDENT IN AN ORGANIZED HEALTH CARE EDUCATION/TRAINING PROGRAM

## 2024-06-25 PROCEDURE — 84132 ASSAY OF SERUM POTASSIUM: CPT | Performed by: HOSPITALIST

## 2024-06-25 PROCEDURE — 99232 SBSQ HOSP IP/OBS MODERATE 35: CPT | Performed by: HOSPITALIST

## 2024-06-25 PROCEDURE — 36415 COLL VENOUS BLD VENIPUNCTURE: CPT | Performed by: FAMILY MEDICINE

## 2024-06-25 PROCEDURE — 84132 ASSAY OF SERUM POTASSIUM: CPT | Performed by: FAMILY MEDICINE

## 2024-06-25 PROCEDURE — 250N000011 HC RX IP 250 OP 636: Mod: JZ | Performed by: HOSPITALIST

## 2024-06-25 PROCEDURE — 250N000013 HC RX MED GY IP 250 OP 250 PS 637: Performed by: HOSPITALIST

## 2024-06-25 RX ORDER — BENZONATATE 100 MG/1
100 CAPSULE ORAL 3 TIMES DAILY PRN
Status: DISCONTINUED | OUTPATIENT
Start: 2024-06-25 | End: 2024-06-26 | Stop reason: HOSPADM

## 2024-06-25 RX ORDER — POTASSIUM CHLORIDE 1500 MG/1
40 TABLET, EXTENDED RELEASE ORAL ONCE
Status: COMPLETED | OUTPATIENT
Start: 2024-06-25 | End: 2024-06-25

## 2024-06-25 RX ORDER — KETOROLAC TROMETHAMINE 15 MG/ML
15 INJECTION, SOLUTION INTRAMUSCULAR; INTRAVENOUS ONCE
Status: COMPLETED | OUTPATIENT
Start: 2024-06-25 | End: 2024-06-25

## 2024-06-25 RX ORDER — MEROPENEM 1 G/1
1 INJECTION, POWDER, FOR SOLUTION INTRAVENOUS EVERY 8 HOURS
Status: DISCONTINUED | OUTPATIENT
Start: 2024-06-25 | End: 2024-06-26 | Stop reason: HOSPADM

## 2024-06-25 RX ADMIN — CEFEPIME HYDROCHLORIDE 2 G: 2 INJECTION, POWDER, FOR SOLUTION INTRAVENOUS at 01:12

## 2024-06-25 RX ADMIN — AMLODIPINE BESYLATE 5 MG: 5 TABLET ORAL at 08:36

## 2024-06-25 RX ADMIN — TRAZODONE HYDROCHLORIDE 50 MG: 50 TABLET ORAL at 20:46

## 2024-06-25 RX ADMIN — Medication 1 MG: at 01:16

## 2024-06-25 RX ADMIN — OXYCODONE HYDROCHLORIDE 5 MG: 5 TABLET ORAL at 20:54

## 2024-06-25 RX ADMIN — POTASSIUM CHLORIDE 40 MEQ: 1500 TABLET, EXTENDED RELEASE ORAL at 10:32

## 2024-06-25 RX ADMIN — PANTOPRAZOLE SODIUM 40 MG: 40 TABLET, DELAYED RELEASE ORAL at 06:24

## 2024-06-25 RX ADMIN — FLUOXETINE HYDROCHLORIDE 20 MG: 20 CAPSULE ORAL at 08:36

## 2024-06-25 RX ADMIN — LEVOTHYROXINE SODIUM 25 MCG: 0.03 TABLET ORAL at 06:24

## 2024-06-25 RX ADMIN — CEFEPIME HYDROCHLORIDE 2 G: 2 INJECTION, POWDER, FOR SOLUTION INTRAVENOUS at 13:40

## 2024-06-25 RX ADMIN — MEROPENEM 1 G: 1 INJECTION, POWDER, FOR SOLUTION INTRAVENOUS at 16:28

## 2024-06-25 RX ADMIN — ENOXAPARIN SODIUM 40 MG: 40 INJECTION SUBCUTANEOUS at 08:36

## 2024-06-25 RX ADMIN — ARIPIPRAZOLE 5 MG: 5 TABLET ORAL at 08:39

## 2024-06-25 RX ADMIN — BENZONATATE 100 MG: 100 CAPSULE ORAL at 22:45

## 2024-06-25 RX ADMIN — SIMVASTATIN 10 MG: 10 TABLET, FILM COATED ORAL at 20:46

## 2024-06-25 RX ADMIN — KETOROLAC TROMETHAMINE 15 MG: 15 INJECTION, SOLUTION INTRAMUSCULAR; INTRAVENOUS at 22:46

## 2024-06-25 ASSESSMENT — ACTIVITIES OF DAILY LIVING (ADL)
ADLS_ACUITY_SCORE: 27
ADLS_ACUITY_SCORE: 28
ADLS_ACUITY_SCORE: 27
ADLS_ACUITY_SCORE: 27
ADLS_ACUITY_SCORE: 26
ADLS_ACUITY_SCORE: 28
ADLS_ACUITY_SCORE: 26
ADLS_ACUITY_SCORE: 30
ADLS_ACUITY_SCORE: 28
ADLS_ACUITY_SCORE: 28
ADLS_ACUITY_SCORE: 27
ADLS_ACUITY_SCORE: 29
ADLS_ACUITY_SCORE: 28
ADLS_ACUITY_SCORE: 28
ADLS_ACUITY_SCORE: 30
ADLS_ACUITY_SCORE: 27
ADLS_ACUITY_SCORE: 28
ADLS_ACUITY_SCORE: 29
ADLS_ACUITY_SCORE: 27
ADLS_ACUITY_SCORE: 27
ADLS_ACUITY_SCORE: 28
ADLS_ACUITY_SCORE: 28
ADLS_ACUITY_SCORE: 30

## 2024-06-25 NOTE — PROGRESS NOTES
Place of Service:  [facility]     Reason for follow up: Left orchitis     SUBJECTIVE:  Events:  No acute events.     Patient reports left testicular pain/swelling is improving. Denies abdominal and bilateral flank pain, chills. SP catheter replaced 6/23, draining well.     Language Line Simona  utilized during entire visit.     OBJECTIVE:  PHYSICAL EXAM:  Temp: 98.6  F (37  C) Temp src: Oral BP: 122/70 Pulse: 80   Resp: 18 SpO2: 95 % O2 Device: None (Room air)    General: NAD, alert, cooperative  Head: normocephalic, without abnormality / atraumatic  Abdomen: soft, distended, non- tender, no suprapubic fullness, no suprapubic tenderness. No bilateral CVA tenderness. SP exit site is dry, without erythema or edema. SP catheter draining clear yellow urine.   Genitourinary: Uncircumcised penis without erythema or edema. Scrotum with mild left erythema, without excessive warmth, necrotic lesions or crepitus. Left testicle edematous and stable from 6/24 exam, mildly tender, without fluctuance. No right testicular edema or pain.  Musculoskeletal: moves all four extremities equally.   Psychological: alert and oriented, answers questions appropriately.     LABS:  Creatinine   Date Value Ref Range Status   06/25/2024 0.99 0.67 - 1.17 mg/dL Final   07/26/2016 0.8 0.7 - 1.3 mg/dL Final     WBC Count   Date Value Ref Range Status   06/25/2024 13.0 (H) 4.0 - 11.0 10e3/uL Final     Hemoglobin   Date Value Ref Range Status   06/23/2024 13.0 (L) 13.3 - 17.7 g/dL Final   06/08/2016 17.3 13.3 - 17.7 g/dL Final     Platelet Count   Date Value Ref Range Status   06/25/2024 338 150 - 450 10e3/uL Final       UA:  UA RESULTS:  Recent Labs   Lab Test 06/22/24  1512   COLOR Yellow   APPEARANCE Turbid*   URINEGLC Negative   URINEBILI Negative   URINEKETONE Negative   SG 1.019   UBLD 0.1 mg/dL*   URINEPH 6.0   PROTEIN 50*   NITRITE Negative   LEUKEST 500 Zeeshan/uL*   RBCU 2   WBCU 172*     Cultures:  Urine 6/22, prelim: >100k  E.coli, >100k Pseudomonas aeruginosa   Susceptibility       Escherichia coli Pseudomonas aeruginosa     CALLIE (Preliminary) CALLIE     Amikacin   <=2 ug/mL Susceptible     Ampicillin >=32 ug/mL Resistant       Ampicillin/ Sulbactam >=32 ug/mL Resistant       Cefazolin 32 ug/mL Resistant 1       Cefepime   2 ug/mL Susceptible     Cefoxitin <=4 ug/mL Susceptible       Ceftazidime   4 ug/mL Susceptible     Ciprofloxacin >=4 ug/mL Resistant <=0.25 ug/mL Susceptible     Gentamicin <=1 ug/mL Susceptible       Levofloxacin >=8 ug/mL Resistant <=0.12 ug/mL Susceptible     Meropenem   <=0.25 ug/mL Susceptible     Nitrofurantoin <=16 ug/mL Susceptible       Piperacillin/Tazobactam <=4 ug/mL Susceptible 8 ug/mL Susceptible     Tobramycin <=1 ug/mL Susceptible <=1 ug/mL Susceptible     Trimethoprim/Sulfamethoxazole >16/304 ug/mL Resistant        Blood 6/24: NGTD  Blood 6/22: NGTD    Lab Results: personally reviewed.     ASSESSMENT/PLAN:  Rere Robbins is being seen by [facility] for left orchitis     - 69 yr old male admitted 6/22 with 3 day hx of left testicular pain. US 6/22 consistent with left orchitis. CT 6/22 showed mild prostate enlargement, bilateral renal cysts and no hydronephrosis. IV ceftriaxone initiated 6/22  - WBC 13.0 today, 12.2 on 6/24. UC: E.coli (multi-resistance) and pseudomonas. BC: NGTD. Last fever 6/23 PM. Pain stable/improving. Prostate non-tender on exam 6/24. ID following, covering with IV cefepime. Appreciate ID recs.   - Discussed with patient that it may take weeks for complete resolution of left testicle pain/edema.   - Ice prn, elevate scrotum with towel rolled beneath scrotum, recommend support underwear (jock-strap) when ambulating.   - SP catheter next exchange due 1 month from 6/23/24.   - Message sent to [facility] schedulers to arrange for outpatient follow up in 2 weeks. [facility] contact info added to discharge orders.   - [facility] will sign off. Please re-consult with any new or  worsening urologic concerns.      Handy Dominguez, APRN, CNP  [facility]   [phone]     ADDENDUM,   Pt has appt scheduled with Dr. Sullivan on 7/11, maintain for follow up orchitis and urethral stricture. No new appt needed.     Handy Dominguez, CNP

## 2024-06-25 NOTE — PROGRESS NOTES
"[facility] Hospitalist Progress Note  [facility]  Admission date: 6/22/2024    Summary:    69M with urethral stricture, PTSD, memory impairment, hx psychosis s/p SP catheter who presents with 2 days of left-sided testicular pain and lower abdominal pain. Found to have left-sided orchitis.      Assessment/Plan    #Acute orchitis, left-sided with sepsis  #Possible CAUTI with chronic SP tube  2 days of left-sided testicular pain and lower abdominal pain with chronic SP tube. Developed chills at home. Here with leukocytosis to 16. UA infected looking but from SP tube. CT A/P unremarkable, but testicular US showing orchitis. Low concern for gangrene on exam and on CT. Considered mumps but no evidence of salivary gland swelling, joint pain, headache.  -urine cultures with pseudomonas and MDR e.coli.  -?of ESBL and changed cefepime to merrem  - SP catheter exchange 6/23  - ongoing testicular pain could take weeks to resolve.     #Primary HTN - Continue home amlodipine     #Depression   #hx psychosis  - Continue home Prozac and Abilify  - Continue home trazodone     #Hypothyroidism  - Continue home leovthyroxine     #GERD  - Continue home PPI    Checklist:  Code Status: Full Code    Diet: Combination Diet Regular Diet Adult     DVT px:  Enoxaparin (Lovenox) SQ    Disposition and Discharge Planning    Medically Ready for Discharge: later today vs. Tomorrow?  Depends on Abx plan.      System Identified Risk Variables  Auto-populated based on system request - if needs to be addressed in treatment plan they will be addressed above:  \"  Clinically Significant Risk Factors                                 # Overweight: Estimated body mass index is 26.73 kg/m  as calculated from the following:    Height as of this encounter: 1.575 m (5' 2\").    Weight as of this encounter: 66.3 kg (146 lb 2.6 oz)., PRESENT ON ADMISSION            \"    Interval Events/Subjective/Notable results:    Seen with interpretor.  L testicle " still painful but improving.  Also low back pain.    Reviewed: CRP, UC, WBC, plat, cr, d/w ID      Objective    Vital signs in last 24 hours  Temp:  [97.9  F (36.6  C)-99.6  F (37.6  C)] 98.6  F (37  C)  Pulse:  [79-85] 80  Resp:  [17-19] 18  BP: (122-155)/(70-81) 122/70  SpO2:  [94 %-96 %] 95 % O2 Device: None (Room air)    Weight:   146 lbs 2.64 oz  Body mass index is 26.73 kg/m .    Intake/Output last 3 shifts  I/O last 3 completed shifts:  In: 680 [P.O.:680]  Out: 1425 [Urine:1425]    Physical Exam  General:  Alert, cooperative, no distress    Neurologic:  oriented, facial symmetry preserved, fluent speech.   Psych: calm  HEENT:  Anicteric, MMM  CV:  no edema  Lungs:  Easyrespirations  : L scrotal TTP.  No findings suggestive of necrotizing infection  Skin: no rashes noted on exposed skin.    Chase Catheter: Not present  Lines: None          Medical Decision Making             Kacey Zamora MD, [facility]  Internal Medicine Hospitalist  6/25/2024

## 2024-06-25 NOTE — PLAN OF CARE
Goal Outcome Evaluation:  Pt is alert and oriented. Able to communicate needs via Simona . No c/o pain. Up in bathroom with assist of one. Suprapubic catheter is intact and drain well. Vs is stable. Informed Dr Zamora for evaluation of hernia like abdominal appearance. No tender or pain with touching.

## 2024-06-25 NOTE — PROGRESS NOTES
"[facility] Hospitalist Progress Note  [facility]  Admission date: 6/22/2024    Summary:    69M with urethral stricture, PTSD, memory impairment, hx psychosis s/p SP catheter who presents with 2 days of left-sided testicular pain and lower abdominal pain. Found to have left-sided orchitis.      Assessment/Plan    #Acute orchitis, left-sided with sepsis  #Possible CAUTI with chronic SP tube  2 days of left-sided testicular pain and lower abdominal pain with chronic SP tube. Developed chills at home. Here with leukocytosis to 16. UA infected looking but from SP tube. CT A/P unremarkable, but testicular US showing orchitis. Low concern for gangrene on exam and on CT. Considered mumps but no evidence of salivary gland swelling, joint pain, headache.  -urine cultures with pseudomonas and MDR e.coli.  -cefepime.    - SP catheter exchange 6/23  - ongoing testicular pain could take weeks to resolve.     #Primary HTN - Continue home amlodipine     #Depression   #hx psychosis  - Continue home Prozac and Abilify  - Continue home trazodone     #Hypothyroidism  - Continue home leovthyroxine     #GERD  - Continue home PPI    Checklist:  Code Status: Full Code    Diet: Combination Diet Regular Diet Adult     DVT px:  Enoxaparin (Lovenox) SQ    Disposition and Discharge Planning    Medically Ready for Discharge: later today vs. Tomorrow?  Depends on Abx plan.      System Identified Risk Variables  Auto-populated based on system request - if needs to be addressed in treatment plan they will be addressed above:  \"  Clinically Significant Risk Factors                                 # Overweight: Estimated body mass index is 26.73 kg/m  as calculated from the following:    Height as of this encounter: 1.575 m (5' 2\").    Weight as of this encounter: 66.3 kg (146 lb 2.6 oz)., PRESENT ON ADMISSION            \"    Interval Events/Subjective/Notable results:    Seen with interpretor.  L testicle still painful but improving.  " Also low back pain.    Reviewed: CRP, UC, WBC, plat, cr      Objective    Vital signs in last 24 hours  Temp:  [97.9  F (36.6  C)-99.6  F (37.6  C)] 98.6  F (37  C)  Pulse:  [79-85] 80  Resp:  [17-19] 18  BP: (122-155)/(70-81) 122/70  SpO2:  [94 %-96 %] 95 % O2 Device: None (Room air)    Weight:   146 lbs 2.64 oz  Body mass index is 26.73 kg/m .    Intake/Output last 3 shifts  I/O last 3 completed shifts:  In: 480 [P.O.:480]  Out: 1900 [Urine:1900]    Physical Exam  General:  Alert, cooperative, no distress    Neurologic:  oriented, facial symmetry preserved, fluent speech.   Psych: calm  HEENT:  Anicteric, MMM  CV:  no edema  Lungs:  Easyrespirations  : L scrotal TTP.  No findings suggestive of necrotizing infection  Skin: no rashes noted on exposed skin.    Chase Catheter: Not present  Lines: None          Medical Decision Making             Kacey Zamora MD, [facility]  Internal Medicine Hospitalist  6/25/2024

## 2024-06-25 NOTE — PLAN OF CARE
Problem: Adult Inpatient Plan of Care  Goal: Optimal Comfort and Wellbeing  Outcome: Progressing  Problem: Sepsis/Septic Shock  Goal: Absence of Bleeding  Outcome: Progressing  Problem: Pain Acute  Goal: Optimal Pain Control and Function  Outcome: Progressing     Simona  utilized for cares. AO, VSS on RA, denies pain this shift. Up with SBA to bathroom. Abdomen rounded, taut. Suprapubic catheter dressing CDI. No acute events reported at this time.    Aly Christian RN

## 2024-06-25 NOTE — PROGRESS NOTES
[facility]  Infectious Disease   Progress Note     Date of Admission:  6/22/2024    Assessment & Plan   L orchitis: testicular US with orchitis. Clinically improving. Leukocytosis better. Urology following.  CT 6/22 showed mild prostate enlargement, bilateral renal cysts and no hydronephrosis.   Suprapubic catheter: UA difficult to interpret in this setting.   UA with pyuria, UC with pseudomonas, and potentially ESBL E coli      PLAN  Discontinue CTX  Iv cefepime: discontinue , start meropenem  Labs in AM  Trend temps    Shan Britt M.D.    ______________________________________________________________________    Interval History   No evnts    Past medical, family, and social history reviewed, unchanged from before.      Physical Exam   Vital Signs: Temp: 97.8  F (36.6  C) Temp src: Oral BP: 138/77 Pulse: 93   Resp: 18 SpO2: 98 % O2 Device: None (Room air)    Weight: 146 lbs 2.64 oz  Gen. appearance nontoxic  Eyes no conjunctivitis or icterus  Neck no stiffness or neck vein distention, no LN  Heart  No edema  Lungs breathing comfortably    Abdomen soft not tender  Extremities no synovitis, trace edema   some swelling tenderness  Urology: Prostate enlarged and non-tender on CHRISTY.     Skin  no rash or emboli  Neurologic alert oriented no focal deficits      Data   Results for orders placed or performed during the hospital encounter of 06/22/24 (from the past 24 hour(s))   Lactic Acid Whole Blood w/ 1x repeat in 2 hrs when >2   Result Value Ref Range    Lactic Acid, Initial 0.7 0.7 - 2.0 mmol/L   Platelet count   Result Value Ref Range    Platelet Count 338 150 - 450 10e3/uL   Creatinine   Result Value Ref Range    Creatinine 0.99 0.67 - 1.17 mg/dL    GFR Estimate 83 >60 mL/min/1.73m2   Potassium   Result Value Ref Range    Potassium 3.4 3.4 - 5.3 mmol/L   CRP inflammation   Result Value Ref Range    CRP Inflammation 53.90 (H) <5.00 mg/L   WBC count   Result Value Ref Range    WBC Count  13.0 (H) 4.0 - 11.0 10e3/uL   Potassium   Result Value Ref Range    Potassium 4.1 3.4 - 5.3 mmol/L

## 2024-06-25 NOTE — PLAN OF CARE
"Blood pressure (!) 143/74, pulse 82, temperature 98.6  F (37  C), temperature source Oral, resp. rate 18, height 1.575 m (5' 2\"), weight 66.3 kg (146 lb 2.6 oz), SpO2 94%.    A&O x4. Calm and cooperative.  Speaks Simona. Pt denies pain.  Standby assist.  Plan of care ongoing.                 "

## 2024-06-25 NOTE — PROGRESS NOTES
"Care Management Follow Up    Length of Stay (days): 3    Expected Discharge Date: 06/25/2024     Concerns to be Addressed: discharge planning     Patient plan of care discussed at interdisciplinary rounds: Yes    Anticipated Discharge Disposition:  home      Anticipated Discharge Services:  resumption of home care   Anticipated Discharge DME:  per treatment team    Patient/family educated on Medicare website which has current facility and service quality ratings:  NA  Education Provided on the Discharge Plan:  yes  Patient/Family in Agreement with the Plan:  yes    Referrals Placed by CM/SW:  post acute care facilities   Private pay costs discussed: Not applicable    Additional Information:  SW received call from [facility] care coordinator, Emani ph: [phone] and [facility] worker, Kimberly ph: [phone]. SW briefly updated care coordinators regarding questions they had. Kimberly appreciates a call when patient is discharging home.     Anticipate patient will return home when medically ready for discharge. CM following care progression to assist with safe discharge planning.     Social History:  Pah lives in a house with his granddaughter Geraldo Reddy and other extended family. His granddaughter Geraldo Reddy is his PCA for help with most ADLs and all IADLs. \"He mostly uses his cane. Also has a walker if needed\". He also has help from [facility] RN for weekly medication set up. Family to transport at discharge.     JUSTINE Domingo at 2:03 PM on 06/25/24       "

## 2024-06-26 ENCOUNTER — HOME INFUSION (PRE-WILLOW HOME INFUSION) (OUTPATIENT)
Dept: PHARMACY | Facility: CLINIC | Age: 69
End: 2024-06-26
Payer: COMMERCIAL

## 2024-06-26 VITALS
OXYGEN SATURATION: 95 % | RESPIRATION RATE: 18 BRPM | HEIGHT: 62 IN | HEART RATE: 74 BPM | BODY MASS INDEX: 26.9 KG/M2 | TEMPERATURE: 98.5 F | DIASTOLIC BLOOD PRESSURE: 65 MMHG | WEIGHT: 146.16 LBS | SYSTOLIC BLOOD PRESSURE: 119 MMHG

## 2024-06-26 LAB
ATRIAL RATE - MUSE: 76 BPM
BACTERIA UR CULT: ABNORMAL
BACTERIA UR CULT: ABNORMAL
DIASTOLIC BLOOD PRESSURE - MUSE: NORMAL MMHG
INTERPRETATION ECG - MUSE: NORMAL
P AXIS - MUSE: 34 DEGREES
POTASSIUM SERPL-SCNC: 3.7 MMOL/L (ref 3.4–5.3)
PR INTERVAL - MUSE: 144 MS
QRS DURATION - MUSE: 84 MS
QT - MUSE: 404 MS
QTC - MUSE: 454 MS
R AXIS - MUSE: -2 DEGREES
SYSTOLIC BLOOD PRESSURE - MUSE: NORMAL MMHG
T AXIS - MUSE: 13 DEGREES
VENTRICULAR RATE- MUSE: 76 BPM

## 2024-06-26 PROCEDURE — 93010 ELECTROCARDIOGRAM REPORT: CPT | Performed by: INTERNAL MEDICINE

## 2024-06-26 PROCEDURE — 99231 SBSQ HOSP IP/OBS SF/LOW 25: CPT | Performed by: INTERNAL MEDICINE

## 2024-06-26 PROCEDURE — 93005 ELECTROCARDIOGRAM TRACING: CPT

## 2024-06-26 PROCEDURE — 84132 ASSAY OF SERUM POTASSIUM: CPT | Performed by: HOSPITALIST

## 2024-06-26 PROCEDURE — 36415 COLL VENOUS BLD VENIPUNCTURE: CPT | Performed by: HOSPITALIST

## 2024-06-26 PROCEDURE — 250N000013 HC RX MED GY IP 250 OP 250 PS 637: Performed by: STUDENT IN AN ORGANIZED HEALTH CARE EDUCATION/TRAINING PROGRAM

## 2024-06-26 PROCEDURE — 250N000011 HC RX IP 250 OP 636: Mod: JZ | Performed by: HOSPITALIST

## 2024-06-26 PROCEDURE — 250N000011 HC RX IP 250 OP 636: Mod: JZ | Performed by: STUDENT IN AN ORGANIZED HEALTH CARE EDUCATION/TRAINING PROGRAM

## 2024-06-26 PROCEDURE — 99239 HOSP IP/OBS DSCHRG MGMT >30: CPT | Performed by: HOSPITALIST

## 2024-06-26 RX ORDER — LEVOFLOXACIN 750 MG/1
750 TABLET, FILM COATED ORAL DAILY
Qty: 10 TABLET | Refills: 0 | Status: SHIPPED | OUTPATIENT
Start: 2024-06-26 | End: 2024-07-06

## 2024-06-26 RX ORDER — CEFDINIR 300 MG/1
300 CAPSULE ORAL 2 TIMES DAILY
Qty: 20 CAPSULE | Refills: 0 | Status: SHIPPED | OUTPATIENT
Start: 2024-06-26 | End: 2024-07-06

## 2024-06-26 RX ORDER — ACETAMINOPHEN 325 MG/1
975 TABLET ORAL 3 TIMES DAILY PRN
Status: SHIPPED
Start: 2024-06-26

## 2024-06-26 RX ADMIN — ENOXAPARIN SODIUM 40 MG: 40 INJECTION SUBCUTANEOUS at 08:21

## 2024-06-26 RX ADMIN — MEROPENEM 1 G: 1 INJECTION, POWDER, FOR SOLUTION INTRAVENOUS at 08:21

## 2024-06-26 RX ADMIN — PANTOPRAZOLE SODIUM 40 MG: 40 TABLET, DELAYED RELEASE ORAL at 06:05

## 2024-06-26 RX ADMIN — AMLODIPINE BESYLATE 5 MG: 5 TABLET ORAL at 08:21

## 2024-06-26 RX ADMIN — LEVOTHYROXINE SODIUM 25 MCG: 0.03 TABLET ORAL at 06:05

## 2024-06-26 RX ADMIN — FLUOXETINE HYDROCHLORIDE 20 MG: 20 CAPSULE ORAL at 08:21

## 2024-06-26 RX ADMIN — MEROPENEM 1 G: 1 INJECTION, POWDER, FOR SOLUTION INTRAVENOUS at 01:11

## 2024-06-26 RX ADMIN — ARIPIPRAZOLE 5 MG: 5 TABLET ORAL at 08:21

## 2024-06-26 ASSESSMENT — ACTIVITIES OF DAILY LIVING (ADL)
ADLS_ACUITY_SCORE: 28
ADLS_ACUITY_SCORE: 29
ADLS_ACUITY_SCORE: 28
ADLS_ACUITY_SCORE: 29
ADLS_ACUITY_SCORE: 28
ADLS_ACUITY_SCORE: 28
ADLS_ACUITY_SCORE: 29
ADLS_ACUITY_SCORE: 28
ADLS_ACUITY_SCORE: 28
ADLS_ACUITY_SCORE: 29
ADLS_ACUITY_SCORE: 28

## 2024-06-26 NOTE — PLAN OF CARE
"  Problem: Sepsis/Septic Shock  Goal: Absence of Infection Signs and Symptoms  Outcome: Progressing  Intervention: Initiate Sepsis Management  Recent Flowsheet Documentation  Taken 6/25/2024 1628 by Kavon Ford RN  Infection Prevention: hand hygiene promoted     Problem: Pain Acute  Goal: Optimal Pain Control and Function  Intervention: Prevent or Manage Pain  Recent Flowsheet Documentation  Taken 6/25/2024 1628 by Kavon Ford RN  Medication Review/Management: medications reviewed   Goal Outcome Evaluation:      Plan of Care Reviewed With: patient    Overall Patient Progress: improvingOverall Patient Progress: improving    A&O x3, able to use call light and express needs to staff through an . Patient is Simona speaking. Denied any pain most of the shift but did c/o severe pain in the 2nd toe of his L foot, given PRN oxycodone. Continues on IV abx. Chronic suprapubic catheter in place and is draining straw colored urine. Plan is for patient to discharge home with home health when he is medically cleared. No acute changes this shift.  /77 (BP Location: Left arm)   Pulse 93   Temp 97.8  F (36.6  C) (Oral)   Resp 18   Ht 1.575 m (5' 2\")   Wt 66.3 kg (146 lb 2.6 oz)   SpO2 98%   BMI 26.73 kg/m         "

## 2024-06-26 NOTE — PROGRESS NOTES
Patient c/o severe pain in the 2nd toe of his left foot. The toe has no obvious signs of injury, no redness but is slightly inflamed around the joint. Patient also has a new infrequent dry, non-productive cough. Lungs sound clear. Provider updated. See orders.

## 2024-06-26 NOTE — PROGRESS NOTES
"[facility] Hospitalist Progress Note  [facility]  Admission date: 6/22/2024    Summary:    69M with urethral stricture, PTSD, memory impairment, hx psychosis s/p SP catheter who presents with 2 days of left-sided testicular pain and lower abdominal pain. Found to have left-sided orchitis.      Assessment/Plan    #Acute orchitis, left-sided with sepsis  #Possible CAUTI with chronic SP tube  2 days of left-sided testicular pain and lower abdominal pain with chronic SP tube. Developed chills at home. Here with leukocytosis to 16. UA infected looking but from SP tube. CT A/P unremarkable, but testicular US showing orchitis. Low concern for gangrene on exam and on CT. Considered mumps but no evidence of salivary gland swelling, joint pain, headache.  -urine cultures with pseudomonas and MDR e.coli.  -?of ESBL and changed cefepime to merrem  - SP catheter exchange 6/23     #Primary HTN - Continue home amlodipine     #Depression   #hx psychosis  - Continue home Prozac and Abilify  - Continue home trazodone     #Hypothyroidism  - Continue home leovthyroxine     #GERD  - Continue home PPI    Checklist:  Code Status: Full Code    Diet: Combination Diet Regular Diet Adult     DVT px:  Enoxaparin (Lovenox) SQ    Disposition and Discharge Planning    Medically Ready for Discharge: later today vs. Tomorrow?  Depends on Abx plan.      System Identified Risk Variables  Auto-populated based on system request - if needs to be addressed in treatment plan they will be addressed above:  \"  Clinically Significant Risk Factors                                 # Overweight: Estimated body mass index is 26.73 kg/m  as calculated from the following:    Height as of this encounter: 1.575 m (5' 2\").    Weight as of this encounter: 66.3 kg (146 lb 2.6 oz)., PRESENT ON ADMISSION            \"    Interval Events/Subjective/Notable results:    Seen with interpretor. Feels ready for discharge    Reviewed:  cultures, K,   Discussed dispo " plan with care management    Objective    Vital signs in last 24 hours  Temp:  [97.8  F (36.6  C)-98.5  F (36.9  C)] 98.5  F (36.9  C)  Pulse:  [74-93] 74  Resp:  [18] 18  BP: (107-138)/(63-77) 119/65  SpO2:  [95 %-98 %] 95 % O2 Device: None (Room air)    Weight:   146 lbs 2.64 oz  Body mass index is 26.73 kg/m .    Intake/Output last 3 shifts  I/O last 3 completed shifts:  In: 600 [P.O.:600]  Out: 1000 [Urine:1000]    Physical Exam  General:  Alert, cooperative, no distress    Neurologic:  oriented, facial symmetry preserved, fluent speech.   Psych: calm  HEENT:  Anicteric, MMM  CV:  no edema  Lungs:  respirations  Chase Catheter: Not present  Lines: None          Medical Decision Making             Kacey Zamora MD, [facility]  Internal Medicine Hospitalist  6/25/2024

## 2024-06-26 NOTE — PROGRESS NOTES
Therapy: IV abx  Insurance: [facility] [facility]     Pt has coverage for IV abx with [facility] [facility] plan, coverage should at 100%, no ded or copay applies.    In reference to admission on 6/22/24 to check IV abx coverage    Please contact Intake with any questions, [phone] or In Basket pool, [facility] ([phone]).

## 2024-06-26 NOTE — PROGRESS NOTES
[facility]  Infectious Disease   Progress Note     Date of Admission:  6/22/2024    Assessment & Plan   L orchitis: testicular US with orchitis. Clinically improving. Leukocytosis better. Urology following.  CT 6/22 showed mild prostate enlargement, bilateral renal cysts and no hydronephrosis.   Suprapubic catheter: UA difficult to interpret in this setting.   UA with pyuria, UC with pseudomonas, and not esbl      PLAN  Home on po levaquin and cefdinir x 10 days    Will sign off, please call with questions      Shan Britt M.D.    ______________________________________________________________________    Interval History   No evnts  Better    Past medical, family, and social history reviewed, unchanged from before.      Physical Exam   Vital Signs: Temp: 98.5  F (36.9  C) Temp src: Oral BP: 119/65 Pulse: 74   Resp: 18 SpO2: 95 % O2 Device: None (Room air)    Weight: 146 lbs 2.64 oz  Gen. appearance nontoxic  Eyes no conjunctivitis or icterus  Neck no stiffness or neck vein distention, no LN  Heart  No edema  Lungs breathing comfortably    Abdomen soft not tender  Extremities no synovitis, trace edema   some swelling tenderness  Urology: Prostate enlarged and non-tender on CHRISTY.   urine  Skin  no rash or emboli  Neurologic alert oriented no focal deficits      Data   Results for orders placed or performed during the hospital encounter of 06/22/24 (from the past 24 hour(s))   Potassium   Result Value Ref Range    Potassium 4.1 3.4 - 5.3 mmol/L   Uric acid   Result Value Ref Range    Uric Acid 6.6 3.4 - 7.0 mg/dL   Potassium   Result Value Ref Range    Potassium 3.7 3.4 - 5.3 mmol/L

## 2024-06-26 NOTE — DISCHARGE SUMMARY
[facility] MEDICINE  DISCHARGE SUMMARY     Primary Care Physician: Coty Calix  Admission Date: 6/22/2024   Discharge Provider: Kacey Zamora MD Discharge Date: 6/26/2024   Diet:   Active Diet and Nourishment Order   Procedures    Combination Diet Regular Diet Adult    Diet       Code Status: Full Code   Activity: DCACTIVITY: Activity as tolerated        Condition at Discharge: Stable     REASON FOR PRESENTATION(See Admission Note for Details)     Scrotal pain    PRINCIPAL & ACTIVE DISCHARGE DIAGNOSES     Active Problems:    Orchitis and epididymitis    History of diabetes mellitus    Sepsis, due to unspecified organism, unspecified whether acute organ dysfunction present (H)      PENDING LABS     Unresulted Labs Ordered in the Past 30 Days of this Admission       Date and Time Order Name Status Description    6/24/2024 12:10 AM Blood Culture Peripheral Blood Preliminary     6/24/2024 12:10 AM Blood Culture Peripheral Blood Preliminary     6/22/2024  2:57 PM Blood Culture Peripheral Blood Preliminary     6/22/2024  2:57 PM Blood Culture Hand, Left Preliminary               PROCEDURES ( this hospitalization only)          RECOMMENDATIONS TO OUTPATIENT PROVIDER FOR F/U VISIT     Follow-up Appointments     Follow-up and recommended labs and tests       [facility] will call you to arrange follow up in 2 weeks for orchitis.   You may call to confirm appointment as needed ([phone]).        Follow-up and recommended labs and tests       Follow up with primary care provider, Coty Calix, within 7 days for   hospital follow- up.              DISPOSITION     Home with home care    SUMMARY OF HOSPITAL COURSE:      69M with urethral stricture, PTSD, memory impairment, hx psychosis s/p SP catheter who presents with 2 days of left-sided testicular pain and lower abdominal pain.  Found to have left-sided orchitis.  Improved with IV abx and discharged back to home with resumption of  home care services.     Assessment/Plan     #Acute orchitis, left-sided with sepsis  #Possible CAUTI with chronic SP tube  -SP catheter exchanged 6/23  -urine cultures with pseudomonas and MDR e.coli.  -omnicef and levaquin x 10 days     #Primary HTN - Continue home amlodipine     #Depression   #hx psychosis  - Continue home Prozac and Abilify  - Continue home trazodone     #Hypothyroidism  - Continue home leovthyroxine     #GERD  - Continue home PPI       Discharge Medications with Med changes:     Current Discharge Medication List        START taking these medications    Details   acetaminophen (TYLENOL) 325 MG tablet Take 3 tablets (975 mg) by mouth 3 times daily as needed for mild pain    Associated Diagnoses: Orchitis and epididymitis      cefdinir (OMNICEF) 300 MG capsule Take 1 capsule (300 mg) by mouth 2 times daily for 10 days  Qty: 20 capsule, Refills: 0    Associated Diagnoses: Orchitis and epididymitis      levofloxacin (LEVAQUIN) 750 MG tablet Take 1 tablet (750 mg) by mouth daily for 10 days  Qty: 10 tablet, Refills: 0    Associated Diagnoses: Orchitis and epididymitis           CONTINUE these medications which have NOT CHANGED    Details   amLODIPine (NORVASC) 5 MG tablet Take 5 mg by mouth daily      ARIPiprazole (ABILIFY) 5 MG tablet Take 5 mg by mouth daily      diclofenac (VOLTAREN) 1 % topical gel Apply 2 g topically 4 times daily as needed for moderate pain (right knee)      ferrous gluconate (FERGON) 324 (38 Fe) MG tablet Take 1 tablet by mouth at bedtime      fish oil-omega-3 fatty acids 1000 MG capsule Take 2 g by mouth daily      FLUoxetine (PROZAC) 20 MG capsule Take 20 mg by mouth daily      levothyroxine (SYNTHROID, LEVOTHROID) 25 MCG tablet [LEVOTHYROXINE (SYNTHROID, LEVOTHROID) 25 MCG TABLET] Take 1 tablet by mouth daily.      multivitamin (ONE A DAY) per tablet Take 1 tablet by mouth daily      omeprazole (PRILOSEC) 20 MG capsule Take 20 mg by mouth daily      simvastatin (ZOCOR) 10 MG  tablet Take 10 mg by mouth at bedtime      traZODone (DESYREL) 50 MG tablet Take  mg by mouth at bedtime      VITAMIN D3 2,000 unit capsule Take 4,000 Units by mouth daily  Refills: 3           STOP taking these medications       acetaminophen (TYLENOL) 650 MG CR tablet Comments:   Reason for Stopping:                     Rationale for medication changes:            Consults     PHARMACY TO DOSE [facility]  CARE MANAGEMENT / SOCIAL WORK IP CONSULT  PHARMACY TO DOSE [facility]  INFECTIOUS DISEASES IP CONSULT    Immunizations given this encounter     Most Recent Immunizations   Administered Date(s) Administered    COVID-19 MONOVALENT 12+ (Pfizer) 05/02/2022    COVID-19 Monovalent 18+ (Moderna) 09/28/2021    Influenza Vaccine >6 months,quad, PF 10/08/2016    Influenza Vaccine, 6+MO IM (QUADRIVALENT W/PRESERVATIVES) 10/17/2017           Anticoagulation Information          SIGNIFICANT IMAGING FINDINGS     Results for orders placed or performed during the hospital encounter of 06/22/24   US Testicular & Scrotum w Doppler Ltd    Impression    IMPRESSION:  1.  There is heterogeneity of the left testicular echotexture with increased vascularity suggesting orchitis. Small likely reactive left hydrocele.  2.  Small cysts within the right epididymal head and right testicle. Remainder unremarkable.   CT Abdomen Pelvis w Contrast    Impression    IMPRESSION:   1.  There are no kidney stones or hydronephrosis. Bilateral renal cysts require no specific follow-up.  2.  Suprapubic Chase catheter within the decompressed urinary bladder. No complications visualized.  3.  There is no evidence for bowel obstruction or inflammation.  4.  Hepatic steatosis.  5.  Multiple old complex fractures involving the pelvic bones.  6.  6 mm nodule right middle lobe is stable compared to the most recent study however older studies to confirm long-term stability. Recommend a follow-up chest CT in 6 months.         Discharge Orders        Home Care  Referral      Follow-up and recommended labs and tests     [facility] will call you to arrange follow up in 2 weeks for orchitis. You may call to confirm appointment as needed ([phone]).     Reason for your hospital stay    You were admitted with UTI and infection of L testicle     Follow-up and recommended labs and tests     Follow up with primary care provider, Coty Calix, within 7 days for hospital follow- up.     Activity    Your activity upon discharge: activity as tolerated     Diet    Follow this diet upon discharge: Orders Placed This Encounter      Combination Diet Regular Diet Adult       Examination   Physical Exam   Temp:  [97.8  F (36.6  C)-98.5  F (36.9  C)] 98.5  F (36.9  C)  Pulse:  [74-93] 74  Resp:  [18] 18  BP: (107-138)/(63-77) 119/65  SpO2:  [95 %-98 %] 95 %  Wt Readings from Last 1 Encounters:   06/22/24 66.3 kg (146 lb 2.6 oz)         Please see EMR for more detailed significant labs, imaging, consultant notes etc.    IKacey MD, personally saw the patient today and spent greater than 30 minutes discharging this patient.    Kacey Zamora MD  [facility]    CC:Coty Calix

## 2024-06-26 NOTE — PLAN OF CARE
Goal Outcome Evaluation:    Pt is discharging home via grand daughter.  Explained AVS to grand daughter.  Send home with pt urology bag which contains leg bag, regular fair drainage bag as well  instruction for care.

## 2024-06-26 NOTE — PROGRESS NOTES
Care Management Discharge Note    Discharge Date: 06/26/2024       Discharge Disposition: Home Care    Discharge Services: None    Discharge DME: None    Discharge Transportation: family or friend will provide    Private pay costs discussed: Not applicable    Does the patient's insurance plan have a 3 day qualifying hospital stay waiver?  No    PAS Confirmation Code: NA  Patient/family educated on Medicare website which has current facility and service quality ratings:      Education Provided on the Discharge Plan: Yes  Persons Notified of Discharge Plans: per care team  Patient/Family in Agreement with the Plan: yes    Handoff Referral Completed: Yes    Additional Information:  Patient discharging home on oral antibiotics with resumption of home care RN through [facility].  Discharge orders sent via EPIC.    CM left voicemail for ALDAIR mirKimberly ph: [phone]. re patient's discharge today.    Will resume PCA services at home as well.    No additional CM needs identified.     Liv Chaidez RN

## 2024-06-26 NOTE — PLAN OF CARE
"Blood pressure 107/63, pulse 83, temperature 98  F (36.7  C), temperature source Oral, resp. rate 18, height 1.575 m (5' 2\"), weight 66.3 kg (146 lb 2.6 oz), SpO2 95%.    A&O x4.  Denies pain.  Calm and cooperative.  Slept most of the night.  Speaks Simona.  1 assist to restroom.  Plan of ongoing.                   "

## 2024-06-26 NOTE — PROGRESS NOTES
"Care Management Follow Up    Length of Stay (days): 4    Expected Discharge Date: 06/26/2024     Concerns to be Addressed: discharge planning     Patient plan of care discussed at interdisciplinary rounds: Yes    Anticipated Discharge Disposition:  home      Anticipated Discharge Services:  resumption of home care, possible IV abx  Anticipated Discharge DME:  per treatment team    Patient/family educated on Medicare website which has current facility and service quality ratings:  NA  Education Provided on the Discharge Plan:  yes  Patient/Family in Agreement with the Plan:  yes    Referrals Placed by CM/SW:  home care, home infusion   Private pay costs discussed: Not applicable    Additional Information:  Awaiting final antibiotic plan from ID.  Currently on IV meropenem.  CM sent referral to [facility] for benefit check.   Update: discharging on oral antibiotics.  CM updated [facility], disregard referral.    Patient will need resumption of home care services (has RN).    Social History:  Reer lives in a house with his granddaughter Geraldo Reddy and other extended family. His granddaughter Geraldo Reddy is his PCA for help with most ADLs and all IADLs. \"He mostly uses his cane. Also has a walker if needed\". He also has help from [facility] RN for weekly medication set up. Family to transport at discharge.     Contacts:   [facility] Care Coordinator, Emani ph: [phone]  CADI worker, Kimberly ph: [phone]. (please call when patient discharges home)    Liv Chaidez RN at 2:03 PM on 06/25/24       "

## 2024-06-27 LAB
BACTERIA BLD CULT: NO GROWTH
BACTERIA BLD CULT: NO GROWTH

## 2024-06-29 LAB
BACTERIA BLD CULT: NO GROWTH
BACTERIA BLD CULT: NO GROWTH

## 2024-07-02 ENCOUNTER — TELEPHONE (OUTPATIENT)
Dept: CARE COORDINATION | Facility: CLINIC | Age: 69
End: 2024-07-02
Payer: COMMERCIAL

## 2024-07-02 NOTE — TELEPHONE ENCOUNTER
Care Coordination: 7/2/2024    Support call offering an in clinic follow up appointment with his primary care physician after an emergency room visit at [facility]. However, his grand daughter declined the appointment.         Jeffrey Perez Sr.   Care Coordination  [address] [address]  [email or web address]   Office: [phone] Direct: [phone]  [facility]

## 2024-07-09 ENCOUNTER — TRANSFERRED RECORDS (OUTPATIENT)
Dept: HEALTH INFORMATION MANAGEMENT | Facility: CLINIC | Age: 69
End: 2024-07-09

## 2024-07-30 ENCOUNTER — HOSPITAL ENCOUNTER (OUTPATIENT)
Dept: RADIOLOGY | Facility: HOSPITAL | Age: 69
Discharge: HOME | End: 2024-07-30
Attending: STUDENT IN AN ORGANIZED HEALTH CARE EDUCATION/TRAINING PROGRAM
Payer: COMMERCIAL

## 2024-07-30 DIAGNOSIS — N35.013 POST-TRAUMATIC ANTERIOR URETHRAL STRICTURE: ICD-10-CM

## 2024-07-30 PROCEDURE — 51600 INJECTION FOR BLADDER X-RAY: CPT

## 2024-07-30 PROCEDURE — 255N000002 HC RX 255 OP 636: Performed by: STUDENT IN AN ORGANIZED HEALTH CARE EDUCATION/TRAINING PROGRAM

## 2024-07-30 PROCEDURE — 74450 X-RAY URETHRA/BLADDER: CPT | Mod: XU

## 2024-07-30 RX ADMIN — IOHEXOL 50 ML: 300 INJECTION, SOLUTION INTRAVENOUS at 09:23

## 2024-08-23 ENCOUNTER — TRANSCRIBE ORDERS (OUTPATIENT)
Dept: OTHER | Age: 69
End: 2024-08-23

## 2024-08-23 DIAGNOSIS — N35.013 POST-TRAUMATIC ANTERIOR URETHRAL STRICTURE: Primary | ICD-10-CM

## 2024-09-26 NOTE — TELEPHONE ENCOUNTER
MEDICAL RECORDS REQUEST   [facility]  Urology Clinic  [address] [place] [address]  PHONE: [phone]  Fax: [phone]        FUTURE VISIT INFORMATION                                                   Pah CHELITA Robbins: 1955 scheduled for future visit at [facility]    APPOINTMENT INFORMATION:  Date: 10/7/2024  Provider:  Sid Jimenez MD  Reason for Visit/Diagnosis: Post-traumatic anterior urethral stricture [N35.013]    REFERRAL INFORMATION:  Referring provider:  James Sullivan MD   Specialty: UROLOGY  Referring providers clinic:  [facility] contact number:  Phone: [phone] Fax: [phone]     RECORDS REQUESTED FOR VISIT                                                     NOTES  STATUS/DETAILS   OFFICE NOTE from referring provider  yes - [facility]: 24, 24   DISCHARGE SUMMARY from hospital  yes - [facility]: 23   DISCHARGE REPORT from the ER  yes - [facility]: 23, 02/15/24   OPERATIVE REPORT  yes - [facility]: 23, 23   MEDICATION LIST  yes   LABS     URINALYSIS (UA)  yes - 24   URETHRAL STRICTURE     X RUG/ VCUG (IMAGES & REPORTS) PACS yes - 24   CT ABDOMEN/PELVIS (IMAGES & REPORT) PACS yes - 24, 23, 23     PRE-VISIT CHECKLIST      Joint diagnostic appointment coordinated correctly          (ensure right order & amount of time) Yes   RECORD COLLECTION COMPLETE Yes

## 2024-09-29 ENCOUNTER — PRE VISIT (OUTPATIENT)
Dept: UROLOGY | Facility: CLINIC | Age: 69
End: 2024-09-29
Payer: COMMERCIAL

## 2024-09-29 NOTE — TELEPHONE ENCOUNTER
Reason for visit: consult     Relevant information: urethral stricture, post traumatic anterior urethral stricture    Records/imaging/labs/orders: imaging available from 7/30/24    Pt called: No need for a call    At Rooming: flow/[id number]  9/29/2024  12:41 PM

## 2024-10-01 NOTE — PROGRESS NOTES
"HPI:  Pah Pwar is a 69 year old male with hx of urethral injury with proximal penile urethral stricture s/p SPT being seen for surgery consult.     accompanied by his daughter and mental health support person    [facility]Sur history-  11/28/2023-cystoscopy in the OR for poor Fair drainage, clot evacuation. subsequently followed by a secondary procedure in which the patient's urethra was noted to be obliterated and unable to gain retrograde access therefore he underwent open suprapubic tube placement.   6/23/2024-orchitis      Today-  - SPT for ~ 1 year  - no gross hematuria  - accident - a tree fell on [place] 10 years ago [place] in [facility]. Surgery was in [facility]. He was hospitalized for 1 month post op. Urethral fair post op for 10 days.     Other medical condition - HTN    Reviewed previous notes from Dr. Sullivan  from [facility], and from Dr. Munroe from ER service at [facility].     Exam:  /89   Pulse 103   Ht 1.575 m (5' 2\")   Wt 66.2 kg (146 lb)   SpO2 95%   BMI 26.70 kg/m    General: age-appropriate appearing male in NAD sitting in an exam chair with cane.  Abdomen: Degree of obesity is none. Abdomen is soft and nontender. No organomegaly. Surgical scars include Abdominal incision from full laparotomy. Large ventral hernia  : testicles normal without atrophy or masses and penis normal without urethral discharge    Review of Imaging:  The following imaging exams were independently viewed and interpreted by me and discussed with patient:  Rug/VCUG 7/30/2024  3.5 cm length irregular stricture of the proximal penile urethra / Penoscrotal junction with some striations or cobblestone appearance    Review of Labs:  The following labs were reviewed by me and discussed with the patient:  No results found for this or any previous visit (from the past 720 hour(s)).    Creatinine 0.99 on 6/25/2024  Assessment & Plan   Proximal penile urethral stricture. Cobblestone appearance does raise " suspicion for urethral cancer. Also the previous cysto by other urologist mentions a necrotic appearance. So will start with a cysto and consider biopsy if looks suspicious. In that case we would cancel the urethroplasty. But his urethra feels soft and this obstruction has been present for over a year; so it is unlikely it is cancer because urethral cancer is usually rapidly progressive; I think it is most likely that we will proceed.       Penile urethral stricture likely -- most likely etiology urethral fair in [place].    Given the length and location of the stricture, dilation would not be effective. Discussed urethroplasty with buccal graft.  He will have both SPT and urethral fair after surgery for a few weeks.     We discussed the risks, benefits and alternative of urethroplasty. He understands the risks to include but not be limited to bleeding, infection, penile or scrotal pain/numbness, change in erectile or ejaculatory function, need for additional procedures and recurrence of primary disease. He wishes to proceed.       =================  Celestina Toney, DEONNA  [facility]     I acted as a scribe for this note. All portions of the documented history and physical were personally performed by Sid Jimenez MD as the attending physician.     All portions of the documented history and physical were personally performed by me as the attending physician. I have reviewed and edited the scribe's note as appropriate to reflect my personal interactions with the patient.      Sid Jimenez MD  [facility] [place]    ==========================      Additional Coding Information:    Problems:  4 -- complex problem     Data Reviewed  Review of external notes as documented above     Level of risk:  5 -- major surgery with patient or procedure risks

## 2024-10-07 ENCOUNTER — PRE VISIT (OUTPATIENT)
Dept: UROLOGY | Facility: CLINIC | Age: 69
End: 2024-10-07

## 2024-10-07 ENCOUNTER — OFFICE VISIT (OUTPATIENT)
Dept: UROLOGY | Facility: CLINIC | Age: 69
End: 2024-10-07
Attending: STUDENT IN AN ORGANIZED HEALTH CARE EDUCATION/TRAINING PROGRAM
Payer: COMMERCIAL

## 2024-10-07 VITALS
DIASTOLIC BLOOD PRESSURE: 89 MMHG | WEIGHT: 146 LBS | BODY MASS INDEX: 26.87 KG/M2 | SYSTOLIC BLOOD PRESSURE: 135 MMHG | OXYGEN SATURATION: 95 % | HEIGHT: 62 IN | HEART RATE: 103 BPM

## 2024-10-07 DIAGNOSIS — N35.013 POST-TRAUMATIC ANTERIOR URETHRAL STRICTURE: Primary | ICD-10-CM

## 2024-10-07 PROCEDURE — 99204 OFFICE O/P NEW MOD 45 MIN: CPT | Performed by: UROLOGY

## 2024-10-07 RX ORDER — ACETAMINOPHEN 650 MG/1
650 SUPPOSITORY RECTAL ONCE
OUTPATIENT
Start: 2024-10-07

## 2024-10-07 RX ORDER — CEFAZOLIN SODIUM 2 G/50ML
2 SOLUTION INTRAVENOUS SEE ADMIN INSTRUCTIONS
OUTPATIENT
Start: 2024-10-07

## 2024-10-07 RX ORDER — ACETAMINOPHEN 325 MG/1
975 TABLET ORAL ONCE
OUTPATIENT
Start: 2024-10-07 | End: 2024-10-07

## 2024-10-07 RX ORDER — CEFAZOLIN SODIUM 2 G/50ML
2 SOLUTION INTRAVENOUS
OUTPATIENT
Start: 2024-10-07

## 2024-10-07 ASSESSMENT — PAIN SCALES - GENERAL: PAINLEVEL: NO PAIN (0)

## 2024-10-07 NOTE — PROGRESS NOTES
Pre Op Teaching Flowsheet       Pre and Post op Patient Education  Relevant Diagnosis:  Stricture   Surgical procedure:  Urethroplasty with buccal   Teaching Topic:  Pre and post op teaching  Person Involved in teaching: Yes    Motivation Level:  Asks Questions: Yes  Eager to Learn: Yes  Cooperative: Yes  Receptive (willing/able to accept information):  Yes    Patient demonstrates understanding of the following:  Date of surgery:  TBD  [facility]  History and Physical and any other testing necessary prior to surgery: Yes  Required time line for completion of History and Physical and any pre-op testing: Yes    Patient demonstrates understanding of the following:  Pre-op bowel prep:  N/A  Pre-op showering/scrub information with PCMX Soap: Yes  Blood thinner medications discussed and when to stop (if applicable):  Yes      Infection Prevention:   Patient demonstrates understanding of the following:  Surgical procedure site care taught: Yes  Signs and symptoms of infection taught: Yes      Post-op follow-up:  Discussed how to contact the hospital, nurse, and clinic scheduling staff if necessary. (See packet information)    Instructional materials used/given/mailed:  Cammal Surgery Packet, post op teaching sheet, Map, Soap, and with the arrival/location information to come closer to the surgery date.    Surgical instructions packet given to patient in office:  N/A    Follow up: Discussed arranging for someone to drive you home. ( No public transportation)  Someone needed to stay the first twenty hours after surgery: Yes     referral: No      home:  Nephew     Care Giver:  Nephew     PCP:  MD Henrietta Merida, RN

## 2024-10-07 NOTE — NURSING NOTE
"Chief Complaint   Patient presents with    Urethral Stricture       Blood pressure 135/89, pulse 103, height 1.575 m (5' 2\"), weight 66.2 kg (146 lb), SpO2 95%. Body mass index is 26.7 kg/m .    Patient Active Problem List   Diagnosis    Myofascial pain    GERD (gastroesophageal reflux disease)    Lumbar radiculopathy    Chronic abdominal pain    history of Neurocysticercosis    Urinary retention    Joseluis hematuria    MAUREEN (acute kidney injury) (H)    Severe sepsis (H)    Lactic acidosis    Acquired hypothyroidism    Gout    History of vitamin D deficiency    Hx of psychosis    Hyperlipidemia    Memory impairment    PTSD (post-traumatic stress disorder)    Recurrent falls    Recurrent major depression in full remission (H)    Anemia due to blood loss, acute    Orchitis and epididymitis    History of diabetes mellitus    Sepsis, due to unspecified organism, unspecified whether acute organ dysfunction present (H)       No Known Allergies    Current Outpatient Medications   Medication Sig Dispense Refill    acetaminophen (TYLENOL) 325 MG tablet Take 3 tablets (975 mg) by mouth 3 times daily as needed for mild pain      amLODIPine (NORVASC) 5 MG tablet Take 5 mg by mouth daily      ARIPiprazole (ABILIFY) 5 MG tablet Take 5 mg by mouth daily      diclofenac (VOLTAREN) 1 % topical gel Apply 2 g topically 4 times daily as needed for moderate pain (right knee)      ferrous gluconate (FERGON) 324 (38 Fe) MG tablet Take 1 tablet by mouth at bedtime      fish oil-omega-3 fatty acids 1000 MG capsule Take 2 g by mouth daily      FLUoxetine (PROZAC) 20 MG capsule Take 20 mg by mouth daily      levothyroxine (SYNTHROID, LEVOTHROID) 25 MCG tablet [LEVOTHYROXINE (SYNTHROID, LEVOTHROID) 25 MCG TABLET] Take 1 tablet by mouth daily.      multivitamin (ONE A DAY) per tablet Take 1 tablet by mouth daily      omeprazole (PRILOSEC) 20 MG capsule Take 20 mg by mouth daily      simvastatin (ZOCOR) 10 MG tablet Take 10 mg by mouth at bedtime   "    traZODone (DESYREL) 50 MG tablet Take  mg by mouth at bedtime      VITAMIN D3 2,000 unit capsule Take 4,000 Units by mouth daily  3       Social History     Tobacco Use    Smoking status: Former     Current packs/day: 0.00     Types: Cigarettes     Start date: 5/10/1969     Quit date: 5/10/2019     Years since quittin.4    Smokeless tobacco: Former     Types: Chew    Tobacco comments:     2 cigarettes/day   Substance Use Topics    Alcohol use: No     Comment: Alcoholic Drinks/day: occasional    Drug use: No       Lydia Beltran  10/7/2024  10:13 AM
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
## 2024-10-09 NOTE — PROGRESS NOTES
[facility]  Transplant Infectious Disease Clinic Note:  Follow Up     Patient:  Rere Robbins, Date of birth 1955, Medical record number [id number]  Date of Visit:  10/10/2024         Assessment and Recommendations:   Recommendations:  - Will obtain MRI Brain with and without contrast  - If MRI shows clinical stability, plan to have annual follow up, without additional treatment courses  - If MRI shows worsening, might require additional treatment. 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    #Neurocysticercosis:  Originally diagnosed in 2018 after he presented with 6-7 months of headaches and subsequent MRI showed multiple parenchymal cystic lesions with intracystic nodular foci. Has never had seizures. At the time, no significant cerebral edema. Received a 2 week course of Albendazole/Praziquantel (along with Prednisone) which was repeated 12/2019 after interval MRIs failed to show the degree of progress expected. After a 12/2021 MRI raised concern for area of cerebral edema, he was referred to ID at the . At the time, he was doing well clinically. A 12/2022 MRI showed slight increase in edema surrounding one lesion while other lesions had improved/resolved but he was lost to follow up    Returns today after being asked to re-establish care by his PCP. Has been doing well in the 2 years since he was last seen. Other than blurring of vision (which could be age related and for which he hasn't yet seen an eye doctor), he has no neurological symptoms.     Will obtain a repeat MRI but provided lesions are stable - will monitor clinically with annual visits      I spent 32 mins on date of encounter between chart review, in-person visit, documentation and care coordination    PAVEL Valiente  Staff  Physician, Infectious Diseases  Pager [phone]       Interval History:   Last seen in ID clinic 8/2022 but was lost to follow up  More recently, admitted at [facility] for L orchitis - negative blood culture but urine culture  with E.coli (R - Levaquin and Bactrim) and Pseudomonas. Treated with PO Levaquin and Cefdinir  Prior to that, admitted 12/2023 with severe sepsis (although blood/urine cultures remained negative). Zosyn -> Levaquin for treatment    He made an ID clinic appointment after his primary asked him to follow back up with ID    Mr. Robbins says he has been doing well over the last couple of years    No headache. Occasional neck tightness but no stiffness or pain. Has slight blurring of vision in both eyes but has not seen an Ophthalmologist yet. No sinus symptoms. No slurred speech. No loss of consciousness. No weakness of extremities. No loss of consciousness or loss of bowel/bladder function. No seizures. No gait imbalance. 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    Patient has been followed by Dr Britt at [facility] [place] [facility] since 2018. Was first seen in 2018 after being followed by Neurology for persistent headaches (for about 7 months mostly vertex/parietal, not associated with nausea vomiting). MRI Brain showed  multiple parenchymal cysts with intracystic nodular foci, most without surrounding edema although a couple are hyperintense on T2. No cerebral edema. No hydrocephalus. At least 10 lesions one in the left cerebellum. None intraventricular . Spinal MRI did not show cysticercosis. With history of chronic blurry vision dating back to about 7 years, he was seen by Ophthalmology [place] [facility] Dr. Broussard, where exam did not document infection or inflammation in either eye. There was no history of seizures epilepsy or fevers, and no history of eating  pork.    Other workup included negative HIV and negative QuantiFERON gold. Negative hepatitis B surface antigen negative hepatitis C antibody. Negative syphilis.    In 5/2018, patient was started on treatment for neurocysticercosis with Prednisone 50 mg daily, followed 24 hours later by anti-parasitic course which included Albendazole 400 mg twice daily for 14 days and Praziquantel 600 mg 3 times daily for 14 days    After interval MRIs in 12/2018 and 5/2019 showed improvement but no resolution, he was prescribed another course of anti-parasitic therapy in 5/2019 with Albendazole and Praziquantel (along with Prednisone). Unfortunately, he did not pick this up until 12/2019, but was then able to complete it He was most recently seen by his ID physician 3/2022 (after a 15-month gap) - at visit, patient had no new symptoms. However, MRI from 12/2021 showed increasing edematous change surrounding nodules in temporal lobe. He was referred to [facility] at first, but not accepted to be seen and  therefore referred to [facility]    Patient is seen in clinic today accompanied by his grand-daughter. He reports to be doing well - mentions his headaches have completely resolved. Denies neck pain or stiffness. Does report some blurring of vision, worse in the past few months. No slurred speech. No weakness/numbness of extremities. No seizures, loss of consciousness. No back pain. No gait imbalance    Review of Systems:  Remaining systems reviewed and negative    No significant PMHx other than cystocercosis      Patient Active Problem List   Diagnosis    Myofascial pain    GERD (gastroesophageal reflux disease)    Lumbar radiculopathy    Chronic abdominal pain    history of Neurocysticercosis    Urinary retention    Joseluis hematuria    MAUREEN (acute kidney injury) (H)    Severe sepsis (H)    Lactic acidosis    Acquired hypothyroidism    Gout    History of vitamin D deficiency    Hx of psychosis    Hyperlipidemia    Memory impairment     "PTSD (post-traumatic stress disorder)    Recurrent falls    Recurrent major depression in full remission (H)    Anemia due to blood loss, acute    Orchitis and epididymitis    History of diabetes mellitus    Sepsis, due to unspecified organism, unspecified whether acute organ dysfunction present (H)       Current Outpatient Medications   Medication Sig Dispense Refill    acetaminophen (TYLENOL) 325 MG tablet Take 3 tablets (975 mg) by mouth 3 times daily as needed for mild pain      amLODIPine (NORVASC) 5 MG tablet Take 5 mg by mouth daily      ARIPiprazole (ABILIFY) 5 MG tablet Take 5 mg by mouth daily      diclofenac (VOLTAREN) 1 % topical gel Apply 2 g topically 4 times daily as needed for moderate pain (right knee)      ferrous gluconate (FERGON) 324 (38 Fe) MG tablet Take 1 tablet by mouth at bedtime      fish oil-omega-3 fatty acids 1000 MG capsule Take 2 g by mouth daily      FLUoxetine (PROZAC) 20 MG capsule Take 20 mg by mouth daily      levothyroxine (SYNTHROID, LEVOTHROID) 25 MCG tablet [LEVOTHYROXINE (SYNTHROID, LEVOTHROID) 25 MCG TABLET] Take 1 tablet by mouth daily.      multivitamin (ONE A DAY) per tablet Take 1 tablet by mouth daily      omeprazole (PRILOSEC) 20 MG capsule Take 20 mg by mouth daily      simvastatin (ZOCOR) 10 MG tablet Take 10 mg by mouth at bedtime      traZODone (DESYREL) 50 MG tablet Take  mg by mouth at bedtime      VITAMIN D3 2,000 unit capsule Take 4,000 Units by mouth daily  3     No current facility-administered medications for this visit.       No Known Allergies           Physical Exam:   Vitals were reviewed.  All vitals stable  /85 (BP Location: Right arm, Cuff Size: Adult Regular)   Pulse 98   Ht 1.575 m (5' 2\")   Wt 65.4 kg (144 lb 4 oz)   SpO2 96%   BMI 26.38 kg/m    Wt Readings from Last 4 Encounters:   10/07/24 66.2 kg (146 lb)   06/22/24 66.3 kg (146 lb 2.6 oz)   02/15/24 61.2 kg (135 lb)   11/28/23 65.4 kg (144 lb 1.6 oz)       Exam:  GENERAL: " well-developed, well-nourished, alert, oriented, in no acute distress.  HEAD: Head is normocephalic, atraumatic   EYES: Eyes have anicteric sclerae.    ENT: Oropharynx exam unremarkable  NECK: Supple.  LUNGS: Clear to auscultation. On room air, no use of accessory muscles  CARDIOVASCULAR: Regular rate and rhythm with no murmurs, gallops or rubs.  ABDOMEN: Normal bowel sounds, soft, nontender.  SKIN: No acute rashes.   NEUROLOGIC: Grossly nonfocal, AAO x 3         Laboratory Data:       Metabolic Studies    Recent Labs   Lab Test 06/26/24  0727 06/25/24  1421 06/25/24  0719 06/24/24  1701 06/23/24  1514 06/23/24  0725 06/22/24  1511 01/10/24  1419   NA  --   --   --   --   --  139 137 141   POTASSIUM 3.7 4.1 3.4  --    < > 3.2* 3.4 3.6   CHLORIDE  --   --   --   --   --  106 100 105   CO2  --   --   --   --   --  22 24 25   ANIONGAP  --   --   --   --   --  11 13 11   BUN  --   --   --   --   --  8.6 9.3 11.6   CR  --   --  0.99  --   --  0.98 1.15 1.16   GFRESTIMATED  --   --  83  --   --  85 70 69   GLC  --   --   --   --   --  103* 137* 114*   KETAN  --   --   --   --   --  8.6* 9.6 9.2   URIC  --  6.6  --   --   --   --   --   --    LACT  --   --   --  0.7   < >  --  1.7  --     < > = values in this interval not displayed.       Hepatic Studies    Recent Labs   Lab Test 06/22/24  1511 11/27/23  1830 08/02/23  1416   BILITOTAL 1.3* 0.7 0.5   DBIL  --  <0.20  --    ALKPHOS 157* 100 100   PROTTOTAL 8.7* 8.1 7.8   ALBUMIN 3.9 4.6 4.7   AST 49* 41 37   ALT 44 48 41       Hematology Studies   Recent Labs   Lab Test 06/25/24  0719 06/24/24  0655 06/23/24  0725 06/22/24  1511 12/21/22  0925 09/14/21  1033   WBC 13.0* 12.2* 14.2* 16.7*   < > 7.1   ANEUTAUTO  --   --   --  12.5*  --  3.5   ALYMPAUTO  --   --   --  2.4  --  2.5   AMONOAUTO  --   --   --  1.4*  --  0.6   AEOSAUTO  --   --   --  0.3  --  0.3   ABSBASO  --   --   --  0.1  --  0.1   HGB  --   --  13.0* 15.6   < > 16.5   HCT  --   --  38.3* 45.1   < > 48.6   PLT  338  --  288 293   < > 316    < > = values in this interval not displayed.       Thyroid Studies     Recent Labs   Lab Test 24  1337 23  1830 23  1416 22  0925 21  1607 18  1408 18  0851   TSH 3.49 2.70 1.40 2.74 3.75   < > 6.57*   T4  --   --   --  1.28  --   --   --    T3  --   --   --   --   --   --  89    < > = values in this interval not displayed.       Microbiology:      Last Culture results   Culture   Date Value Ref Range Status   2024 No Growth  Final   2024 No Growth  Final   2024 No Growth  Final   2024 >100,000 CFU/mL Escherichia coli (A)  Final   2024 >100,000 CFU/mL Pseudomonas aeruginosa (A)  Final   2024 No Growth  Final   2023 No Growth  Final   2023 No Growth  Final   2023 No Growth  Final   2023 No Growth  Final   2023 No Growth  Final   03/15/2018 Usual Hue  Final         Quantiferon testing   Recent Labs   Lab Test 24  1511 21  1033   LYMPH 14 36       Virology:    Hepatitis B Testing     Recent Labs   Lab Test 18  1313   HEPBANG Negative      Hepatitis C Antibody   Date Value Ref Range Status   2018 Negative Negative Final       Imagin22 MRI Brain:  Right paramedian cerebella hemisphere neurocysticercosis lesion is not significantly changed in size, though demonstrates increased surrounding vasogenic edema with associated mild partial effacement of the 4th ventricle. No midline shift, herniation or hydrocephalus  Left posterolateral temporal neurocysticercosis lesion unchanged in size, though demonstrates resolution of associated vasogenic edema since previous exam  Several other previously seen punctate enhancing neurocysticercosis lesions throughout both cerebral hemispheres have either decreased in size and are below the resolution of present examination or are completely resolved  No new abnormal foci of intracranial enhancement  Stable mild  chronic small vessel ischemic disease and generalized brain parenchymal volume loss     12/28/2021 MRI Brain: 1. While the size and number of presumed neurocysticercosis lesions is  unchanged since 8/27/2020, there is increasing edematous change surrounding a cluster of nodules in  the left lateral temporal lobe    8/27/2020 MRI Brain (+ C-spine): Stable enhancing parenchymal nodules versus 12/7/2019 consistent  with sites of neurocysticercosis. No new lesion. No superimposed acute intracranial finding.    12/7/2019 MRI Brain: 1. Supra- and infratentorial lesions correlating with history of cysticercosis. 2.  Lesion burden has not significantly changed compared to 5/2/2019. 3. Lesions demonstrate expected  evolution since 3/8/2018. No new abnormality    5/2/2019 MRI Brain: 1. Continued evolution of previously demonstrated T2 hypointense lesions with  peripheral enhancement throughout the cerebral and cerebellar hemispheres. In the context of  neurocysticercosis sarcoidosis, these findings are consistent with neurocysticercosis predominantly in  the granular nodular stage. Edema within the previously described nodule within the posterior medial  temporal lobe on the left has decreased. 2. No evidence of acute intracranial hemorrhage, mass effect,  or infarction. 3. Mild nonspecific white matter changes. 4. Mild brain parenchymal volume loss.    12/12/2018 MRI Brain: 1. Interval involution of previously noted multiple cystic lesions within cerebral  convexities and posterior fossa. Development of internal T2 hypointense signal with peripheral  enhancement. In the context of neurocysticercosis, this is consistent with progression from vesicular  stage to granular nodular stage. Single nodule within left posteromedial temporal lobe demonstrating  mild parenchymal edema    3/8/2018 MRI Brain: Multiple parenchymal cysts which appear to predominantly involve gray-white  junction, several of which demonstrate mildly T1  hyperintense intra-cystic foci within the cyst. These  most likely represent various stages of neurocysticercosis or other parasitic infections. Most of these  lesions do not demonstrate surrounding edema, although there a couple with T2 FLAIR hyperintense  signal, which could represent granular nodular versus colloidal vesicular stage

## 2024-10-10 ENCOUNTER — OFFICE VISIT (OUTPATIENT)
Dept: INFECTIOUS DISEASES | Facility: CLINIC | Age: 69
End: 2024-10-10
Attending: STUDENT IN AN ORGANIZED HEALTH CARE EDUCATION/TRAINING PROGRAM
Payer: COMMERCIAL

## 2024-10-10 ENCOUNTER — TELEPHONE (OUTPATIENT)
Dept: UROLOGY | Facility: CLINIC | Age: 69
End: 2024-10-10
Payer: COMMERCIAL

## 2024-10-10 ENCOUNTER — TELEPHONE (OUTPATIENT)
Dept: INFECTIOUS DISEASES | Facility: CLINIC | Age: 69
End: 2024-10-10

## 2024-10-10 VITALS
OXYGEN SATURATION: 96 % | SYSTOLIC BLOOD PRESSURE: 135 MMHG | BODY MASS INDEX: 26.54 KG/M2 | HEIGHT: 62 IN | HEART RATE: 98 BPM | WEIGHT: 144.25 LBS | DIASTOLIC BLOOD PRESSURE: 85 MMHG

## 2024-10-10 DIAGNOSIS — B69.0 NEUROCYSTICERCOSIS: Primary | ICD-10-CM

## 2024-10-10 PROBLEM — N35.013: Status: ACTIVE | Noted: 2024-10-07

## 2024-10-10 PROCEDURE — G0463 HOSPITAL OUTPT CLINIC VISIT: HCPCS | Performed by: STUDENT IN AN ORGANIZED HEALTH CARE EDUCATION/TRAINING PROGRAM

## 2024-10-10 PROCEDURE — 99214 OFFICE O/P EST MOD 30 MIN: CPT | Performed by: STUDENT IN AN ORGANIZED HEALTH CARE EDUCATION/TRAINING PROGRAM

## 2024-10-10 ASSESSMENT — PAIN SCALES - GENERAL: PAINLEVEL: NO PAIN (0)

## 2024-10-10 NOTE — NURSING NOTE
"Chief Complaint   Patient presents with    RECHECK     Follow up      /85 (BP Location: Right arm, Cuff Size: Adult Regular)   Pulse 98   Ht 1.575 m (5' 2\")   Wt 65.4 kg (144 lb 4 oz)   SpO2 96%   BMI 26.38 kg/m    Coty Snyder CMA on 10/10/2024 at 10:34 AM    "

## 2024-10-10 NOTE — TELEPHONE ENCOUNTER
Called patient to schedule surgery with Dr. Jimenez    Spoke with: Paw - Patient's grandchild    Date of Surgery: 11/15/2024    Approximate arrival time given:  No    Location of surgery: [facility]     Pre-Op H&P: PCP - [facility]    Post-Op Appt Date: 12/2/2024 at 4:30pm with Dr. Wilfred Doan     Imaging needed:  Yes    Discussed with patient pre-op RN will call 2-3 days prior to surgery with arrival time and instructions:  Yes     Packet sent out: Yes 10/10/24  via Mail - Standard      Additional Comments:  n/a    All patients questions were answered and was instructed to review surgical packet and call back with any questions or concerns.       Klaudia Gordon on 10/10/2024 at 2:25 PM

## 2024-10-10 NOTE — LETTER
10/10/2024       RE: Rere Robbins  [address]  [facility] [address]     Dear Colleague,    Thank you for referring your patient, Rere Robbins, to the [facility] INFECTIOUS DISEASE CLINIC [place] at [facility]. Please see a copy of my visit note below.    [facility]  Transplant Infectious Disease Clinic Note:  Follow Up     Patient:  Rere Robbins, Date of birth 1955, Medical record number [id number]  Date of Visit:  10/10/2024         Assessment and Recommendations:   Recommendations:  - Will obtain MRI Brain with and without contrast  - If MRI shows clinical stability, plan to have annual follow up, without additional treatment courses  - If MRI shows worsening, might require additional treatment. 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    #Neurocysticercosis:  Originally diagnosed in 2018 after he presented with 6-7 months of headaches and subsequent MRI showed multiple parenchymal cystic lesions with intracystic nodular foci. Has never had seizures. At the time, no significant cerebral edema. Received a 2 week course of Albendazole/Praziquantel (along with Prednisone) which was repeated 12/2019 after interval MRIs failed to show the degree of progress expected. After a 12/2021 MRI raised concern for area of cerebral edema, he was referred to ID at the . At the time, he was doing well clinically. A 12/2022 MRI showed slight increase in edema surrounding one lesion while other lesions had improved/resolved but he was lost to follow up    Returns today after being asked to re-establish care by his PCP. Has been doing well in the 2 years since he was last seen. Other than blurring of vision (which could be age related and for which he hasn't yet seen an eye  doctor), he has no neurological symptoms.     Will obtain a repeat MRI but provided lesions are stable - will monitor clinically with annual visits      I spent 32 mins on date of encounter between chart review, in-person visit, documentation and care coordination    PAVEL Valiente  Staff Physician, Infectious Diseases  Pager [phone]       Interval History:   Last seen in ID clinic 8/2022 but was lost to follow up  More recently, admitted at [facility] for L orchitis - negative blood culture but urine culture  with E.coli (R - Levaquin and Bactrim) and Pseudomonas. Treated with PO Levaquin and Cefdinir  Prior to that, admitted 12/2023 with severe sepsis (although blood/urine cultures remained negative). Zosyn -> Levaquin for treatment    He made an ID clinic appointment after his primary asked him to follow back up with ID    Mr. Robbins says he has been doing well over the last couple of years    No headache. Occasional neck tightness but no stiffness or pain. Has slight blurring of vision in both eyes but has not seen an Ophthalmologist yet. No sinus symptoms. No slurred speech. No loss of consciousness. No weakness of extremities. No loss of consciousness or loss of bowel/bladder function. No seizures. No gait imbalance. 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    Patient has been followed by Dr Britt at [facility] since 2018. Was first seen in 2018 after being followed by Neurology for persistent headaches (for about 7 months mostly vertex/parietal, not associated with nausea vomiting). MRI Brain showed  multiple parenchymal cysts with intracystic nodular foci, most without surrounding edema although a couple are hyperintense on T2. No cerebral edema. No hydrocephalus. At least 10 lesions one in the left cerebellum. None intraventricular . Spinal MRI did not  show cysticercosis. With history of chronic blurry vision dating back to about 7 years, he was seen by Ophthalmology [facility] [facility] Dr. Broussard, where exam did not document infection or inflammation in either eye. There was no history of seizures epilepsy or fevers, and no history of eating pork.    Other workup included negative HIV and negative QuantiFERON gold. Negative hepatitis B surface antigen negative hepatitis C antibody. Negative syphilis.    In 5/2018, patient was started on treatment for neurocysticercosis with Prednisone 50 mg daily, followed 24 hours later by anti-parasitic course which included Albendazole 400 mg twice daily for 14 days and Praziquantel 600 mg 3 times daily for 14 days    After interval MRIs in 12/2018 and 5/2019 showed improvement but no resolution, he was prescribed another course of anti-parasitic therapy in 5/2019 with Albendazole and Praziquantel (along with Prednisone). Unfortunately, he did not pick this up until 12/2019, but was then able to complete it He was most recently seen by his ID physician 3/2022 (after a 15-month gap) - at visit, patient had no new symptoms. However, MRI from 12/2021 showed increasing edematous change surrounding nodules in temporal lobe. He was referred to [facility] at first, but not accepted to be seen and  therefore referred to [facility]    Patient is seen in clinic today accompanied by his grand-daughter. He reports to be doing well - mentions his headaches have completely resolved. Denies neck pain or stiffness. Does report some blurring of vision, worse in the past few months. No slurred speech. No weakness/numbness of extremities. No seizures, loss of consciousness. No back pain. No gait imbalance    Review of Systems:  Remaining systems reviewed and negative    No significant PMHx other than cystocercosis      Patient Active Problem List   Diagnosis     Myofascial pain     GERD (gastroesophageal reflux disease)     Lumbar radiculopathy      Chronic abdominal pain     history of Neurocysticercosis     Urinary retention     Joseluis hematuria     MAUREEN (acute kidney injury) (H)     Severe sepsis (H)     Lactic acidosis     Acquired hypothyroidism     Gout     History of vitamin D deficiency     Hx of psychosis     Hyperlipidemia     Memory impairment     PTSD (post-traumatic stress disorder)     Recurrent falls     Recurrent major depression in full remission (H)     Anemia due to blood loss, acute     Orchitis and epididymitis     History of diabetes mellitus     Sepsis, due to unspecified organism, unspecified whether acute organ dysfunction present (H)       Current Outpatient Medications   Medication Sig Dispense Refill     acetaminophen (TYLENOL) 325 MG tablet Take 3 tablets (975 mg) by mouth 3 times daily as needed for mild pain       amLODIPine (NORVASC) 5 MG tablet Take 5 mg by mouth daily       ARIPiprazole (ABILIFY) 5 MG tablet Take 5 mg by mouth daily       diclofenac (VOLTAREN) 1 % topical gel Apply 2 g topically 4 times daily as needed for moderate pain (right knee)       ferrous gluconate (FERGON) 324 (38 Fe) MG tablet Take 1 tablet by mouth at bedtime       fish oil-omega-3 fatty acids 1000 MG capsule Take 2 g by mouth daily       FLUoxetine (PROZAC) 20 MG capsule Take 20 mg by mouth daily       levothyroxine (SYNTHROID, LEVOTHROID) 25 MCG tablet [LEVOTHYROXINE (SYNTHROID, LEVOTHROID) 25 MCG TABLET] Take 1 tablet by mouth daily.       multivitamin (ONE A DAY) per tablet Take 1 tablet by mouth daily       omeprazole (PRILOSEC) 20 MG capsule Take 20 mg by mouth daily       simvastatin (ZOCOR) 10 MG tablet Take 10 mg by mouth at bedtime       traZODone (DESYREL) 50 MG tablet Take  mg by mouth at bedtime       VITAMIN D3 2,000 unit capsule Take 4,000 Units by mouth daily  3     No current facility-administered medications for this visit.       No Known Allergies           Physical Exam:   Vitals were reviewed.  All vitals stable  /85  "(BP Location: Right arm, Cuff Size: Adult Regular)   Pulse 98   Ht 1.575 m (5' 2\")   Wt 65.4 kg (144 lb 4 oz)   SpO2 96%   BMI 26.38 kg/m    Wt Readings from Last 4 Encounters:   10/07/24 66.2 kg (146 lb)   06/22/24 66.3 kg (146 lb 2.6 oz)   02/15/24 61.2 kg (135 lb)   11/28/23 65.4 kg (144 lb 1.6 oz)       Exam:  GENERAL: well-developed, well-nourished, alert, oriented, in no acute distress.  HEAD: Head is normocephalic, atraumatic   EYES: Eyes have anicteric sclerae.    ENT: Oropharynx exam unremarkable  NECK: Supple.  LUNGS: Clear to auscultation. On room air, no use of accessory muscles  CARDIOVASCULAR: Regular rate and rhythm with no murmurs, gallops or rubs.  ABDOMEN: Normal bowel sounds, soft, nontender.  SKIN: No acute rashes.   NEUROLOGIC: Grossly nonfocal, AAO x 3         Laboratory Data:       Metabolic Studies    Recent Labs   Lab Test 06/26/24  0727 06/25/24  1421 06/25/24  0719 06/24/24  1701 06/23/24  1514 06/23/24  0725 06/22/24  1511 01/10/24  1419   NA  --   --   --   --   --  139 137 141   POTASSIUM 3.7 4.1 3.4  --    < > 3.2* 3.4 3.6   CHLORIDE  --   --   --   --   --  106 100 105   CO2  --   --   --   --   --  22 24 25   ANIONGAP  --   --   --   --   --  11 13 11   BUN  --   --   --   --   --  8.6 9.3 11.6   CR  --   --  0.99  --   --  0.98 1.15 1.16   GFRESTIMATED  --   --  83  --   --  85 70 69   GLC  --   --   --   --   --  103* 137* 114*   KETAN  --   --   --   --   --  8.6* 9.6 9.2   URIC  --  6.6  --   --   --   --   --   --    LACT  --   --   --  0.7   < >  --  1.7  --     < > = values in this interval not displayed.       Hepatic Studies    Recent Labs   Lab Test 06/22/24  1511 11/27/23  1830 08/02/23  1416   BILITOTAL 1.3* 0.7 0.5   DBIL  --  <0.20  --    ALKPHOS 157* 100 100   PROTTOTAL 8.7* 8.1 7.8   ALBUMIN 3.9 4.6 4.7   AST 49* 41 37   ALT 44 48 41       Hematology Studies   Recent Labs   Lab Test 06/25/24  0719 06/24/24  0655 06/23/24  0725 06/22/24  1511 12/21/22  0925 " 21  1033   WBC 13.0* 12.2* 14.2* 16.7*   < > 7.1   ANEUTAUTO  --   --   --  12.5*  --  3.5   ALYMPAUTO  --   --   --  2.4  --  2.5   AMONOAUTO  --   --   --  1.4*  --  0.6   AEOSAUTO  --   --   --  0.3  --  0.3   ABSBASO  --   --   --  0.1  --  0.1   HGB  --   --  13.0* 15.6   < > 16.5   HCT  --   --  38.3* 45.1   < > 48.6     --  288 293   < > 316    < > = values in this interval not displayed.       Thyroid Studies     Recent Labs   Lab Test 24  1337 23  1830 23  1416 22  0925 21  1607 18  1408 18  0851   TSH 3.49 2.70 1.40 2.74 3.75   < > 6.57*   T4  --   --   --  1.28  --   --   --    T3  --   --   --   --   --   --  89    < > = values in this interval not displayed.       Microbiology:      Last Culture results   Culture   Date Value Ref Range Status   2024 No Growth  Final   2024 No Growth  Final   2024 No Growth  Final   2024 >100,000 CFU/mL Escherichia coli (A)  Final   2024 >100,000 CFU/mL Pseudomonas aeruginosa (A)  Final   2024 No Growth  Final   2023 No Growth  Final   2023 No Growth  Final   2023 No Growth  Final   2023 No Growth  Final   2023 No Growth  Final   03/15/2018 Usual Hue  Final         Quantiferon testing   Recent Labs   Lab Test 24  1511 21  1033   LYMPH 14 36       Virology:    Hepatitis B Testing     Recent Labs   Lab Test 18  1313   HEPBANG Negative      Hepatitis C Antibody   Date Value Ref Range Status   2018 Negative Negative Final       Imagin22 MRI Brain:  Right paramedian cerebella hemisphere neurocysticercosis lesion is not significantly changed in size, though demonstrates increased surrounding vasogenic edema with associated mild partial effacement of the 4th ventricle. No midline shift, herniation or hydrocephalus  Left posterolateral temporal neurocysticercosis lesion unchanged in size, though demonstrates resolution of  associated vasogenic edema since previous exam  Several other previously seen punctate enhancing neurocysticercosis lesions throughout both cerebral hemispheres have either decreased in size and are below the resolution of present examination or are completely resolved  No new abnormal foci of intracranial enhancement  Stable mild chronic small vessel ischemic disease and generalized brain parenchymal volume loss     12/28/2021 MRI Brain: 1. While the size and number of presumed neurocysticercosis lesions is  unchanged since 8/27/2020, there is increasing edematous change surrounding a cluster of nodules in  the left lateral temporal lobe    8/27/2020 MRI Brain (+ C-spine): Stable enhancing parenchymal nodules versus 12/7/2019 consistent  with sites of neurocysticercosis. No new lesion. No superimposed acute intracranial finding.    12/7/2019 MRI Brain: 1. Supra- and infratentorial lesions correlating with history of cysticercosis. 2.  Lesion burden has not significantly changed compared to 5/2/2019. 3. Lesions demonstrate expected  evolution since 3/8/2018. No new abnormality    5/2/2019 MRI Brain: 1. Continued evolution of previously demonstrated T2 hypointense lesions with  peripheral enhancement throughout the cerebral and cerebellar hemispheres. In the context of  neurocysticercosis sarcoidosis, these findings are consistent with neurocysticercosis predominantly in  the granular nodular stage. Edema within the previously described nodule within the posterior medial  temporal lobe on the left has decreased. 2. No evidence of acute intracranial hemorrhage, mass effect,  or infarction. 3. Mild nonspecific white matter changes. 4. Mild brain parenchymal volume loss.    12/12/2018 MRI Brain: 1. Interval involution of previously noted multiple cystic lesions within cerebral  convexities and posterior fossa. Development of internal T2 hypointense signal with peripheral  enhancement. In the context of  neurocysticercosis, this is consistent with progression from vesicular  stage to granular nodular stage. Single nodule within left posteromedial temporal lobe demonstrating  mild parenchymal edema    3/8/2018 MRI Brain: Multiple parenchymal cysts which appear to predominantly involve gray-white  junction, several of which demonstrate mildly T1 hyperintense intra-cystic foci within the cyst. These  most likely represent various stages of neurocysticercosis or other parasitic infections. Most of these  lesions do not demonstrate surrounding edema, although there a couple with T2 FLAIR hyperintense  signal, which could represent granular nodular versus colloidal vesicular stage          Again, thank you for allowing me to participate in the care of your patient.      Sincerely,    Lukas Dumont MD

## 2024-10-14 NOTE — TELEPHONE ENCOUNTER
[facility] Call Center    Phone Message    May a detailed message be left on voicemail: yes     Reason for Call: Other: PCP Dr. Belle Kincaid  had 2 MRI's this summer one in June the other in July via [facility], PCP is asking that those records be requested first. They can fax the read only results to Dr. Soto     Action Taken: Other: ID    Travel Screening: Not Applicable     Date of Service:

## 2024-10-15 ENCOUNTER — DOCUMENTATION ONLY (OUTPATIENT)
Dept: INFECTIOUS DISEASES | Facility: CLINIC | Age: 69
End: 2024-10-15
Payer: COMMERCIAL

## 2024-10-15 DIAGNOSIS — B69.0 NEUROCYSTICERCOSIS: Primary | ICD-10-CM

## 2024-10-15 NOTE — PROGRESS NOTES
Received a fax from PCP clinic with reports of a recent Brain MRI from 7/9/24:    IMPRESSION:   1. Redemonstration of multifocal supratentonal and intratentorial enhancing lesions, remains compatible with patient's provided clinical history of infectious/inflammatory process, specifically neurocysticercosis.   2. Todays contrast enhanced evaluation shows diminished edema associated with known lesion left frontal lobe superior frontal gyrus level compared to 6/19/2024. There is some relative increased edema about the known peripherally enhancing lesion in the parasagittal superior left occipital lobe compared to previous MRI 6/19/2024, No additional significant perilesional edema is evident of the remainder of the supratentorial and left infratentorial lesion with a total of at least 5 nodular and ring-enhancing lesions noted as listed above, with a duster of enhancing lesions peripherally left temporal lobe as before.   3. Overall number of lesions is comparable to lesion burden 12/28/2021 which was performed with contrast.   4. No recent infarction or restricted diffusion abnormality. No significant intracranial hemorrhage    Overall number of lesions has improved after treatment (previously 11 lesions notes in 2018-19)  Patient has remained asymptomatic for well over 3 years    At this time, will cancel previously placed order for brain MRI  Will have patient do a CT Head w/o contrast instead to evaluate for calcification in lesions which would indicate inactive lesions    If calcified, considering lack of symptoms, will not obtain further imaging  Will have patient check in annually for symptoms    PAVEL Valiente  Staff Physician, Infectious Diseases  Pager [phone]

## 2024-10-30 ENCOUNTER — LAB REQUISITION (OUTPATIENT)
Dept: LAB | Facility: CLINIC | Age: 69
End: 2024-10-30

## 2024-10-30 DIAGNOSIS — Z01.818 ENCOUNTER FOR OTHER PREPROCEDURAL EXAMINATION: ICD-10-CM

## 2024-10-30 PROCEDURE — 87086 URINE CULTURE/COLONY COUNT: CPT | Performed by: FAMILY MEDICINE

## 2024-10-30 PROCEDURE — 80048 BASIC METABOLIC PNL TOTAL CA: CPT | Performed by: FAMILY MEDICINE

## 2024-10-31 LAB
ANION GAP SERPL CALCULATED.3IONS-SCNC: 18 MMOL/L (ref 7–15)
BACTERIA UR CULT: NORMAL
BUN SERPL-MCNC: 13.5 MG/DL (ref 8–23)
CALCIUM SERPL-MCNC: 9.8 MG/DL (ref 8.8–10.4)
CHLORIDE SERPL-SCNC: 103 MMOL/L (ref 98–107)
CREAT SERPL-MCNC: 1.1 MG/DL (ref 0.67–1.17)
EGFRCR SERPLBLD CKD-EPI 2021: 74 ML/MIN/1.73M2
GLUCOSE SERPL-MCNC: 116 MG/DL (ref 70–99)
HCO3 SERPL-SCNC: 18 MMOL/L (ref 22–29)
POTASSIUM SERPL-SCNC: 3.6 MMOL/L (ref 3.4–5.3)
SODIUM SERPL-SCNC: 139 MMOL/L (ref 135–145)

## 2024-11-11 ENCOUNTER — VIRTUAL VISIT (OUTPATIENT)
Dept: INTERPRETER SERVICES | Facility: CLINIC | Age: 69
End: 2024-11-11
Payer: COMMERCIAL

## 2024-11-11 ENCOUNTER — TELEPHONE (OUTPATIENT)
Dept: UROLOGY | Facility: CLINIC | Age: 69
End: 2024-11-11
Payer: COMMERCIAL

## 2024-11-11 NOTE — TELEPHONE ENCOUNTER
Left message with assistance of Simona  to call to discuss getting pre-op physical done before his surgery unless he completed already.    Gladys Polanco, RN, BSN, PHN  Care Coordinator Urology  [facility], [place]  Urology [facility]  [phone]

## 2024-11-12 ENCOUNTER — TELEPHONE (OUTPATIENT)
Dept: UROLOGY | Facility: CLINIC | Age: 69
End: 2024-11-12
Payer: COMMERCIAL

## 2024-11-12 ENCOUNTER — APPOINTMENT (OUTPATIENT)
Dept: INTERPRETER SERVICES | Facility: CLINIC | Age: 69
End: 2024-11-12
Payer: COMMERCIAL

## 2024-11-12 DIAGNOSIS — N39.0 URINARY TRACT INFECTION: Primary | ICD-10-CM

## 2024-11-12 RX ORDER — CEFDINIR 300 MG/1
300 CAPSULE ORAL 2 TIMES DAILY
Qty: 10 CAPSULE | Refills: 0 | Status: SHIPPED | OUTPATIENT
Start: 2024-11-12 | End: 2024-11-17

## 2024-11-12 NOTE — TELEPHONE ENCOUNTER
----- Message from Wilfred Doan sent at 11/12/2024 11:00 AM CST -----  Regarding: Preop abx  Hi Gladys,    This patient is having surgery with us this Friday. Please send cefinidir 300 mg BID x 5 days to start Wednesday morning.    Thanks,  Wilfred

## 2024-11-12 NOTE — TELEPHONE ENCOUNTER
Left another message for the patient to call to discuss surgery and antibiotics    Gladys Polanco, RN, BSN, PHN  Care Coordinator Urology  [facility], [place]  Urology [facility]  [phone]'

## 2024-11-14 ENCOUNTER — ANESTHESIA EVENT (OUTPATIENT)
Dept: SURGERY | Facility: AMBULATORY SURGERY CENTER | Age: 69
End: 2024-11-14
Payer: COMMERCIAL

## 2024-11-15 ENCOUNTER — ANESTHESIA (OUTPATIENT)
Dept: SURGERY | Facility: AMBULATORY SURGERY CENTER | Age: 69
End: 2024-11-15
Payer: COMMERCIAL

## 2024-11-15 ENCOUNTER — PRE VISIT (OUTPATIENT)
Dept: UROLOGY | Facility: CLINIC | Age: 69
End: 2024-11-15
Payer: COMMERCIAL

## 2024-11-15 ENCOUNTER — HOSPITAL ENCOUNTER (OUTPATIENT)
Facility: AMBULATORY SURGERY CENTER | Age: 69
Discharge: HOME | End: 2024-11-15
Attending: UROLOGY
Payer: COMMERCIAL

## 2024-11-15 VITALS
RESPIRATION RATE: 18 BRPM | BODY MASS INDEX: 26.87 KG/M2 | SYSTOLIC BLOOD PRESSURE: 112 MMHG | WEIGHT: 146 LBS | HEIGHT: 62 IN | TEMPERATURE: 98 F | DIASTOLIC BLOOD PRESSURE: 84 MMHG | OXYGEN SATURATION: 95 % | HEART RATE: 102 BPM

## 2024-11-15 DIAGNOSIS — N35.013 POST-TRAUMATIC ANTERIOR URETHRAL STRICTURE: Primary | ICD-10-CM

## 2024-11-15 PROCEDURE — 88305 TISSUE EXAM BY PATHOLOGIST: CPT | Mod: TC | Performed by: UROLOGY

## 2024-11-15 PROCEDURE — 88305 TISSUE EXAM BY PATHOLOGIST: CPT | Mod: 26 | Performed by: PATHOLOGY

## 2024-11-15 RX ORDER — HYDROMORPHONE HYDROCHLORIDE 1 MG/ML
0.4 INJECTION, SOLUTION INTRAMUSCULAR; INTRAVENOUS; SUBCUTANEOUS EVERY 5 MIN PRN
Status: DISCONTINUED | OUTPATIENT
Start: 2024-11-15 | End: 2024-11-16 | Stop reason: HOSPADM

## 2024-11-15 RX ORDER — CIPROFLOXACIN 500 MG/1
500 TABLET, FILM COATED ORAL 2 TIMES DAILY
Qty: 1 TABLET | Refills: 0 | Status: SHIPPED | OUTPATIENT
Start: 2024-11-15 | End: 2024-11-15

## 2024-11-15 RX ORDER — FENTANYL CITRATE 50 UG/ML
INJECTION, SOLUTION INTRAMUSCULAR; INTRAVENOUS PRN
Status: DISCONTINUED | OUTPATIENT
Start: 2024-11-15 | End: 2024-11-15

## 2024-11-15 RX ORDER — CEFAZOLIN SODIUM 2 G/50ML
2 SOLUTION INTRAVENOUS
Status: DISCONTINUED | OUTPATIENT
Start: 2024-11-15 | End: 2024-11-16 | Stop reason: HOSPADM

## 2024-11-15 RX ORDER — FENTANYL CITRATE 50 UG/ML
50 INJECTION, SOLUTION INTRAMUSCULAR; INTRAVENOUS EVERY 5 MIN PRN
Status: DISCONTINUED | OUTPATIENT
Start: 2024-11-15 | End: 2024-11-16 | Stop reason: HOSPADM

## 2024-11-15 RX ORDER — CHLORHEXIDINE GLUCONATE ORAL RINSE 1.2 MG/ML
15 SOLUTION DENTAL 2 TIMES DAILY
Qty: 420 ML | Refills: 0 | Status: SHIPPED | OUTPATIENT
Start: 2024-11-15 | End: 2024-11-29

## 2024-11-15 RX ORDER — AMOXICILLIN 250 MG
1 CAPSULE ORAL DAILY
Qty: 14 TABLET | Refills: 0 | Status: SHIPPED | OUTPATIENT
Start: 2024-11-15

## 2024-11-15 RX ORDER — CHLORHEXIDINE GLUCONATE ORAL RINSE 1.2 MG/ML
15 SOLUTION DENTAL 2 TIMES DAILY
Qty: 473 ML | Refills: 0 | Status: SHIPPED | OUTPATIENT
Start: 2024-11-15 | End: 2024-11-15

## 2024-11-15 RX ORDER — ACETAMINOPHEN 325 MG/1
975 TABLET ORAL ONCE
Status: DISCONTINUED | OUTPATIENT
Start: 2024-11-15 | End: 2024-11-16 | Stop reason: HOSPADM

## 2024-11-15 RX ORDER — BUPIVACAINE HYDROCHLORIDE AND EPINEPHRINE 5; 5 MG/ML; UG/ML
INJECTION, SOLUTION PERINEURAL PRN
Status: DISCONTINUED | OUTPATIENT
Start: 2024-11-15 | End: 2024-11-15 | Stop reason: HOSPADM

## 2024-11-15 RX ORDER — PROPOFOL 10 MG/ML
INJECTION, EMULSION INTRAVENOUS PRN
Status: DISCONTINUED | OUTPATIENT
Start: 2024-11-15 | End: 2024-11-15

## 2024-11-15 RX ORDER — ONDANSETRON 4 MG/1
4 TABLET, ORALLY DISINTEGRATING ORAL EVERY 30 MIN PRN
Status: DISCONTINUED | OUTPATIENT
Start: 2024-11-15 | End: 2024-11-16 | Stop reason: HOSPADM

## 2024-11-15 RX ORDER — DEXAMETHASONE SODIUM PHOSPHATE 10 MG/ML
4 INJECTION, SOLUTION INTRAMUSCULAR; INTRAVENOUS
Status: DISCONTINUED | OUTPATIENT
Start: 2024-11-15 | End: 2024-11-16 | Stop reason: HOSPADM

## 2024-11-15 RX ORDER — FENTANYL CITRATE 50 UG/ML
25 INJECTION, SOLUTION INTRAMUSCULAR; INTRAVENOUS EVERY 5 MIN PRN
Status: DISCONTINUED | OUTPATIENT
Start: 2024-11-15 | End: 2024-11-16 | Stop reason: HOSPADM

## 2024-11-15 RX ORDER — ONDANSETRON 2 MG/ML
4 INJECTION INTRAMUSCULAR; INTRAVENOUS EVERY 30 MIN PRN
Status: DISCONTINUED | OUTPATIENT
Start: 2024-11-15 | End: 2024-11-16 | Stop reason: HOSPADM

## 2024-11-15 RX ORDER — NALOXONE HYDROCHLORIDE 0.4 MG/ML
0.1 INJECTION, SOLUTION INTRAMUSCULAR; INTRAVENOUS; SUBCUTANEOUS
Status: DISCONTINUED | OUTPATIENT
Start: 2024-11-15 | End: 2024-11-16 | Stop reason: HOSPADM

## 2024-11-15 RX ORDER — CHLORHEXIDINE GLUCONATE ORAL RINSE 1.2 MG/ML
15 SOLUTION DENTAL 2 TIMES DAILY
Qty: 473 ML | Refills: 0 | Status: SHIPPED | OUTPATIENT
Start: 2024-11-15

## 2024-11-15 RX ORDER — DEXAMETHASONE SODIUM PHOSPHATE 4 MG/ML
INJECTION, SOLUTION INTRA-ARTICULAR; INTRALESIONAL; INTRAMUSCULAR; INTRAVENOUS; SOFT TISSUE PRN
Status: DISCONTINUED | OUTPATIENT
Start: 2024-11-15 | End: 2024-11-15

## 2024-11-15 RX ORDER — OXYCODONE HYDROCHLORIDE 5 MG/1
5 TABLET ORAL EVERY 6 HOURS PRN
Qty: 10 TABLET | Refills: 0 | Status: SHIPPED | OUTPATIENT
Start: 2024-11-15 | End: 2024-11-18

## 2024-11-15 RX ORDER — CIPROFLOXACIN 500 MG/1
500 TABLET, FILM COATED ORAL 2 TIMES DAILY
Qty: 1 TABLET | Refills: 0 | Status: SHIPPED | OUTPATIENT
Start: 2024-11-15

## 2024-11-15 RX ORDER — ACETAMINOPHEN 650 MG/1
650 SUPPOSITORY RECTAL ONCE
Status: DISCONTINUED | OUTPATIENT
Start: 2024-11-15 | End: 2024-11-16 | Stop reason: HOSPADM

## 2024-11-15 RX ORDER — LIDOCAINE 40 MG/G
CREAM TOPICAL
Status: DISCONTINUED | OUTPATIENT
Start: 2024-11-15 | End: 2024-11-16 | Stop reason: HOSPADM

## 2024-11-15 RX ORDER — DIMENHYDRINATE 50 MG/ML
25 INJECTION, SOLUTION INTRAMUSCULAR; INTRAVENOUS
Status: DISCONTINUED | OUTPATIENT
Start: 2024-11-15 | End: 2024-11-16 | Stop reason: HOSPADM

## 2024-11-15 RX ORDER — CEFAZOLIN SODIUM 2 G/50ML
2 SOLUTION INTRAVENOUS SEE ADMIN INSTRUCTIONS
Status: DISCONTINUED | OUTPATIENT
Start: 2024-11-15 | End: 2024-11-16 | Stop reason: HOSPADM

## 2024-11-15 RX ORDER — OXYBUTYNIN CHLORIDE 5 MG/1
5 TABLET ORAL 3 TIMES DAILY
Qty: 42 TABLET | Refills: 0 | Status: SHIPPED | OUTPATIENT
Start: 2024-11-15

## 2024-11-15 RX ORDER — OXYCODONE HYDROCHLORIDE 5 MG/1
5 TABLET ORAL
Status: DISCONTINUED | OUTPATIENT
Start: 2024-11-15 | End: 2024-11-16 | Stop reason: HOSPADM

## 2024-11-15 RX ORDER — AMOXICILLIN 250 MG
1 CAPSULE ORAL DAILY
Qty: 14 TABLET | Refills: 0 | Status: SHIPPED | OUTPATIENT
Start: 2024-11-15 | End: 2024-11-15

## 2024-11-15 RX ORDER — HYDROMORPHONE HYDROCHLORIDE 1 MG/ML
0.2 INJECTION, SOLUTION INTRAMUSCULAR; INTRAVENOUS; SUBCUTANEOUS EVERY 5 MIN PRN
Status: DISCONTINUED | OUTPATIENT
Start: 2024-11-15 | End: 2024-11-16 | Stop reason: HOSPADM

## 2024-11-15 RX ORDER — KETOROLAC TROMETHAMINE 30 MG/ML
15 INJECTION, SOLUTION INTRAMUSCULAR; INTRAVENOUS
Status: DISCONTINUED | OUTPATIENT
Start: 2024-11-15 | End: 2024-11-16 | Stop reason: HOSPADM

## 2024-11-15 RX ORDER — OXYCODONE HYDROCHLORIDE 5 MG/1
10 TABLET ORAL
Status: DISCONTINUED | OUTPATIENT
Start: 2024-11-15 | End: 2024-11-16 | Stop reason: HOSPADM

## 2024-11-15 RX ORDER — PROPOFOL 10 MG/ML
INJECTION, EMULSION INTRAVENOUS CONTINUOUS PRN
Status: DISCONTINUED | OUTPATIENT
Start: 2024-11-15 | End: 2024-11-15

## 2024-11-15 RX ORDER — OXYBUTYNIN CHLORIDE 5 MG/1
5 TABLET ORAL 3 TIMES DAILY
Qty: 42 TABLET | Refills: 0 | Status: SHIPPED | OUTPATIENT
Start: 2024-11-15 | End: 2024-11-15

## 2024-11-15 RX ORDER — SODIUM CHLORIDE, SODIUM LACTATE, POTASSIUM CHLORIDE, CALCIUM CHLORIDE 600; 310; 30; 20 MG/100ML; MG/100ML; MG/100ML; MG/100ML
INJECTION, SOLUTION INTRAVENOUS CONTINUOUS
Status: DISCONTINUED | OUTPATIENT
Start: 2024-11-15 | End: 2024-11-16 | Stop reason: HOSPADM

## 2024-11-15 RX ORDER — LIDOCAINE HYDROCHLORIDE 20 MG/ML
INJECTION, SOLUTION INFILTRATION; PERINEURAL PRN
Status: DISCONTINUED | OUTPATIENT
Start: 2024-11-15 | End: 2024-11-15

## 2024-11-15 RX ADMIN — LIDOCAINE HYDROCHLORIDE 80 MG: 20 INJECTION, SOLUTION INFILTRATION; PERINEURAL at 13:54

## 2024-11-15 RX ADMIN — Medication 100 MCG: at 14:44

## 2024-11-15 RX ADMIN — DEXAMETHASONE SODIUM PHOSPHATE 4 MG: 4 INJECTION, SOLUTION INTRA-ARTICULAR; INTRALESIONAL; INTRAMUSCULAR; INTRAVENOUS; SOFT TISSUE at 14:00

## 2024-11-15 RX ADMIN — Medication 0.2 MCG/KG/MIN: at 14:42

## 2024-11-15 RX ADMIN — PROPOFOL 125 MCG/KG/MIN: 10 INJECTION, EMULSION INTRAVENOUS at 16:22

## 2024-11-15 RX ADMIN — PROPOFOL 125 MCG/KG/MIN: 10 INJECTION, EMULSION INTRAVENOUS at 15:26

## 2024-11-15 RX ADMIN — CEFAZOLIN SODIUM 2 G: 2 SOLUTION INTRAVENOUS at 13:57

## 2024-11-15 RX ADMIN — Medication 50 MG: at 13:54

## 2024-11-15 RX ADMIN — FENTANYL CITRATE 50 MCG: 50 INJECTION, SOLUTION INTRAMUSCULAR; INTRAVENOUS at 14:23

## 2024-11-15 RX ADMIN — FENTANYL CITRATE 50 MCG: 50 INJECTION, SOLUTION INTRAMUSCULAR; INTRAVENOUS at 14:01

## 2024-11-15 RX ADMIN — SODIUM CHLORIDE, SODIUM LACTATE, POTASSIUM CHLORIDE, CALCIUM CHLORIDE: 600; 310; 30; 20 INJECTION, SOLUTION INTRAVENOUS at 13:10

## 2024-11-15 RX ADMIN — Medication 8 MCG: at 14:00

## 2024-11-15 RX ADMIN — PROPOFOL 130 MG: 10 INJECTION, EMULSION INTRAVENOUS at 13:54

## 2024-11-15 RX ADMIN — Medication 0.5 MG: at 14:32

## 2024-11-15 RX ADMIN — Medication 100 MCG: at 14:31

## 2024-11-15 RX ADMIN — PROPOFOL 200 MCG/KG/MIN: 10 INJECTION, EMULSION INTRAVENOUS at 13:57

## 2024-11-15 RX ADMIN — Medication 100 MCG: at 14:14

## 2024-11-15 RX ADMIN — PROPOFOL 150 MCG/KG/MIN: 10 INJECTION, EMULSION INTRAVENOUS at 14:56
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
## 2024-11-15 NOTE — ANESTHESIA CARE TRANSFER NOTE
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    Anesthesia Type:   General     Note:    Oropharynx: oropharynx clear of all foreign objects and spontaneously breathing  Level of Consciousness: awake  Oxygen Supplementation: face mask  Level of Supplemental Oxygen (L/min / FiO2): 6  Independent Airway: airway patency satisfactory and stable  Dentition: dentition unchanged  Vital Signs Stable: post-procedure vital signs reviewed and stable  Report to RN Given: handoff report given  Patient transferred to: PACU  Comments: Report given to RN  Handoff Report: Identifed the Patient, Identified the Reponsible Provider, Reviewed the pertinent medical history, Discussed the surgical course, Reviewed Intra-OP anesthesia mangement and issues during anesthesia, Set expectations for post-procedure period and Allowed opportunity for questions and acknowledgement of understanding      Vitals:  Vitals Value Taken Time   BP     Temp     Pulse 90 11/15/24 1758   Resp 36 11/15/24 1758   SpO2 98 % 11/15/24 1758   Vitals shown include unfiled device data.    Electronically Signed By: MICHAELLE Silver CRNA  November 15, 2024  5:59 PM

## 2024-11-15 NOTE — ANESTHESIA PROCEDURE NOTES
Airway       Patient location during procedure: OR       Procedure Start/Stop Times: 11/15/2024 1:55 PM  Staff -        CRNA: Nasima Shore APRN CRNA       Performed By: CRNA  Consent for Airway        Urgency: elective  Indications and Patient Condition       Indications for airway management: elva-procedural       Induction type:intravenous       Mask difficulty assessment: 1 - vent by mask    Final Airway Details       Final airway type: endotracheal airway       Successful airway: ETT - single  Endotracheal Airway Details        ETT size (mm): 7.5       Cuffed: yes       Successful intubation technique: video laryngoscopy       VL Blade Size: MAC 3       Grade View of Cords: 1       Position: Right       Bite block used: Soft    Post intubation assessment        Placement verified by: capnometry, equal breath sounds and chest rise        Number of attempts at approach: 1       Number of other approaches attempted: 1       Secured with: commercial tube deleon and tape       Ease of procedure: easy       Dentition: Intact and Unchanged    Medication(s) Administered   Medication Administration Time: 11/15/2024 1:55 PM

## 2024-11-15 NOTE — BRIEF OP NOTE
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]  Post-operative diagnosis Same as pre-operative diagnosis    Procedure: URETHROPLASTY, USING BUCCAL MUCOSA GRAFT, N/A - Abdomen    Surgeon: Surgeons and Role:     * Sid Jimenez MD - Primary     * Ruperto Waddell MD - Resident - Assisting     * Wilfred Doan MD - Fellow - Assisting  Anesthesia: General   Estimated Blood Loss: 50 mL from 11/15/2024  1:50 PM to 11/15/2024  5:52 PM      Drains: 14Fr fair catheter  Specimens:   ID Type Source Tests Collected by Time Destination   1 : urethral stricture Tissue Urethra SURGICAL PATHOLOGY EXAM Sid Jimenez MD 11/15/2024  4:49 PM      Findings:   4cm stricture that was nearly obliterated in the penile urethra, repaired with a BMG .  Complications: None.  Implants: * No implants in log *    Plan:  - PACU then discharge home once meeting criteria   - peridex mouth wash  - catheter removal on 12/2/24 with [facility] prior   - 2 week post op check  - pack check as needed with gauze

## 2024-11-15 NOTE — DISCHARGE INSTRUCTIONS
"[facility]  Home Care Following Anesthesia  For 24 hours after surgery:  Get plenty of rest.  A responsible adult must stay with you for at least 24 hours after you leave the surgery center.  Do not drive or use heavy equipment.  If you have weakness or tingling, don't drive or use heavy equipment until this feeling goes away.   Do not drink alcohol.   Avoid strenuous or risky activities.  Ask for help when climbing stairs.  You may feel lightheaded.  IF so, sit for a few minutes before standing.  Have someone help you get up.   If you have nausea (feel sick to your stomach): Drink only clear liquids such as apple juice, ginger ale, broth or 7-Up.  Rest may also help.  Be sure to drink enough fluids.  Move to a regular diet as you feel able.   You may have a slight fever.  Call the doctor if your fever is over 100 F (37.7 C) (taken under the tongue) or lasts longer than 24 hours.  You may have a dry mouth, a sore throat, muscle aches or trouble sleeping. These should go away after 24 hours.  Do not make important or legal decisions.   It is recommended to avoid smoking.        Today you received a Marcaine or bupivacaine block to numb the nerves near your surgery site.  This is a block using local anesthetic or \"numbing\" medication injected around the nerves to anesthetize or \"numb\" the area supplied by those nerves.  This block is injected into the muscle layer near your surgical site.  The medication may numb the location where you had surgery for 6-18 hours, but may last up to 24 hours.  If your surgical site is an arm or leg you should be careful with your affected limb, since it is possible to injure your limb without being aware of it due to the numbing.  Until full feeling returns, you should guard against bumping or hitting your limb, and avoid extreme hot or cold temperatures on the skin.  As the block wears off, the feeling will return as a tingling or prickly " sensation near your surgical site.  You will experience more discomfort from your incision as the feeling returns.  You may want to take a pain pill (a narcotic or Tylenol if this was prescribed by your surgeon) when you start to experience mild pain before the pain beccomes more severe.  If your pain medications do not control your pain you should notifiy your surgeon.    Tips for taking pain medications  To get the best pain relief possible, remember these points:  Take pain medications as directed, before pain becomes severe.  Pain medication can upset your stomach: taking it with food may help.  Constipation is a common side effect of pain medication. Drink plenty of  fluids.  Eat foods high in fiber. Take a stool softener if recommended by your doctor or pharmacist.  Do not drink alcohol, drive or operate machinery while taking pain medications.  Ask about other ways to control pain, such as with heat, ice or relaxation.    Tylenol/Acetaminophen Consumption    If you feel your pain relief is insufficient, you may take Tylenol/Acetaminophen in addition to your narcotic pain medication.   Be careful not to exceed 4,000 mg of Tylenol/Acetaminophen in a 24 hour period from all sources.  If you are taking extra strength Tylenol/acetaminophen (500 mg), the maximum dose is 8 tablets in 24 hours.  If you are taking regular strength acetaminophen (325 mg), the maximum dose is 12 tablets in 24 hours.    Call a doctor for any of the following:  Signs of infection (fever, growing tenderness at the surgery site, a large amount of drainage or bleeding, severe pain, foul-smelling drainage, redness, swelling).  It has been over 8 to 10 hours since surgery and you are still not able to urinate (pass water).  Headache for over 24 hours.  Numbness, tingling or weakness the day after surgery (if you had spinal anesthesia).  Signs of Covid-19 infection (temperature over 100 degrees, shortness of breath, cough, loss of taste/smell,  generalized body aches, persistent headache, chills, sore throat, nausea/vomiting/diarrhea)  Your doctor is:  Dr. Sid Campbell, Prostate and Urology: [phone]  Or dial [phone] and ask for the resident on call for:  Prostate Urology  For emergency care, call the:  [facility]:  [phone] (TTY for hearing impaired: [phone])    Learning About How to Care for a Person's Indwelling Urinary Catheter  Introduction     A urinary catheter is a flexible plastic tube that's used to drain urine from the bladder when a person can't urinate. The catheter is placed into the bladder by inserting it through the urethra. The urethra is the opening that carries urine from the bladder to the outside of the body.  When the catheter is in the bladder, a small balloon is used to keep the catheter in place. The catheter lets urine drain from the bladder into a collection bag. Urinary catheters can be used in both men and women. A catheter that stays in place for a longer period of time is called an indwelling catheter.  A catheter may be needed because of certain medical conditions. These include an enlarged prostate or problems controlling urine. It may be used after surgery on the pelvis or urinary tract. Urinary catheters are also used when the lower part of the body is paralyzed.  When helping someone with a catheter, try to be relaxed. Caring for a catheter can be embarrassing for both of you. If you are calm and don't seem embarrassed, the person may feel more comfortable.  How do you take care of the catheter?  Wear disposable gloves when handling someone's catheter. Make sure to follow all of the instructions the doctor has given. And always wash your hands before and after you're done.  Here are some other things to remember when caring for someone's catheter:  Make sure that urine is running out of the catheter into the urine collection bag. And make sure that the catheter tubing does not get  twisted or bent.  Keep the urine collection bag below the level of the bladder. At night it may be helpful to hang the bag on the side of the bed.  Make sure that the urine collection bag does not drag and pull on the catheter.  It's okay to shower with a catheter and urine collection bag in place, unless the doctor says not to.  Check for swelling or signs of infection in the area around the catheter. Signs of infection include pus and irritated, swollen, red, or tender skin.  Clean the area around the catheter daily with soap and water. Dry with a clean towel afterward.  Do not apply powder or lotion to the skin around the catheter.  Do not tug or pull on the catheter.  Sexual intercourse may still be possible for people who wear a catheter. It's best to talk with a doctor about options.  How do you empty the bag?  The urine collection bag needs to be emptied regularly. It's best to empty the bag when it's about half full or at bedtime. If the doctor has asked you to measure the amount of urine, do that before you empty the urine into the toilet.  When you are ready to empty the bag, follow these steps:  Put on disposable gloves.  Remove the drain spout from its sleeve at the bottom of the collection bag. Open the valve on the spout.  Let the urine flow out of the bag and into the toilet or a container. Do not let the tubing or drain spout touch anything.  After you empty the bag, close the valve and put the drain spout back into its sleeve.  Remove your gloves, and throw them away.  Wash your hands with soap and water.  How do you care for someone after the catheter is removed?  After the catheter is taken out, the person may have trouble urinating. If this happens, try helping them sit in a few inches of warm water (sitz bath). If the urge to urinate comes during the sitz bath, it may be easier for them to urinate while still in the bath.  Some burning may happen the first few times the person urinates. If the  "burning lasts longer, it may be a sign of an infection.  Watch closely for changes in the person's health. Be sure to contact the doctor if you notice any problems or if the person is unable to urinate at all.  Where can you learn more?  Go to https://www.[employer].net/patiented  Enter X535 in the search box to learn more about \"Learning About How to Care for a Person's Indwelling Urinary Catheter.\"  Current as of: November 15, 2023  Content Version: 14.2 2024 [employer].   Care instructions adapted under license by your healthcare professional. If you have questions about a medical condition or this instruction, always ask your healthcare professional. [employer], [employer] disclaims any warranty or liability for your use of this information.                   Urethroplasty    Procedure:  Urethroplasty with buccal graft    Special instructions: If you notice continued bleeding from the cheek. If this is the case then place gauze in your mouth between your check and pinch it to apply pressure until it improves     Pain:  There will be discomfort in the area of the surgery.  If you have a graft done, usually the mouth hurts more than the groin. You will go home with Oxycodone which is a narcotic pain medication which should only be required for the first two to three days -- because the narcotics will cause sleepiness and constipation it is best to stop or reduce them at that time if you can.  After this, using Tylenol and or ibuprofen (Advil/Motrin) as directed on the packaging should be sufficient.  The pain should continue to get better on a daily basis.     You may notice bladder spasms which can present as sharp sudden pains in the area of bladder (just above the pubic hair) or the tip of the penis.  This is usually because the fair catheter irritates the bladder. You may feel like you have to urinate even though the bladder is fully drained. You will be given a prescription (see below) of a " medication to be taken as needed, which can help with these. It is important to take the bladder spasm medicine because occasionally these bladder spasms can get intense enough to cause you to urinate around (rather than through) the catheter and this rush of urine can damage the urethroplasty work.    If you had a buccal graft, you will notice some tightness and discomfort in one side of your mouth that may limit how much you can open your mouth.  This should improve over the next several weeks.    Urination:  You will have the fair catheter in your penis to drain your bladder until your follow up in clinic.  This should be secured to your abdomen at all times.  This will not generally restrict your activities, but you should be careful if you are driving such that it does not become a distraction.  You should apply the bacitracin antibiotic ointment (see below) to the tip of the penis twice a day.  You may notice a little blood, small amount of white discharge, or caking at the tip of the penis with the catheter in place.  This is normal but it can irritate the tip of the penis so it is best to wash this off in the shower.    Diet:  You may return to your normal diet that you were taking before surgery.  If you had a buccal graft used as part of your surgery, you may find soft food more comfortable, but there are no specific restrictions.    Activity:  You can return to your normal level of activity as you are able, based upon your level of pain. Walking and lifting won t hurt the urethroplasty site but use good judgment: if something hurts then don t do it. Most people take about 2 weeks off from work. Depending on whether you have an active job or a desk job you may be able to then go back to work while the catheter is still in. We leave this up to you based on your pain level. If you go back to work, don't expect to be at 100% until after the catheter comes out.  Restrictions:  You should not drive or drink  alcoholic beverages while you are taking narcotic medications (see below).  You should not soak in a tub or pool until the catheter is removed.  Daily showering is important.    Wound Care:  You will have an incision between your scrotum and anus (and one in the mouth if you had a graft done). These will be closed with stitches that will dissolve on their own and do not need to be removed.  You will not need any specific bandage on this area, but you may find wearing a small pad in your underwear can help protect from blood staining your underwear.  You can shower and let the water run over the incision.  You should not scrub it with soap.    Medications:  1. Oxycodone - this is a narcotic pain medication which you should only need for two to three days after surgery.    2. Oxybutinin - this is a bladder spasm medicine which you should take on a daily basis.  3. Bacitracin Ointment - this is an antibiotic ointment which will help with discomfort from the fair cathteter  4. Ciprofloxacin - this is an antibiotic pill to take the morning of fair catheter removal  5. Colace - this is a stool softener.  The surgery, pain medications, and anticholinergic medications can lead to constipation.  You can stop this or reduce it if you are having loose stools.  Other over the counter solutions such as prune juice, miralax, fiber products, senna, and dulcolax can also be used.  6. Peridex - (given only if you had a buccal graft); this mouth rinse is to use every two hours while you are awake, to help prevent infection and promote healing.    Follow-up:  Please call our triage nurse at [phone] (and choose option 3) the Monday after your surgery to update us on your progress and so we can answer any questions you have.     Your follow-up appointment with the urology clinic in 2 weeks and then 12/2 as scheduled .   You will have an X-ray and catheter removal done immediately before the clinic visit on 12/2    Phone numbers:  "  - Monday through Friday 8am to 4:30pm: Call [phone] with questions, requests for medication refills, or to schedule or confirm an appointment.  - Nights or weekends: call the after hours emergency pager - [phone] and tell the  \"I would like to page the Urology Resident on call.\" Please note, due to prescribing laws, resident physicians are unable to prescribe narcotics after-hours. If you feel as though you will need a refill of a narcotic pain medication, you will need to call the clinic during business hours OR seek emergency care.  - For emergencies, call [phone]    "

## 2024-11-15 NOTE — TELEPHONE ENCOUNTER
Reason for visit: 2-3 post op     Relevant information: **NEEDS AMIRA **, urethroplasty done on 11/15/24 via , hx of UTI    Records/imaging/labs/orders: All records available    At Rooming:  Standard rooming      Stephan Davonte  11/15/2024  12:13 PM

## 2024-11-16 NOTE — OP NOTE
OPERATIVE REPORT  November 15, 2024    PREOPERATIVE DIAGNOSIS: Penile urethral stricture  POSTOPERATIVE DIAGNOSIS: Penile urethral stricture (4 cm)    PROCEDURES:  Cystoscopy  Butler of buccal mucosa graft from left oral cavity (1.5 cm wide x 6 cm long) (14913)  Preparation of wound bed for grafting  Complex single stage anterior urethral reconstruction utilizing a dorsal onlay of buccal mucosa graft (93060-77)  Ventral mucosectomy of penile urethral stricture and ventral inlay of buccal mucosa graft    Modifier 22: more difficult than normal in that we had to do a ventral buccal graft and an excision of stricture and a dorsal buccal graft because his urethral lumen was completely obliterated    SURGEON: Sid Jimenez MD, MS  FELLOW: Wilfred Doan MD  RESIDENT: Ruperto Waddell MD     INDICATIONS: Mr. Rere Robbins is a 69 year old gentleman with a traumatic penile urethral stricture refractory to minimally-invasive management . The risks, benefits and alternatives of the multiple treatment options were described and the patient wished to proceed with urethroplasty. He understood the risks to include but not be limited to bleeding, infection, penile pain or numbness, scrotal pain or numbness, change in erectile or ejaculatory function, lower extremity neuropathy, deep venous thrombosis. He wished to proceed.     DESCRIPTION OF PROCEDURE:   After informed consent was obtained and preoperative antibiotics were given, the patient was taken to the operating room and placed supine on the operating table. General anesthesia was induced. He was intubated.      The patient was placed in the high lithotomy position. The perineum was shaved, prepped and draped in the usual sterile fashion. The mouth was prepped and draped in the usual sterile fashion.     We inserted a flexible cystoscope and evaluated the anterior urethra. We encountered a pinpoint stricture at the base of the penis. This was not concerning for carcinoma and no biopsy  was performed. We attempted to cannulate the urethra, but a Sensor wire could not be accommodated easily.    A holding stitch of 3-0 Silk was placed in the glans. A vertical midline incision was made at the penoscrotal junction and carried down through Dartos fascia. The erectile bodies and urethra were exposed.    The urethra was unilaterally mobilized off the corporal bodies. Distally, we inserted an 18F bougie a boule until we met resistance. This was roughly at the level of the penoscrotal junction. This guided the distal extent of our mobilization. The urethra was rotated to access the dorsal surface. Stay sutures of 4-0 Vicryl were placed.    A dorsal urethrotomy was made over the 18F bougie a boule at the penoscrotal junction. We placed holding sutures of 4-0 Vicryl on the urethral mucosa. The urethral lumen was patent but very narrowed. In order to guide the extension of our urethrotomy, we opted to place a cystoscope antegrade via the patient's SPT tract. We were able to identifying the proximal extent of the stricture. This was in the distal bulbar urethra. A wire was passed through the stricture and exited the tip of the penis. In this manner, we obtained through-and-through access.    Using a scalpel, we extended the urethrotomy proximally until we encountered normal  urethra. The total extent of the urethrotomy was 5 cm. The stricture was roughly 4 cm in length. It was not obliterative, but was very high-grade with dysplastic mucosa. The proximal end calibrated to 24F and the distal end to 20F.    Flexible cystoscopy was performed via the proximal urethrotomy. The bulbar urethra and posterior urethra were within normal limits. No stones, tumors, or other abnormalities were identified in the bladder.    A 1.5 cm x 7 cm buccal graft was harvested from the left oral cavity in the standard fashion. Three holding sutures of 2-0 silk were placed in the vermillion border of the lip. The Steinhauser buccal  mucosa retractor was put in place. The graft was marked out with calipers and hydrodissection was achieved with 0.5% Marcaine with 1:200,000 epinephrine. The graft was sharply harvested with a #15-blade scalpel taking care to avoid Phuong's duct and leave the buccinator muscle down with the patient. The graft was defatted and tapered on the back table using a silicone block.    The corporal bodies (wound bed) were prepared for grafting by ensuring they were free of overlying tissue and controlling all bleeding points with bipolar cautery.    The graft was brought into the field and was fixed in several places to the coporal bodies with mattress sutures of 4-0 Vicryl. 4-0 Vicryl was also used to fixed the lateral edges of the graft to the lateral edges of the corporal bodies. 5-0 PDS interrupted sutures were used to anastomose the proximal and distal apices of the graft to the proximal and distal apices of the urethra. The deep (right) edge of the graft was sewn to the lateral edge of the urethrotomy on the first side with a running 5-0 PDS. We did also excise a particularly dysplastic segment of mucosa in the penile urethra. This was roughly 1.5 cm in length and was excised as a mucosectomy and sent for pathology. The buccal graft was trimmed, and the excess graft was used to cover the ventral mucosa defect (I.e. ventral BMG inlay). This would result in a 1.5 cm segment of urethra which would have a double-faced buccal graft.     The urethra was then rotated back to anatomical position (0 degrees) The lateral edge of the second side of the graft was then sewn to the second side of the urethrotomy with another 5-0 PDS suture. Prior to completing the anastomosis, we placed a 14F latex catheter into the bladder. Dartos fascia was closed with a running 3-0 Vicryl suture. Skin was closed with interrupted 4-0 Monocryl sutures.     Xeroform, fluffs and scrotal support were placed. The patient was awakened from anesthesia,  "transferred to [facility] and then to the postanesthesia care unit in stable condition. There were no complications.     ESTIMATED BLOOD LOSS: 50 mL    5' 2.008\"  145 lbs 15.99 oz  Body mass index is 26.7 kg/m .    As the attending surgeon I, Sid Jimenez, was present and scrubbed throughout the procedure.      Wilfred Doan MD  Genitourinary Reconstructive Surgery Fellow  "

## 2024-11-16 NOTE — ANESTHESIA POSTPROCEDURE EVALUATION
Patient: Rere Pwar    Procedure: Procedure(s):  URETHROPLASTY, USING BUCCAL MUCOSA GRAFT       Anesthesia Type:  General    Note:  Disposition: Outpatient   Postop Pain Control: Uneventful            Sign Out: Well controlled pain   PONV: No   Neuro/Psych: Uneventful            Sign Out: Acceptable/Baseline neuro status   Airway/Respiratory: Uneventful            Sign Out: Acceptable/Baseline resp. status   CV/Hemodynamics: Uneventful            Sign Out: Acceptable CV status; No obvious hypovolemia; No obvious fluid overload   Other NRE: NONE   DID A NON-ROUTINE EVENT OCCUR? No           Last vitals:  Vitals Value Taken Time   /79 11/15/24 1815   Temp     Pulse 96 11/15/24 1829   Resp 20 11/15/24 1829   SpO2 98 % 11/15/24 1829   Vitals shown include unfiled device data.    Electronically Signed By: Oumar Trotter MD, MD  November 15, 2024  6:31 PM

## 2024-11-19 LAB
PATH REPORT.COMMENTS IMP SPEC: NORMAL
PATH REPORT.COMMENTS IMP SPEC: NORMAL
PATH REPORT.FINAL DX SPEC: NORMAL
PATH REPORT.GROSS SPEC: NORMAL
PATH REPORT.MICROSCOPIC SPEC OTHER STN: NORMAL
PATH REPORT.RELEVANT HX SPEC: NORMAL
PHOTO IMAGE: NORMAL

## 2024-12-02 ENCOUNTER — OFFICE VISIT (OUTPATIENT)
Dept: UROLOGY | Facility: CLINIC | Age: 69
End: 2024-12-02
Payer: COMMERCIAL

## 2024-12-02 ENCOUNTER — ANCILLARY PROCEDURE (OUTPATIENT)
Dept: GENERAL RADIOLOGY | Facility: CLINIC | Age: 69
End: 2024-12-02
Attending: UROLOGY
Payer: COMMERCIAL

## 2024-12-02 ENCOUNTER — VIRTUAL VISIT (OUTPATIENT)
Dept: INTERPRETER SERVICES | Facility: CLINIC | Age: 69
End: 2024-12-02

## 2024-12-02 VITALS
SYSTOLIC BLOOD PRESSURE: 150 MMHG | HEIGHT: 61 IN | BODY MASS INDEX: 26.43 KG/M2 | OXYGEN SATURATION: 100 % | WEIGHT: 140 LBS | DIASTOLIC BLOOD PRESSURE: 93 MMHG | HEART RATE: 88 BPM

## 2024-12-02 DIAGNOSIS — N35.013 POST-TRAUMATIC ANTERIOR URETHRAL STRICTURE: ICD-10-CM

## 2024-12-02 DIAGNOSIS — N35.013 POST-TRAUMATIC ANTERIOR URETHRAL STRICTURE: Primary | ICD-10-CM

## 2024-12-02 PROCEDURE — 74455 X-RAY URETHRA/BLADDER: CPT | Mod: GC | Performed by: RADIOLOGY

## 2024-12-02 PROCEDURE — 51600 INJECTION FOR BLADDER X-RAY: CPT | Mod: GC | Performed by: RADIOLOGY

## 2024-12-02 RX ORDER — IOPAMIDOL 510 MG/ML
150 INJECTION, SOLUTION INTRAVASCULAR ONCE
Status: COMPLETED | OUTPATIENT
Start: 2024-12-02 | End: 2024-12-02

## 2024-12-02 RX ADMIN — IOPAMIDOL 200 ML: 510 INJECTION, SOLUTION INTRAVASCULAR at 15:34

## 2024-12-02 ASSESSMENT — PAIN SCALES - GENERAL: PAINLEVEL_OUTOF10: NO PAIN (0)
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
## 2024-12-02 NOTE — NURSING NOTE
"Chief Complaint   Patient presents with    Consult     [id number]  number, Greh. Pt is here for a post op on urethroplasty       Blood pressure (!) 150/93, pulse 88, height 1.549 m (5' 1\"), weight 63.5 kg (140 lb), SpO2 100%. Body mass index is 26.45 kg/m .    Patient Active Problem List   Diagnosis    Myofascial pain    GERD (gastroesophageal reflux disease)    Lumbar radiculopathy    Chronic abdominal pain    history of Neurocysticercosis    Urinary retention    Joseluis hematuria    MAUREEN (acute kidney injury) (H)    Severe sepsis (H)    Lactic acidosis    Acquired hypothyroidism    Gout    History of vitamin D deficiency    Hx of psychosis    Hyperlipidemia    Memory impairment    PTSD (post-traumatic stress disorder)    Recurrent falls    Recurrent major depression in full remission (H)    Anemia due to blood loss, acute    Orchitis and epididymitis    History of diabetes mellitus    Sepsis, due to unspecified organism, unspecified whether acute organ dysfunction present (H)    Post-traumatic anterior urethral stricture       No Known Allergies    Current Outpatient Medications   Medication Sig Dispense Refill    acetaminophen (TYLENOL) 325 MG tablet Take 3 tablets (975 mg) by mouth 3 times daily as needed for mild pain      amLODIPine (NORVASC) 5 MG tablet Take 5 mg by mouth daily      ARIPiprazole (ABILIFY) 5 MG tablet Take 5 mg by mouth daily      chlorhexidine (PERIDEX) 0.12 % solution Swish and spit 15 mLs in mouth 2 times daily. 473 mL 0    ciprofloxacin (CIPRO) 500 MG tablet Take 1 tablet (500 mg) by mouth 2 times daily. 1 tablet 0    diclofenac (VOLTAREN) 1 % topical gel Apply 2 g topically 4 times daily as needed for moderate pain (right knee)      ferrous gluconate (FERGON) 324 (38 Fe) MG tablet Take 1 tablet by mouth at bedtime      fish oil-omega-3 fatty acids 1000 MG capsule Take 2 g by mouth daily      FLUoxetine (PROZAC) 20 MG capsule Take 20 mg by mouth daily      levothyroxine (SYNTHROID, " LEVOTHROID) 25 MCG tablet [LEVOTHYROXINE (SYNTHROID, LEVOTHROID) 25 MCG TABLET] Take 1 tablet by mouth daily.      multivitamin (ONE A DAY) per tablet Take 1 tablet by mouth daily      omeprazole (PRILOSEC) 20 MG capsule Take 20 mg by mouth daily      oxyBUTYnin (DITROPAN) 5 MG tablet Take 1 tablet (5 mg) by mouth 3 times daily. 42 tablet 0    senna-docusate (SENOKOT-S/PERICOLACE) 8.6-50 MG tablet Take 1 tablet by mouth daily. 14 tablet 0    simvastatin (ZOCOR) 10 MG tablet Take 10 mg by mouth at bedtime      traZODone (DESYREL) 50 MG tablet Take  mg by mouth at bedtime      VITAMIN D3 2,000 unit capsule Take 4,000 Units by mouth daily  3       Social History     Tobacco Use    Smoking status: Former     Current packs/day: 0.00     Types: Cigarettes     Start date: 5/10/1969     Quit date: 5/10/2019     Years since quittin.5    Smokeless tobacco: Former     Types: Chew    Tobacco comments:     2 cigarettes/day   Substance Use Topics    Alcohol use: No     Comment: Alcoholic Drinks/day: occasional    Drug use: Rebecca Arauz  2024  4:57 PM
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
## 2024-12-05 ENCOUNTER — APPOINTMENT (OUTPATIENT)
Dept: CT IMAGING | Facility: HOSPITAL | Age: 69
DRG: 697 | End: 2024-12-05
Payer: COMMERCIAL

## 2024-12-05 ENCOUNTER — HOSPITAL ENCOUNTER (INPATIENT)
Facility: HOSPITAL | Age: 69
DRG: 697 | End: 2024-12-05
Attending: EMERGENCY MEDICINE
Payer: COMMERCIAL

## 2024-12-05 VITALS
HEART RATE: 118 BPM | DIASTOLIC BLOOD PRESSURE: 87 MMHG | SYSTOLIC BLOOD PRESSURE: 148 MMHG | RESPIRATION RATE: 20 BRPM | OXYGEN SATURATION: 98 % | TEMPERATURE: 99.2 F

## 2024-12-05 DIAGNOSIS — N39.0 SEPSIS SECONDARY TO UTI (H): ICD-10-CM

## 2024-12-05 DIAGNOSIS — A41.9 SEPSIS SECONDARY TO UTI (H): ICD-10-CM

## 2024-12-05 DIAGNOSIS — N17.9 AKI (ACUTE KIDNEY INJURY) (H): ICD-10-CM

## 2024-12-05 DIAGNOSIS — N35.013 POST-TRAUMATIC ANTERIOR URETHRAL STRICTURE: ICD-10-CM

## 2024-12-05 DIAGNOSIS — N39.0 ACUTE UTI: ICD-10-CM

## 2024-12-05 DIAGNOSIS — R33.9 URINARY RETENTION: ICD-10-CM

## 2024-12-05 DIAGNOSIS — A41.9 SEPSIS, DUE TO UNSPECIFIED ORGANISM, UNSPECIFIED WHETHER ACUTE ORGAN DYSFUNCTION PRESENT (H): Primary | ICD-10-CM

## 2024-12-05 DIAGNOSIS — N39.0 COMPLICATED UTI (URINARY TRACT INFECTION): ICD-10-CM

## 2024-12-05 LAB
ALBUMIN SERPL BCG-MCNC: 4.2 G/DL (ref 3.5–5.2)
ALBUMIN UR-MCNC: 200 MG/DL
ALP SERPL-CCNC: 128 U/L (ref 40–150)
ALT SERPL W P-5'-P-CCNC: 43 U/L (ref 0–70)
ANION GAP SERPL CALCULATED.3IONS-SCNC: 16 MMOL/L (ref 7–15)
APPEARANCE UR: ABNORMAL
AST SERPL W P-5'-P-CCNC: ABNORMAL U/L
BACTERIA #/AREA URNS HPF: ABNORMAL /HPF
BASOPHILS # BLD AUTO: 0.1 10E3/UL (ref 0–0.2)
BASOPHILS NFR BLD AUTO: 0 %
BILIRUB SERPL-MCNC: 3.2 MG/DL
BILIRUB UR QL STRIP: NEGATIVE
BUN SERPL-MCNC: 21.6 MG/DL (ref 8–23)
CALCIUM SERPL-MCNC: 10 MG/DL (ref 8.8–10.4)
CHLORIDE SERPL-SCNC: 97 MMOL/L (ref 98–107)
COLOR UR AUTO: ABNORMAL
CREAT SERPL-MCNC: 2.4 MG/DL (ref 0.67–1.17)
EGFRCR SERPLBLD CKD-EPI 2021: 29 ML/MIN/1.73M2
EOSINOPHIL # BLD AUTO: 0.1 10E3/UL (ref 0–0.7)
EOSINOPHIL NFR BLD AUTO: 1 %
ERYTHROCYTE [DISTWIDTH] IN BLOOD BY AUTOMATED COUNT: 14.7 % (ref 10–15)
GLUCOSE SERPL-MCNC: 136 MG/DL (ref 70–99)
GLUCOSE UR STRIP-MCNC: NEGATIVE MG/DL
HCO3 SERPL-SCNC: 17 MMOL/L (ref 22–29)
HCT VFR BLD AUTO: 46.9 % (ref 40–53)
HGB BLD-MCNC: 16.3 G/DL (ref 13.3–17.7)
HGB UR QL STRIP: ABNORMAL
IMM GRANULOCYTES # BLD: 0.1 10E3/UL
IMM GRANULOCYTES NFR BLD: 0 %
KETONES UR STRIP-MCNC: NEGATIVE MG/DL
LACTATE SERPL-SCNC: 1.4 MMOL/L (ref 0.7–2)
LEUKOCYTE ESTERASE UR QL STRIP: ABNORMAL
LIPASE SERPL-CCNC: 18 U/L (ref 13–60)
LYMPHOCYTES # BLD AUTO: 1.4 10E3/UL (ref 0.8–5.3)
LYMPHOCYTES NFR BLD AUTO: 9 %
MCH RBC QN AUTO: 29.3 PG (ref 26.5–33)
MCHC RBC AUTO-ENTMCNC: 34.8 G/DL (ref 31.5–36.5)
MCV RBC AUTO: 84 FL (ref 78–100)
MONOCYTES # BLD AUTO: 1.7 10E3/UL (ref 0–1.3)
MONOCYTES NFR BLD AUTO: 11 %
NEUTROPHILS # BLD AUTO: 12.5 10E3/UL (ref 1.6–8.3)
NEUTROPHILS NFR BLD AUTO: 79 %
NITRATE UR QL: POSITIVE
NRBC # BLD AUTO: 0 10E3/UL
NRBC BLD AUTO-RTO: 0 /100
PH UR STRIP: 6 [PH] (ref 5–7)
PLAT MORPH BLD: ABNORMAL
PLATELET # BLD AUTO: 350 10E3/UL (ref 150–450)
POTASSIUM SERPL-SCNC: 4.7 MMOL/L (ref 3.4–5.3)
PROT SERPL-MCNC: 9.2 G/DL (ref 6.4–8.3)
RBC # BLD AUTO: 5.57 10E6/UL (ref 4.4–5.9)
RBC MORPH BLD: ABNORMAL
RBC URINE: 0 /HPF
SODIUM SERPL-SCNC: 130 MMOL/L (ref 135–145)
SP GR UR STRIP: 1.01 (ref 1–1.03)
TARGETS BLD QL SMEAR: SLIGHT
UROBILINOGEN UR STRIP-MCNC: <2 MG/DL
WBC # BLD AUTO: 15.7 10E3/UL (ref 4–11)
WBC CLUMPS #/AREA URNS HPF: PRESENT /HPF
WBC URINE: >182 /HPF

## 2024-12-05 PROCEDURE — 99285 EMERGENCY DEPT VISIT HI MDM: CPT | Mod: 25

## 2024-12-05 PROCEDURE — 99207 PR NO BILLABLE SERVICE THIS VISIT: CPT | Performed by: INTERNAL MEDICINE

## 2024-12-05 PROCEDURE — 52000 CYSTOURETHROSCOPY: CPT | Performed by: UROLOGY

## 2024-12-05 PROCEDURE — 96361 HYDRATE IV INFUSION ADD-ON: CPT

## 2024-12-05 PROCEDURE — 74176 CT ABD & PELVIS W/O CONTRAST: CPT

## 2024-12-05 PROCEDURE — 99222 1ST HOSP IP/OBS MODERATE 55: CPT | Mod: 25 | Performed by: UROLOGY

## 2024-12-05 PROCEDURE — 81001 URINALYSIS AUTO W/SCOPE: CPT

## 2024-12-05 PROCEDURE — 83735 ASSAY OF MAGNESIUM: CPT | Performed by: INTERNAL MEDICINE

## 2024-12-05 PROCEDURE — 83605 ASSAY OF LACTIC ACID: CPT

## 2024-12-05 PROCEDURE — 120N000001 HC R&B MED SURG/OB

## 2024-12-05 PROCEDURE — 36415 COLL VENOUS BLD VENIPUNCTURE: CPT

## 2024-12-05 PROCEDURE — 0T9B80Z DRAINAGE OF BLADDER WITH DRAINAGE DEVICE, VIA NATURAL OR ARTIFICIAL OPENING ENDOSCOPIC: ICD-10-PCS | Performed by: UROLOGY

## 2024-12-05 PROCEDURE — 36415 COLL VENOUS BLD VENIPUNCTURE: CPT | Performed by: EMERGENCY MEDICINE

## 2024-12-05 PROCEDURE — 87186 SC STD MICRODIL/AGAR DIL: CPT

## 2024-12-05 PROCEDURE — 84460 ALANINE AMINO (ALT) (SGPT): CPT | Performed by: EMERGENCY MEDICINE

## 2024-12-05 PROCEDURE — 85004 AUTOMATED DIFF WBC COUNT: CPT | Performed by: EMERGENCY MEDICINE

## 2024-12-05 PROCEDURE — 83690 ASSAY OF LIPASE: CPT | Performed by: STUDENT IN AN ORGANIZED HEALTH CARE EDUCATION/TRAINING PROGRAM

## 2024-12-05 PROCEDURE — 258N000003 HC RX IP 258 OP 636

## 2024-12-05 PROCEDURE — 84155 ASSAY OF PROTEIN SERUM: CPT | Performed by: EMERGENCY MEDICINE

## 2024-12-05 PROCEDURE — 250N000009 HC RX 250: Performed by: EMERGENCY MEDICINE

## 2024-12-05 PROCEDURE — 87088 URINE BACTERIA CULTURE: CPT

## 2024-12-05 PROCEDURE — 36415 COLL VENOUS BLD VENIPUNCTURE: CPT | Performed by: STUDENT IN AN ORGANIZED HEALTH CARE EDUCATION/TRAINING PROGRAM

## 2024-12-05 PROCEDURE — 96365 THER/PROPH/DIAG IV INF INIT: CPT

## 2024-12-05 PROCEDURE — 250N000011 HC RX IP 250 OP 636: Performed by: EMERGENCY MEDICINE

## 2024-12-05 PROCEDURE — 51798 US URINE CAPACITY MEASURE: CPT

## 2024-12-05 PROCEDURE — 99223 1ST HOSP IP/OBS HIGH 75: CPT | Performed by: INTERNAL MEDICINE

## 2024-12-05 RX ORDER — SODIUM CHLORIDE 9 MG/ML
INJECTION, SOLUTION INTRAVENOUS CONTINUOUS
Status: DISCONTINUED | OUTPATIENT
Start: 2024-12-06 | End: 2024-12-06

## 2024-12-05 RX ORDER — PIPERACILLIN SODIUM, TAZOBACTAM SODIUM 3; .375 G/15ML; G/15ML
3.38 INJECTION, POWDER, LYOPHILIZED, FOR SOLUTION INTRAVENOUS EVERY 8 HOURS
Status: DISCONTINUED | OUTPATIENT
Start: 2024-12-06 | End: 2024-12-06

## 2024-12-05 RX ORDER — LIDOCAINE HYDROCHLORIDE 20 MG/ML
10 JELLY TOPICAL ONCE
Status: COMPLETED | OUTPATIENT
Start: 2024-12-05 | End: 2024-12-05

## 2024-12-05 RX ORDER — HYDROMORPHONE HCL IN WATER/PF 6 MG/30 ML
0.5 PATIENT CONTROLLED ANALGESIA SYRINGE INTRAVENOUS ONCE
Status: COMPLETED | OUTPATIENT
Start: 2024-12-05 | End: 2024-12-05

## 2024-12-05 RX ORDER — PIPERACILLIN SODIUM, TAZOBACTAM SODIUM 3; .375 G/15ML; G/15ML
3.38 INJECTION, POWDER, LYOPHILIZED, FOR SOLUTION INTRAVENOUS ONCE
Status: COMPLETED | OUTPATIENT
Start: 2024-12-06 | End: 2024-12-06

## 2024-12-05 RX ORDER — CEFTRIAXONE 1 G/1
1 INJECTION, POWDER, FOR SOLUTION INTRAMUSCULAR; INTRAVENOUS ONCE
Status: COMPLETED | OUTPATIENT
Start: 2024-12-05 | End: 2024-12-05

## 2024-12-05 RX ADMIN — LIDOCAINE HYDROCHLORIDE 10 ML: 20 JELLY TOPICAL at 18:51

## 2024-12-05 RX ADMIN — CEFTRIAXONE SODIUM 1 G: 1 INJECTION, POWDER, FOR SOLUTION INTRAMUSCULAR; INTRAVENOUS at 18:58

## 2024-12-05 RX ADMIN — SODIUM CHLORIDE 1000 ML: 9 INJECTION, SOLUTION INTRAVENOUS at 21:35

## 2024-12-05 ASSESSMENT — COLUMBIA-SUICIDE SEVERITY RATING SCALE - C-SSRS
6. HAVE YOU EVER DONE ANYTHING, STARTED TO DO ANYTHING, OR PREPARED TO DO ANYTHING TO END YOUR LIFE?: NO
1. IN THE PAST MONTH, HAVE YOU WISHED YOU WERE DEAD OR WISHED YOU COULD GO TO SLEEP AND NOT WAKE UP?: NO
2. HAVE YOU ACTUALLY HAD ANY THOUGHTS OF KILLING YOURSELF IN THE PAST MONTH?: NO

## 2024-12-05 ASSESSMENT — ACTIVITIES OF DAILY LIVING (ADL)
ADLS_ACUITY_SCORE: 56

## 2024-12-05 NOTE — ED PROVIDER NOTES
"Emergency Department Midlevel Supervisory Note     I had a face to face encounter with this patient seen by the Advanced Practice Provider (SHANE). I personally made/approved the management plan and take responsibility for the patient management. I personally saw patient and performed a substantive portion of the visit including all aspects of the medical decision making.     ED Course:  4:48 PM Antonette Stanton PA-C staffed patient with me. I agree with their assessment and plan of management, and I will see the patient.  4:55 PM I met with the patient to introduce myself, gather additional history, perform my initial exam, and discuss the plan.  Bedside ultrasound performed.  Evidence of urinary retention noted.  Chase catheter to be placed.  Review of records indicate patient had urethroplasty for urethral stricture on 11/15/2024.    5:10 PM.  Patient with drainage from previous super catheter site.  Will attempt to place suprapubic catheter.  5:30 PM.  PA unable to place suprapubic catheter.  Reports opening is \"pinhole\".  Will proceed with Chase catheter.  6:10 PM Spoke with Dr. Verma from [facility].  He recommends Uro-Jet x 2 to help dilate the urethra followed by attempted placement of 14 or 16 Bel[place]ian coudé.  If unable to pass catheter page once again.  7:22 PM.  Sodium slightly reduced at 130.  Creatinine elevated 2.40.  Most recent value within normal from 10/30/2024 on review of chart.  White cell count slightly elevated at 15.7  .  UA with evidence of infection.  Rocephin ordered earlier.  Awaiting urology for placement of Chase catheter.  9:30 PM.  Chase catheter placed by urology.  Urine looks badly infected.  Patient with continued abdominal discomfort.  Will proceed with imaging.  Patient given intravenous Dilaudid for symptomatic relief    10:14 PM.  Lactic acid is normal.  Patient awaiting CT imaging.  Patient will likely require hospitalization given presentation.  Patient discussed with my associate " at end of shift.  Brief HPI:     Pah Pwar is a 69 year old male who presents for evaluation of abdominal pain.     I, Marianne Alvarez, am serving as a scribe to document services personally performed by Daniel Glover MD, based on my observations and the provider's statements to me.   I, Daniel Glover MD, attest that Marianne Alvarez was acting in a scribe capacity, has observed my performance of the services and has documented them in accordance with my direction.    Brief Physical Exam: BP (!) 131/91   Pulse 110   Temp 98.5  F (36.9  C) (Oral)   Resp 20   SpO2 99%   Constitutional:  Alert, in no acute distress  EYES: Conjunctivae clear  HENT:  Atraumatic  Respiratory:  Respirations even, unlabored, in no acute respiratory distress  Cardiovascular:  Regular rate and rhythm, good peripheral perfusion  GI: Soft, non-distended, non-tender  Musculoskeletal:  Moves all 4 extremities equally, grossly symmetrical strength  Integument: Warm & dry. No appreciable rash, erythema.  Neurologic:  Alert & oriented, speech clear and fluent, no focal deficits noted  Psych: Normal mood and affect       MDM:  Patient presenting with acute abdominal distention and discomfort.  Patient with recent urethral surgery to stricture and removal of Catheter a few days ago.  Patient reports she has been urinating normally since then.  Patient seen in triage area due to ED overcrowding.  Patient with marked distention.  Bedside ultrasound obtained.  This revealed marked bladder distention consistent with acute urinary retention.  Will obtain additional baseline blood work.  May require imaging.  Patient likely will require Chase catheter placement once again     Acute urinary retention  UTI  Abdominal pain    Consults:  Patient discussed with urology.  They will place Chase catheter.    Labs and Imaging:  Results for orders placed or performed during the hospital encounter of 12/05/24   Comprehensive metabolic panel     Status: Abnormal    Result Value Ref Range    Sodium 130 (L) 135 - 145 mmol/L    Potassium 4.7 3.4 - 5.3 mmol/L    Carbon Dioxide (CO2) 17 (L) 22 - 29 mmol/L    Anion Gap 16 (H) 7 - 15 mmol/L    Urea Nitrogen 21.6 8.0 - 23.0 mg/dL    Creatinine 2.40 (H) 0.67 - 1.17 mg/dL    GFR Estimate 29 (L) >60 mL/min/1.73m2    Calcium 10.0 8.8 - 10.4 mg/dL    Chloride 97 (L) 98 - 107 mmol/L    Glucose 136 (H) 70 - 99 mg/dL    Alkaline Phosphatase 128 40 - 150 U/L    AST      ALT 43 0 - 70 U/L    Protein Total 9.2 (H) 6.4 - 8.3 g/dL    Albumin 4.2 3.5 - 5.2 g/dL    Bilirubin Total 3.2 (H) <=1.2 mg/dL   CBC with platelets and differential     Status: Abnormal   Result Value Ref Range    WBC Count 15.7 (H) 4.0 - 11.0 10e3/uL    RBC Count 5.57 4.40 - 5.90 10e6/uL    Hemoglobin 16.3 13.3 - 17.7 g/dL    Hematocrit 46.9 40.0 - 53.0 %    MCV 84 78 - 100 fL    MCH 29.3 26.5 - 33.0 pg    MCHC 34.8 31.5 - 36.5 g/dL    RDW 14.7 10.0 - 15.0 %    Platelet Count 350 150 - 450 10e3/uL    % Neutrophils 79 %    % Lymphocytes 9 %    % Monocytes 11 %    % Eosinophils 1 %    % Basophils 0 %    % Immature Granulocytes 0 %    NRBCs per 100 WBC 0 <1 /100    Absolute Neutrophils 12.5 (H) 1.6 - 8.3 10e3/uL    Absolute Lymphocytes 1.4 0.8 - 5.3 10e3/uL    Absolute Monocytes 1.7 (H) 0.0 - 1.3 10e3/uL    Absolute Eosinophils 0.1 0.0 - 0.7 10e3/uL    Absolute Basophils 0.1 0.0 - 0.2 10e3/uL    Absolute Immature Granulocytes 0.1 <=0.4 10e3/uL    Absolute NRBCs 0.0 10e3/uL   UA with Microscopic reflex to Culture     Status: Abnormal    Specimen: Urine, Clean Catch   Result Value Ref Range    Color Urine Orange (A) Colorless, Straw, Light Yellow, Yellow    Appearance Urine Cloudy (A) Clear    Glucose Urine Negative Negative mg/dL    Bilirubin Urine Negative Negative    Ketones Urine Negative Negative mg/dL    Specific Gravity Urine 1.010 1.001 - 1.030    Blood Urine 1.0 mg/dL (A) Negative    pH Urine 6.0 5.0 - 7.0    Protein Albumin Urine 200 (A) Negative mg/dL    Urobilinogen  Urine <2.0 <2.0 mg/dL    Nitrite Urine Positive (A) Negative    Leukocyte Esterase Urine 500 Zeeshan/uL (A) Negative    Bacteria Urine Many (A) None Seen /HPF    WBC Clumps Urine Present (A) None Seen /HPF    RBC Urine 0 <=2 /HPF    WBC Urine >182 (H) <=5 /HPF    Narrative    Urine Culture ordered based on laboratory criteria   RBC and Platelet Morphology     Status: Abnormal   Result Value Ref Range    RBC Morphology Confirmed RBC Indices     Platelet Assessment  Automated Count Confirmed. Platelet morphology is normal.     Automated Count Confirmed. Platelet morphology is normal.    Target Cells Slight (A) None Seen   Lipase     Status: Normal   Result Value Ref Range    Lipase 18 13 - 60 U/L   Lactic Acid Whole Blood with 1X Repeat in 2 HR when >2     Status: Normal   Result Value Ref Range    Lactic Acid, Initial 1.4 0.7 - 2.0 mmol/L   CBC with Platelets & Differential     Status: Abnormal    Narrative    The following orders were created for panel order CBC with Platelets & Differential.  Procedure                               Abnormality         Status                     ---------                               -----------         ------                     CBC with platelets and d...[[id number]]  Abnormal            Final result               RBC and Platelet Morphology[[id number]]  Abnormal            Final result                 Please view results for these tests on the individual orders.        I have reviewed the relevant laboratory studies above.            Procedures:  I was present for the key portions of procedures documented in SHANE/midlevel note, see midlevel note for further details.    PROCEDURE: Emergency Department Limited Bedside Screening Ultrasound   ANATOMICAL WINDOW: Abdomen   INDICATIONS: Abdominal distention and discomfort   PROCEDURE PROVIDER: Dr. Daniel Glover   FINDINGS: {Marked bladder distention   IMAGES PRINTED & SCANNED OR SAVED TO MEMORY: ISABELLA Glover MD  [facility]  [facility] EMERGENCY DEPARTMENT  [address] [place] [id number]  [phone]     Daniel Glover MD  12/05/24 6928

## 2024-12-05 NOTE — ED NOTES
Pt called in triage for lab draw and IV start. Pt was not found, will attempt again in 10 minutes.
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
## 2024-12-06 LAB
ALBUMIN SERPL BCG-MCNC: 3.4 G/DL (ref 3.5–5.2)
ALP SERPL-CCNC: 109 U/L (ref 40–150)
ALT SERPL W P-5'-P-CCNC: 32 U/L (ref 0–70)
ANION GAP SERPL CALCULATED.3IONS-SCNC: 12 MMOL/L (ref 7–15)
AST SERPL W P-5'-P-CCNC: 25 U/L (ref 0–45)
BACTERIA UR CULT: ABNORMAL
BASOPHILS # BLD AUTO: 0.1 10E3/UL (ref 0–0.2)
BASOPHILS NFR BLD AUTO: 1 %
BILIRUB SERPL-MCNC: 2.1 MG/DL
BUN SERPL-MCNC: 14.1 MG/DL (ref 8–23)
CALCIUM SERPL-MCNC: 8.6 MG/DL (ref 8.8–10.4)
CHLORIDE SERPL-SCNC: 105 MMOL/L (ref 98–107)
CREAT SERPL-MCNC: 1.23 MG/DL (ref 0.67–1.17)
EGFRCR SERPLBLD CKD-EPI 2021: 64 ML/MIN/1.73M2
EOSINOPHIL # BLD AUTO: 0.2 10E3/UL (ref 0–0.7)
EOSINOPHIL NFR BLD AUTO: 2 %
ERYTHROCYTE [DISTWIDTH] IN BLOOD BY AUTOMATED COUNT: 14.7 % (ref 10–15)
GLUCOSE SERPL-MCNC: 112 MG/DL (ref 70–99)
HCO3 SERPL-SCNC: 20 MMOL/L (ref 22–29)
HCT VFR BLD AUTO: 40.9 % (ref 40–53)
HGB BLD-MCNC: 13.9 G/DL (ref 13.3–17.7)
IMM GRANULOCYTES # BLD: 0.1 10E3/UL
IMM GRANULOCYTES NFR BLD: 1 %
LYMPHOCYTES # BLD AUTO: 1.2 10E3/UL (ref 0.8–5.3)
LYMPHOCYTES NFR BLD AUTO: 10 %
MAGNESIUM SERPL-MCNC: 1.9 MG/DL (ref 1.7–2.3)
MAGNESIUM SERPL-MCNC: 2.3 MG/DL (ref 1.7–2.3)
MCH RBC QN AUTO: 28.7 PG (ref 26.5–33)
MCHC RBC AUTO-ENTMCNC: 34 G/DL (ref 31.5–36.5)
MCV RBC AUTO: 85 FL (ref 78–100)
MONOCYTES # BLD AUTO: 1.4 10E3/UL (ref 0–1.3)
MONOCYTES NFR BLD AUTO: 11 %
NEUTROPHILS # BLD AUTO: 9.6 10E3/UL (ref 1.6–8.3)
NEUTROPHILS NFR BLD AUTO: 77 %
NRBC # BLD AUTO: 0 10E3/UL
NRBC BLD AUTO-RTO: 0 /100
PHOSPHATE SERPL-MCNC: 2.3 MG/DL (ref 2.5–4.5)
PLATELET # BLD AUTO: 302 10E3/UL (ref 150–450)
POTASSIUM SERPL-SCNC: 3.3 MMOL/L (ref 3.4–5.3)
PROT SERPL-MCNC: 7 G/DL (ref 6.4–8.3)
RBC # BLD AUTO: 4.84 10E6/UL (ref 4.4–5.9)
SODIUM SERPL-SCNC: 137 MMOL/L (ref 135–145)
WBC # BLD AUTO: 12.6 10E3/UL (ref 4–11)

## 2024-12-06 PROCEDURE — 82435 ASSAY OF BLOOD CHLORIDE: CPT | Performed by: INTERNAL MEDICINE

## 2024-12-06 PROCEDURE — 85025 COMPLETE CBC W/AUTO DIFF WBC: CPT | Performed by: INTERNAL MEDICINE

## 2024-12-06 PROCEDURE — 250N000011 HC RX IP 250 OP 636: Performed by: INTERNAL MEDICINE

## 2024-12-06 PROCEDURE — 120N000001 HC R&B MED SURG/OB

## 2024-12-06 PROCEDURE — 36415 COLL VENOUS BLD VENIPUNCTURE: CPT | Performed by: INTERNAL MEDICINE

## 2024-12-06 PROCEDURE — 84460 ALANINE AMINO (ALT) (SGPT): CPT | Performed by: INTERNAL MEDICINE

## 2024-12-06 PROCEDURE — 84100 ASSAY OF PHOSPHORUS: CPT | Performed by: STUDENT IN AN ORGANIZED HEALTH CARE EDUCATION/TRAINING PROGRAM

## 2024-12-06 PROCEDURE — 84450 TRANSFERASE (AST) (SGOT): CPT | Performed by: INTERNAL MEDICINE

## 2024-12-06 PROCEDURE — 99232 SBSQ HOSP IP/OBS MODERATE 35: CPT | Performed by: STUDENT IN AN ORGANIZED HEALTH CARE EDUCATION/TRAINING PROGRAM

## 2024-12-06 PROCEDURE — 258N000003 HC RX IP 258 OP 636: Performed by: INTERNAL MEDICINE

## 2024-12-06 PROCEDURE — 258N000003 HC RX IP 258 OP 636: Performed by: STUDENT IN AN ORGANIZED HEALTH CARE EDUCATION/TRAINING PROGRAM

## 2024-12-06 PROCEDURE — 250N000011 HC RX IP 250 OP 636: Performed by: STUDENT IN AN ORGANIZED HEALTH CARE EDUCATION/TRAINING PROGRAM

## 2024-12-06 PROCEDURE — 250N000013 HC RX MED GY IP 250 OP 250 PS 637: Performed by: STUDENT IN AN ORGANIZED HEALTH CARE EDUCATION/TRAINING PROGRAM

## 2024-12-06 PROCEDURE — 83735 ASSAY OF MAGNESIUM: CPT | Performed by: STUDENT IN AN ORGANIZED HEALTH CARE EDUCATION/TRAINING PROGRAM

## 2024-12-06 RX ORDER — TRAZODONE HYDROCHLORIDE 50 MG/1
100 TABLET, FILM COATED ORAL AT BEDTIME
Status: DISCONTINUED | OUTPATIENT
Start: 2024-12-06 | End: 2024-12-08 | Stop reason: HOSPADM

## 2024-12-06 RX ORDER — HYDROMORPHONE HCL IN WATER/PF 6 MG/30 ML
0.4 PATIENT CONTROLLED ANALGESIA SYRINGE INTRAVENOUS
Status: DISCONTINUED | OUTPATIENT
Start: 2024-12-05 | End: 2024-12-08 | Stop reason: HOSPADM

## 2024-12-06 RX ORDER — NALOXONE HYDROCHLORIDE 0.4 MG/ML
0.2 INJECTION, SOLUTION INTRAMUSCULAR; INTRAVENOUS; SUBCUTANEOUS
Status: DISCONTINUED | OUTPATIENT
Start: 2024-12-06 | End: 2024-12-08 | Stop reason: HOSPADM

## 2024-12-06 RX ORDER — AMOXICILLIN 250 MG
1 CAPSULE ORAL 2 TIMES DAILY PRN
Status: DISCONTINUED | OUTPATIENT
Start: 2024-12-05 | End: 2024-12-08 | Stop reason: HOSPADM

## 2024-12-06 RX ORDER — NALOXONE HYDROCHLORIDE 0.4 MG/ML
0.4 INJECTION, SOLUTION INTRAMUSCULAR; INTRAVENOUS; SUBCUTANEOUS
Status: DISCONTINUED | OUTPATIENT
Start: 2024-12-06 | End: 2024-12-08 | Stop reason: HOSPADM

## 2024-12-06 RX ORDER — AMLODIPINE BESYLATE 5 MG/1
5 TABLET ORAL DAILY
Status: DISCONTINUED | OUTPATIENT
Start: 2024-12-06 | End: 2024-12-07

## 2024-12-06 RX ORDER — CEFEPIME HYDROCHLORIDE 2 G/1
2 INJECTION, POWDER, FOR SOLUTION INTRAVENOUS EVERY 12 HOURS
Status: DISCONTINUED | OUTPATIENT
Start: 2024-12-06 | End: 2024-12-07

## 2024-12-06 RX ORDER — POTASSIUM CHLORIDE 1500 MG/1
40 TABLET, EXTENDED RELEASE ORAL ONCE
Status: COMPLETED | OUTPATIENT
Start: 2024-12-06 | End: 2024-12-06

## 2024-12-06 RX ORDER — ARIPIPRAZOLE 5 MG/1
5 TABLET ORAL DAILY
Status: DISCONTINUED | OUTPATIENT
Start: 2024-12-06 | End: 2024-12-08 | Stop reason: HOSPADM

## 2024-12-06 RX ORDER — LEVOTHYROXINE SODIUM 25 UG/1
25 TABLET ORAL
Status: DISCONTINUED | OUTPATIENT
Start: 2024-12-06 | End: 2024-12-08 | Stop reason: HOSPADM

## 2024-12-06 RX ORDER — TAMSULOSIN HYDROCHLORIDE 0.4 MG/1
0.4 CAPSULE ORAL DAILY
Status: DISCONTINUED | OUTPATIENT
Start: 2024-12-06 | End: 2024-12-08 | Stop reason: HOSPADM

## 2024-12-06 RX ORDER — HYDROMORPHONE HCL IN WATER/PF 6 MG/30 ML
0.2 PATIENT CONTROLLED ANALGESIA SYRINGE INTRAVENOUS
Status: DISCONTINUED | OUTPATIENT
Start: 2024-12-05 | End: 2024-12-08 | Stop reason: HOSPADM

## 2024-12-06 RX ORDER — PANTOPRAZOLE SODIUM 40 MG/1
40 TABLET, DELAYED RELEASE ORAL
Status: DISCONTINUED | OUTPATIENT
Start: 2024-12-06 | End: 2024-12-08 | Stop reason: HOSPADM

## 2024-12-06 RX ORDER — ONDANSETRON 2 MG/ML
4 INJECTION INTRAMUSCULAR; INTRAVENOUS EVERY 6 HOURS PRN
Status: DISCONTINUED | OUTPATIENT
Start: 2024-12-05 | End: 2024-12-08 | Stop reason: HOSPADM

## 2024-12-06 RX ORDER — LIDOCAINE 40 MG/G
CREAM TOPICAL
Status: DISCONTINUED | OUTPATIENT
Start: 2024-12-05 | End: 2024-12-08 | Stop reason: HOSPADM

## 2024-12-06 RX ORDER — SENNOSIDES 8.6 MG
1300 CAPSULE ORAL EVERY 8 HOURS
COMMUNITY

## 2024-12-06 RX ORDER — AMOXICILLIN 250 MG
2 CAPSULE ORAL 2 TIMES DAILY PRN
Status: DISCONTINUED | OUTPATIENT
Start: 2024-12-05 | End: 2024-12-08 | Stop reason: HOSPADM

## 2024-12-06 RX ORDER — SIMVASTATIN 10 MG
10 TABLET ORAL AT BEDTIME
Status: DISCONTINUED | OUTPATIENT
Start: 2024-12-06 | End: 2024-12-08 | Stop reason: HOSPADM

## 2024-12-06 RX ORDER — ACETAMINOPHEN 325 MG/1
650 TABLET ORAL EVERY 4 HOURS PRN
Status: DISCONTINUED | OUTPATIENT
Start: 2024-12-06 | End: 2024-12-08 | Stop reason: HOSPADM

## 2024-12-06 RX ORDER — MULTIVITAMIN,THERAPEUTIC
1 TABLET ORAL
Status: DISCONTINUED | OUTPATIENT
Start: 2024-12-06 | End: 2024-12-08 | Stop reason: HOSPADM

## 2024-12-06 RX ORDER — ACETAMINOPHEN 650 MG/1
650 SUPPOSITORY RECTAL EVERY 4 HOURS PRN
Status: DISCONTINUED | OUTPATIENT
Start: 2024-12-06 | End: 2024-12-08 | Stop reason: HOSPADM

## 2024-12-06 RX ORDER — ONDANSETRON 4 MG/1
4 TABLET, ORALLY DISINTEGRATING ORAL EVERY 6 HOURS PRN
Status: DISCONTINUED | OUTPATIENT
Start: 2024-12-05 | End: 2024-12-08 | Stop reason: HOSPADM

## 2024-12-06 RX ORDER — FERROUS GLUCONATE 324(38)MG
324 TABLET ORAL AT BEDTIME
Status: DISCONTINUED | OUTPATIENT
Start: 2024-12-06 | End: 2024-12-08 | Stop reason: HOSPADM

## 2024-12-06 RX ORDER — CALCIUM CARBONATE 500 MG/1
1000 TABLET, CHEWABLE ORAL 4 TIMES DAILY PRN
Status: DISCONTINUED | OUTPATIENT
Start: 2024-12-05 | End: 2024-12-08 | Stop reason: HOSPADM

## 2024-12-06 RX ADMIN — PIPERACILLIN AND TAZOBACTAM 3.38 G: 3; .375 INJECTION, POWDER, FOR SOLUTION INTRAVENOUS at 15:33

## 2024-12-06 RX ADMIN — FLUOXETINE 20 MG: 20 CAPSULE ORAL at 10:51

## 2024-12-06 RX ADMIN — SODIUM CHLORIDE: 9 INJECTION, SOLUTION INTRAVENOUS at 01:08

## 2024-12-06 RX ADMIN — TRAZODONE HYDROCHLORIDE 100 MG: 50 TABLET ORAL at 21:37

## 2024-12-06 RX ADMIN — CEFEPIME HYDROCHLORIDE 2 G: 2 INJECTION, POWDER, FOR SOLUTION INTRAVENOUS at 19:37

## 2024-12-06 RX ADMIN — Medication 5000 UNITS: at 10:51

## 2024-12-06 RX ADMIN — LEVOTHYROXINE SODIUM 25 MCG: 0.03 TABLET ORAL at 10:51

## 2024-12-06 RX ADMIN — PIPERACILLIN AND TAZOBACTAM 3.38 G: 3; .375 INJECTION, POWDER, FOR SOLUTION INTRAVENOUS at 01:10

## 2024-12-06 RX ADMIN — PANTOPRAZOLE SODIUM 40 MG: 40 TABLET, DELAYED RELEASE ORAL at 10:51

## 2024-12-06 RX ADMIN — ACETAMINOPHEN 650 MG: 325 TABLET ORAL at 10:55

## 2024-12-06 RX ADMIN — FAMOTIDINE 20 MG: 10 INJECTION, SOLUTION INTRAVENOUS at 10:52

## 2024-12-06 RX ADMIN — ARIPIPRAZOLE 5 MG: 5 TABLET ORAL at 10:52

## 2024-12-06 RX ADMIN — PIPERACILLIN AND TAZOBACTAM 3.38 G: 3; .375 INJECTION, POWDER, FOR SOLUTION INTRAVENOUS at 06:19

## 2024-12-06 RX ADMIN — POTASSIUM CHLORIDE 40 MEQ: 1500 TABLET, EXTENDED RELEASE ORAL at 17:16

## 2024-12-06 RX ADMIN — HYDROMORPHONE HYDROCHLORIDE 0.2 MG: 0.2 INJECTION, SOLUTION INTRAMUSCULAR; INTRAVENOUS; SUBCUTANEOUS at 12:51

## 2024-12-06 RX ADMIN — TAMSULOSIN HYDROCHLORIDE 0.4 MG: 0.4 CAPSULE ORAL at 21:52

## 2024-12-06 RX ADMIN — SIMVASTATIN 10 MG: 10 TABLET, FILM COATED ORAL at 21:37

## 2024-12-06 RX ADMIN — SODIUM CHLORIDE: 9 INJECTION, SOLUTION INTRAVENOUS at 19:20

## 2024-12-06 RX ADMIN — FERROUS GLUCONATE 324 MG: 324 TABLET ORAL at 21:37

## 2024-12-06 RX ADMIN — SODIUM CHLORIDE: 9 INJECTION, SOLUTION INTRAVENOUS at 10:50

## 2024-12-06 ASSESSMENT — ACTIVITIES OF DAILY LIVING (ADL)
ADLS_ACUITY_SCORE: 61
ADLS_ACUITY_SCORE: 57
ADLS_ACUITY_SCORE: 41
ADLS_ACUITY_SCORE: 57
ADLS_ACUITY_SCORE: 56
ADLS_ACUITY_SCORE: 57
ADLS_ACUITY_SCORE: 56
ADLS_ACUITY_SCORE: 57
ADLS_ACUITY_SCORE: 41
ADLS_ACUITY_SCORE: 57
ADLS_ACUITY_SCORE: 57
ADLS_ACUITY_SCORE: 38
ADLS_ACUITY_SCORE: 56
ADLS_ACUITY_SCORE: 57
ADLS_ACUITY_SCORE: 41
ADLS_ACUITY_SCORE: 61
ADLS_ACUITY_SCORE: 38
ADLS_ACUITY_SCORE: 38
ADLS_ACUITY_SCORE: 57
ADLS_ACUITY_SCORE: 38
ADLS_ACUITY_SCORE: 57

## 2024-12-06 NOTE — PROGRESS NOTES
[facility]    Medicine Progress Note - Hospitalist Service    Date of Admission:  12/5/2024    Assessment & Plan   Rere Robbins is a 69 year old ranjit male with a medical history significant for hypothyroidism, gout, type 2 DM, GERD, depression, s/p supra catheter removal three days ago,  and other medical co morbidities who is admitted with sepsis in the setting of acute pyelonephritis, obstructive uropathy with acute renal failure.      Assessment and Plan    #Acute Urinary Retention  #Acute Renal Failure 2/2 Obstructive Uropathy  #Complicated Urinary Tract Infection  - History of urethral stricture s/p SPT (2023) and BMG urethroplasty (11/15/24)   - Patient had catheter removed about 4 days prior to ED presentation  - Had Fair catheter placed with cystoscopy in the ED with help from Urology.  - Baseline creatinine ~1.0. Creatinine elevated to 2.40 in ED.  - UA suggestive of UTI and UC growing Enterobacter.  Plan  - Urology following appreciate rect  - Change Zosyn to Cefepime       - Per pharmacy - should avoid Zosyn with this organism       - Urine Culture 06/2024 resistant to flouroquinolones  - Started flomax 0.4mg daily  - Plan to discharge with fair in place  - Follow daily creatinine  - Blood culture not drawn on admission - will hold off after multiple antibiotic doses.    Hypothyroidism  Plan:  Resume the patient's outpatient thyroid medication as per previous dosing.  Monitor thyroid function tests to ensure therapeutic levels are achieved.    Gastroesophageal Reflux Disease (GERD)  Etiology: Likely due to lower esophageal sphincter dysfunction.  Plan:  - Continue Pepcid (famotidine) as prescribed for symptom control.                Diet: Regular Diet Adult    DVT Prophylaxis: Pneumatic Compression Devices  Fair Catheter: PRESENT, indication: Acute retention or obstruction  Lines: None     Cardiac Monitoring: ACTIVE order. Indication: Tachyarrhythmias, acute (48 hours)  Code  "Status: Full Code      Clinically Significant Risk Factors Present on Admission        # Hypokalemia: Lowest K = 3.3 mmol/L in last 2 days, will replace as needed  # Hyponatremia: Lowest Na = 130 mmol/L in last 2 days, will monitor as appropriate  # Hypochloremia: Lowest Cl = 97 mmol/L in last 2 days, will monitor as appropriate  # Hypocalcemia: Lowest Ca = 8.6 mg/dL in last 2 days, will monitor and replace as appropriate     # Hypoalbuminemia: Lowest albumin = 3.4 g/dL at 12/6/2024  6:42 AM, will monitor as appropriate               # Overweight: Estimated body mass index is 25.48 kg/m  as calculated from the following:    Height as of this encounter: 1.575 m (5' 2\").    Weight as of this encounter: 63.2 kg (139 lb 5.3 oz).              Social Drivers of Health    Tobacco Use: Medium Risk (12/2/2024)    Patient History     Smoking Tobacco Use: Former     Smokeless Tobacco Use: Former          Disposition Plan     Medically Ready for Discharge: Anticipated in 2-4 Days             Toby Constantino MD  Hospitalist Service  [facility]  Securely message with [employer] (more info)  Text page via [employer] Paging/Directory   ______________________________________________________________________    Interval History   - Patient with Diarrhea which started in the ED today  - Abdominal pain improving with     Physical Exam   Vital Signs: Temp: 98  F (36.7  C) Temp src: Oral BP: (!) 140/72 Pulse: 87   Resp: 20 SpO2: 97 % O2 Device: None (Room air)    Weight: 139 lbs 5.29 oz    General: Alert and Oriented x3. Answers questions appropriately. Follows commands.  Eyes:EOMI. PERRL. Normal Conjunctivae.  HENT: Normocephalic. Atraumatic.  Resp: Breath sounds equal throughout. No crackles. No wheezing.  Cardio: Regular rate and rhythm. No murmurs. No friction rub.  Abd: Soft. Non-distended. Non-tender. Bowel sounds normal. No rebound tenderness.  MSK: No areas of ecchymosis or erythema.  Neuro: CN 2-12 grossly " intact. Strength 5/5 in all 4 extremities.  Skin:No rashes. No jaundice.       Medical Decision Making       45 MINUTES SPENT BY ME on the date of service doing chart review, history, exam, documentation & further activities per the note.  MANAGEMENT DISCUSSED with the following over the past 24 hours: RN   Tests ORDERED & REVIEWED in the past 24 hours:  - UA  Medical complexity over the past 24 hours:  - Prescription DRUG MANAGEMENT performed  - Treatment limited by SOCIAL DETERMINANTS OF HEALTH      Data     I have personally reviewed the following data over the past 24 hrs:    12.6 (H)  \   13.9   / 302     137 105 14.1 /  112 (H)   3.3 (L) 20 (L) 1.23 (H) \     ALT: 32 AST: 25 AP: 109 TBILI: 2.1 (H)   ALB: 3.4 (L) TOT PROTEIN: 7.0 LIPASE: 18     Procal: N/A CRP: N/A Lactic Acid: 1.4         Imaging results reviewed over the past 24 hrs:   Recent Results (from the past 24 hours)   CT Abdomen Pelvis w/o Contrast    Narrative    EXAM: CT ABDOMEN PELVIS W/O CONTRAST  LOCATION: [facility]  DATE: 12/5/2024    INDICATION: elevated Cr. Abdominal pain  COMPARISON: CT abdomen pelvis 6/22/2024.  TECHNIQUE: CT scan of the abdomen and pelvis was performed without IV contrast. Multiplanar reformats were obtained. Dose reduction techniques were used.  CONTRAST: None.    FINDINGS:   LOWER CHEST: Mild bibasilar atelectasis. Multivessel coronary artery calcification.    HEPATOBILIARY: Hepatic steatosis. Normal gallbladder and bile ducts.    PANCREAS: Normal.    SPLEEN: Normal.    ADRENAL GLANDS: Normal.    KIDNEYS/BLADDER: Chase catheter decompresses the urinary bladder. Mild bilateral perinephric inflammatory stranding. No significant hydronephrosis. Small benign cyst of the left kidney not warranting follow-up.    BOWEL: No obstruction or inflammation. Normal appendix.    LYMPH NODES: Normal.    VASCULATURE: Mild aortoiliac atherosclerosis.    PELVIC ORGANS: Moderate enlargement of the prostate  gland.    MUSCULOSKELETAL: Chronic deformity of the pelvic bones. Degenerative changes of the spine.      Impression    IMPRESSION:   1.  Urinary bladder decompressed by a Chase catheter. Mild bilateral perinephric inflammatory stranding but no significant hydronephrosis. Correlation with urinalysis suggested.  2.  Moderate enlargement of the prostate gland.  3.  Hepatic steatosis.  4.  Coronary artery disease.
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
## 2024-12-06 NOTE — CONSULTS
Urology consult note    Urology consulted in this 69-year-old male who is about 2 months status post urethroplasty.  He had an indwelling suprapubic tube which was removed by his primary urologist 2 days ago.  He has been having a hard time urinating over the past 2 days and also has noted abdominal distention.  His creatinine is elevated and although he does note that he is dribbling some his bladder is markedly distended on ultrasound at the bedside.  Attempts by nursing to place a Chase catheter were unsuccessful.  Urology was called to assess the situation and facilitate bladder drainage.    On my examination his abdomen was quite distended.  It was tympanitic.  Moderately tender to palpation.  Hold suprapubic tube site healing without any evidence of drainage.  Penis had evidence of blood at the meatus consistent with prior attempts at catheter placement which were unsuccessful.    Flexible cystoscopy was utilized and the 16 [place]oc[place] flexible cystoscope was inserted through the urethral meatus.  The urethra had a moderate to severe amount of edema however we were able to bypassed this by deflecting the scope anteriorly to the level of the prostatic urethra.  He had a limited assessment of the prostate but it did not appear to be obstructing.  We are able to follow the course of the prostatic urethra into the bladder.  Once within the bladder we passed a Bentson wire.  We used this to place a 16 [place] tip Chase catheter.  We filled the catheter balloon with 10 cc of sterile water.  There was about 600 cc of murky brown urine returned.      At this point in time recommend overnight observation on the hospitalist service.  Patient has evidence of an MAUREEN I suspect this is related to urinary retention.  Would recommend monitoring his creatinine.  He will need to follow-up with Dr. Sid Jimenez as an outpatient.  Would not recommend voiding trial until he is seen as an outpatient in urology clinic.  Would  recommend broad-spectrum coverage for presumed UTI with urine culture and plan to follow-up cultures.    Please call urology with any further questions or concerns.

## 2024-12-06 NOTE — PLAN OF CARE
Problem: Adult Inpatient Plan of Care  Goal: Absence of Hospital-Acquired Illness or Injury  Outcome: Progressing  Intervention: Identify and Manage Fall Risk  Recent Flowsheet Documentation  Taken 12/6/2024 0920 by Laxmi Estrada RN  Safety Promotion/Fall Prevention:   activity supervised   room organization consistent   safety round/check completed   clutter free environment maintained   lighting adjusted   nonskid shoes/slippers when out of bed   patient and family education   room near nurse's station  Intervention: Prevent Skin Injury  Recent Flowsheet Documentation  Taken 12/6/2024 1230 by Laxmi Estrada RN  Body Position: position changed independently  Taken 12/6/2024 0920 by Laxmi Estrada RN  Body Position: position changed independently  Goal: Optimal Comfort and Wellbeing  Outcome: Progressing  Intervention: Monitor Pain and Promote Comfort  Recent Flowsheet Documentation  Taken 12/6/2024 1251 by Laxmi Estrada RN  Pain Management Interventions: medication (see MAR)  Taken 12/6/2024 1055 by Laxmi Estrada RN  Pain Management Interventions: medication (see MAR)  Goal: Readiness for Transition of Care  Outcome: Progressing   Goal Outcome Evaluation:         Pt a/o x4, cooperative. Simona  use this am x2, now pt has family at bedside. Pt reported back pain, sharp 5/10--prn tylenol given, did not help; iv dilauded given, pt said was helpful. Denies pain in afternoon. Tele tracing normal sinus, sinus tacchy. Npo all shift, pt requesting to eat. Md aware. Writer notified Dr Constantino also of low potassium and phos. Pt had 2 episodes of diarrhea this shift, also slight fecal incont one of those times. Iv abx given as ordered. Pt walked to bathroom down ferreira x2, gait steady. Iv fluid infusing as ordered. Report given to P1 nurse, will be transporting shortly to room 127.

## 2024-12-06 NOTE — PROGRESS NOTES
[facility] Urology Progress Note      Assessment and Plan:     Rere Robbins is a 69 year old year old male with a history of DM2 and urologic history of urethral stricture s/p SPT (2023) and BMG urethroplasty (11/15/24). Seen by Dr. Doan 12/2/24 with reassuring VCUG and SPT removed. He was admitted for pyelonephritis and AUR. Unable to place fair. S/p cystoscopy with successful fair placement with Dr. Verma 12/5/24. Patient afebrile hemodynamically stable. WBC 12.6 (15.7). Hbg 13.9 (16.3). Cr 1.23 (2.40). UA with positive nitrites.    UC from 6/22/24 with >100K CFU/mL E.Coli and P. Aeruginosa. Resistances to ampicillin, ampicillin/sulbactam, cefazolin, cipro, levo, bactrim. Sensitive to ceftriaxone.    - Continue Zosyn until UC sensitivities result, then narrow appropriately. Discharge with the same  - Continue to monitor Cr  - Recommend starting Flomax 0.4mg daily. Discharge with the same. Monitor for hypotension  - Tylenol PRN for flank and abdominal discomfort  - Keep fair in place until outpatient follow up with Dr. Jimenez    Discussed the case with Dr. Verma who is in agreement with the plan    Tg Hernandez PA-C  [facility] Urology  Office: [phone]  Pager: Melania ([facility] 7:30AM-4PM,  7:30AM-4PM, Tu OFF)          Interval History:     Patient doing well. Fair draining dark colored urine, no sign of gross hematuria or clots. Patient notes bilateral flank discomfort and mild abdominal pain. Otherwise, denies nausea, vomiting. No other concerns.          Medications:     Current Facility-Administered Medications   Medication Dose Route Frequency Provider Last Rate Last Admin    acetaminophen (TYLENOL) tablet 650 mg  650 mg Oral Q4H PRN Toby Constantino MD   650 mg at 12/06/24 1055    Or    acetaminophen (TYLENOL) Suppository 650 mg  650 mg Rectal Q4H PRN Toby Constantino MD        [Held by provider] amLODIPine (NORVASC) tablet 5 mg  5 mg Oral Daily Toby Constantino MD         ARIPiprazole (ABILIFY) tablet 5 mg  5 mg Oral Daily Toby Constantino MD   5 mg at 12/06/24 1052    calcium carbonate (TUMS) chewable tablet 1,000 mg  1,000 mg Oral 4x Daily PRN Toby Constantino MD        famotidine (PEPCID) injection 20 mg  20 mg Intravenous Q48H Toby Constantino MD   20 mg at 12/06/24 1052    ferrous gluconate (FERGON) tablet 324 mg  324 mg Oral At Bedtime Toby Constantino MD        FLUoxetine (PROzac) capsule 20 mg  20 mg Oral Daily Toby Constantino MD   20 mg at 12/06/24 1051    HYDROmorphone (DILAUDID) injection 0.2 mg  0.2 mg Intravenous Q2H PRN Toby Constantino MD   0.2 mg at 12/06/24 1251    HYDROmorphone (DILAUDID) injection 0.4 mg  0.4 mg Intravenous Q2H PRN Toby Constantino MD        levothyroxine (SYNTHROID/LEVOTHROID) tablet 25 mcg  25 mcg Oral QAM  Toby Constantino MD   25 mcg at 12/06/24 1051    lidocaine (LMX4) cream   Topical Q1H PRN Toby Constantino MD        lidocaine 1 % 0.1-1 mL  0.1-1 mL Other Q1H PRN Toby Constantino MD        melatonin tablet 3 mg  3 mg Oral At Bedtime PRN Toby Constantino MD        multivitamin, therapeutic (THERA-VIT) tablet 1 tablet  1 tablet Oral Daily with lunch Toby Constantino MD        ondansetron (ZOFRAN ODT) ODT tab 4 mg  4 mg Oral Q6H PRN Toby Constantino MD        Or    ondansetron (ZOFRAN) injection 4 mg  4 mg Intravenous Q6H PRN Toby Constantino MD        pantoprazole (PROTONIX) EC tablet 40 mg  40 mg Oral QAM AC Toby Constantino MD   40 mg at 12/06/24 1051    piperacillin-tazobactam (ZOSYN) 3.375 g vial to attach to  mL bag  3.375 g Intravenous Q8H Toby Constantino MD   3.375 g at 12/06/24 0619    senna-docusate (SENOKOT-S/PERICOLACE) 8.6-50 MG per tablet 1 tablet  1 tablet Oral BID PRN Toby Constantino MD        Or    senna-docusate (SENOKOT-S/PERICOLACE) 8.6-50 MG per tablet 2 tablet  2 tablet Oral BID PRN Toby Constantino MD        simvastatin (ZOCOR) tablet 10 mg  10 mg Oral At Bedtime Toby Constantino MD         sodium chloride (PF) 0.9% PF flush 3 mL  3 mL Intracatheter Q8H Toby Constantino MD        sodium chloride (PF) 0.9% PF flush 3 mL  3 mL Intracatheter q1 min prn Toby Constantino MD        sodium chloride 0.9 % infusion   Intravenous Continuous Toby Constantino  mL/hr at 12/06/24 1050 New Bag at 12/06/24 1050    traZODone (DESYREL) tablet 100 mg  100 mg Oral At Bedtime Toby Constantino MD        Vitamin D3 (CHOLECALCIFEROL) tablet 5,000 Units  5,000 Units Oral Daily Toby Constantino MD   5,000 Units at 12/06/24 1051     Current Outpatient Medications   Medication Sig Dispense Refill    acetaminophen (TYLENOL) 650 MG CR tablet Take 1,300 mg by mouth every 8 hours.      amLODIPine (NORVASC) 5 MG tablet Take 5 mg by mouth daily      ARIPiprazole (ABILIFY) 5 MG tablet Take 5 mg by mouth daily      chlorhexidine (PERIDEX) 0.12 % solution Swish and spit 15 mLs in mouth 2 times daily. 473 mL 0    diclofenac (VOLTAREN) 1 % topical gel Apply 2 g topically 4 times daily as needed for moderate pain (right knee)      ferrous gluconate (FERGON) 324 (38 Fe) MG tablet Take 1 tablet by mouth at bedtime      fish oil-omega-3 fatty acids 1000 MG capsule Take 2 g by mouth daily      FLUoxetine (PROZAC) 20 MG capsule Take 20 mg by mouth daily      levothyroxine (SYNTHROID, LEVOTHROID) 25 MCG tablet [LEVOTHYROXINE (SYNTHROID, LEVOTHROID) 25 MCG TABLET] Take 1 tablet by mouth daily.      multivitamin (ONE A DAY) per tablet Take 1 tablet by mouth daily      omeprazole (PRILOSEC) 20 MG capsule Take 20 mg by mouth daily      simvastatin (ZOCOR) 10 MG tablet Take 10 mg by mouth at bedtime      traZODone (DESYREL) 50 MG tablet Take 100 mg by mouth at bedtime. May take  mg      VITAMIN D3 2,000 unit capsule Take 4,000 Units by mouth daily  3              Physical Exam:   Vitals were reviewed  Patient Vitals for the past 8 hrs:   BP Temp Pulse Resp SpO2   12/06/24 0758 -- 98  F (36.7  C) -- -- --   12/06/24 0730 138/79 -- 104  "29 96 %     GEN: NAD, lying in bed  EYES: EOMI  : Bilateral flank with  mild TTP. Abdomen soft, nontender. Chase draining dark colored urine. No hematuria or clots       Laboratory Results:   CBC RESULTS:  Recent Labs   Lab Test 12/06/24  0642 12/05/24  1703 06/25/24  0719 06/24/24  0655 06/23/24  0725 06/22/24  1511   WBC 12.6* 15.7* 13.0* 12.2* 14.2* 16.7*   HGB 13.9 16.3  --   --  13.0* 15.6    350 338  --  288 293        BMP RESULTS:  Recent Labs   Lab Test 12/06/24  0642 12/05/24  1703 10/30/24  1041 06/26/24  0727 06/25/24  1421 06/25/24  0719 06/23/24  1514 06/23/24  0725 09/14/21  1033 07/15/20  0826 05/15/19  1618 04/03/19  0833 07/18/18  0749    130* 139  --   --   --   --  139   < > 140 141 141 140   POTASSIUM 3.3* 4.7 3.6 3.7   < > 3.4   < > 3.2*   < > 3.7 4.0 3.5 4.2   CHLORIDE 105 97* 103  --   --   --   --  106   < > 106 105 108* 101   CO2 20* 17* 18*  --   --   --   --  22   < > 23 26 20* 25   ANIONGAP 12 16* 18*  --   --   --   --  11   < > 11 10 13 14   * 136* 116*  --   --   --   --  103*   < > 97 117 134* 101   BUN 14.1 21.6 13.5  --   --   --   --  8.6   < > 13 11 11 15   CR 1.23* 2.40* 1.10  --   --  0.99  --  0.98   < > 0.99 1.03 0.83 0.99   GFRESTIMATED 64 29* 74  --   --  83  --  85   < > >60 >60 >60 >60   GFRESTBLACK  --   --   --   --   --   --   --   --   --  >60 >60 >60 >60    < > = values in this interval not displayed.       CALCIUM RESULTS:  Recent Labs   Lab Test 12/06/24  0642 12/05/24  1703 10/30/24  1041 06/23/24  0725   KETAN 8.6* 10.0 9.8 8.6*       PTH RESULTS:  No results for input(s): \"PTHI\" in the last 12843 hours.    HGB A1C RESULTS:  Lab Results   Component Value Date    A1C 4.9 12/21/2022    A1C 4.4 04/03/2019       UA RESULTS:   Recent Labs   Lab Test 12/05/24  1635 06/22/24  1512 11/28/23  0859   SG 1.010 1.019 <1.005   URINEPH 6.0 6.0 5.0   NITRITE Positive* Negative Negative   RBCU 0 2 >182*   WBCU >182* 172* 66*       PSA RESULTS  Prostate " Specific Antigen Screen   Date Value Ref Range Status   05/25/2022 5.75 (H) 0.00 - 4.50 ug/L Final   12/28/2021 4.77 (H) 0.00 - 4.50 ug/L Final   11/24/2021 5.42 (H) 0.00 - 4.50 ug/L Final       Recent Imaging Report    I personally reviewed all applicable imaging and went over the below findings with patient.    Results for orders placed or performed during the hospital encounter of 12/05/24   CT Abdomen Pelvis w/o Contrast    Narrative    EXAM: CT ABDOMEN PELVIS W/O CONTRAST  LOCATION: [facility]  DATE: 12/5/2024    INDICATION: elevated Cr. Abdominal pain  COMPARISON: CT abdomen pelvis 6/22/2024.  TECHNIQUE: CT scan of the abdomen and pelvis was performed without IV contrast. Multiplanar reformats were obtained. Dose reduction techniques were used.  CONTRAST: None.    FINDINGS:   LOWER CHEST: Mild bibasilar atelectasis. Multivessel coronary artery calcification.    HEPATOBILIARY: Hepatic steatosis. Normal gallbladder and bile ducts.    PANCREAS: Normal.    SPLEEN: Normal.    ADRENAL GLANDS: Normal.    KIDNEYS/BLADDER: Chase catheter decompresses the urinary bladder. Mild bilateral perinephric inflammatory stranding. No significant hydronephrosis. Small benign cyst of the left kidney not warranting follow-up.    BOWEL: No obstruction or inflammation. Normal appendix.    LYMPH NODES: Normal.    VASCULATURE: Mild aortoiliac atherosclerosis.    PELVIC ORGANS: Moderate enlargement of the prostate gland.    MUSCULOSKELETAL: Chronic deformity of the pelvic bones. Degenerative changes of the spine.      Impression    IMPRESSION:   1.  Urinary bladder decompressed by a Chase catheter. Mild bilateral perinephric inflammatory stranding but no significant hydronephrosis. Correlation with urinalysis suggested.  2.  Moderate enlargement of the prostate gland.  3.  Hepatic steatosis.  4.  Coronary artery disease.

## 2024-12-06 NOTE — PHARMACY-ADMISSION MEDICATION HISTORY
Pharmacist Admission Medication History    Admission medication history is complete. The information provided in this note is only as accurate as the sources available at the time of the update.    Information Source(s): Patient and CareEverywhere/SureScripts via in-person    Pertinent Information:     Changes made to PTA medication list:  Added: None  Deleted: oxybutynin 5 mg TID - 14 day course, sennakot   Changed: Tylenol to CR formulation     Allergies reviewed with patient and updates made in EHR: yes    Medication History Completed By: Georgina Reilly [facility] 12/6/2024 8:30 AM    Prior to Admission medications    Medication Sig Last Dose Taking? Auth Provider Long Term End Date   acetaminophen (TYLENOL) 650 MG CR tablet Take 1,300 mg by mouth every 8 hours. 12/5/2024 Morning Yes Unknown, Entered By History No    amLODIPine (NORVASC) 5 MG tablet Take 5 mg by mouth daily 12/5/2024 Morning Yes Reported, Patient No    ARIPiprazole (ABILIFY) 5 MG tablet Take 5 mg by mouth daily 12/5/2024 Morning Yes Provider, Historical     chlorhexidine (PERIDEX) 0.12 % solution Swish and spit 15 mLs in mouth 2 times daily. Unknown Yes Wilfred Doan MD     diclofenac (VOLTAREN) 1 % topical gel Apply 2 g topically 4 times daily as needed for moderate pain (right knee) Unknown Yes Unknown, Entered By History     ferrous gluconate (FERGON) 324 (38 Fe) MG tablet Take 1 tablet by mouth at bedtime Past Week Evening Yes Reported, Patient     fish oil-omega-3 fatty acids 1000 MG capsule Take 2 g by mouth daily 12/5/2024 Morning Yes Unknown, Entered By History     FLUoxetine (PROZAC) 20 MG capsule Take 20 mg by mouth daily 12/5/2024 Morning Yes Provider, Historical     levothyroxine (SYNTHROID, LEVOTHROID) 25 MCG tablet [LEVOTHYROXINE (SYNTHROID, LEVOTHROID) 25 MCG TABLET] Take 1 tablet by mouth daily. 12/5/2024 Morning Yes Provider, Historical     multivitamin (ONE A DAY) per tablet Take 1 tablet by mouth daily 12/5/2024 Morning Yes  Provider, Historical     omeprazole (PRILOSEC) 20 MG capsule Take 20 mg by mouth daily 12/5/2024 Morning Yes Provider, Historical     simvastatin (ZOCOR) 10 MG tablet Take 10 mg by mouth at bedtime Past Week Evening Yes Unknown, Entered By History Yes    traZODone (DESYREL) 50 MG tablet Take 100 mg by mouth at bedtime. May take  mg Past Week Evening Yes Unknown, Entered By History Yes    VITAMIN D3 2,000 unit capsule Take 4,000 Units by mouth daily 12/5/2024 Morning Yes Provider, Historical

## 2024-12-07 LAB
ANION GAP SERPL CALCULATED.3IONS-SCNC: 12 MMOL/L (ref 7–15)
BUN SERPL-MCNC: 11.6 MG/DL (ref 8–23)
CALCIUM SERPL-MCNC: 8.5 MG/DL (ref 8.8–10.4)
CHLORIDE SERPL-SCNC: 105 MMOL/L (ref 98–107)
CREAT SERPL-MCNC: 1.08 MG/DL (ref 0.67–1.17)
EGFRCR SERPLBLD CKD-EPI 2021: 75 ML/MIN/1.73M2
ERYTHROCYTE [DISTWIDTH] IN BLOOD BY AUTOMATED COUNT: 14.9 % (ref 10–15)
GLUCOSE SERPL-MCNC: 90 MG/DL (ref 70–99)
HCO3 SERPL-SCNC: 19 MMOL/L (ref 22–29)
HCT VFR BLD AUTO: 38.1 % (ref 40–53)
HGB BLD-MCNC: 13.1 G/DL (ref 13.3–17.7)
MAGNESIUM SERPL-MCNC: 1.8 MG/DL (ref 1.7–2.3)
MCH RBC QN AUTO: 29.2 PG (ref 26.5–33)
MCHC RBC AUTO-ENTMCNC: 34.4 G/DL (ref 31.5–36.5)
MCV RBC AUTO: 85 FL (ref 78–100)
PHOSPHATE SERPL-MCNC: 1.8 MG/DL (ref 2.5–4.5)
PLATELET # BLD AUTO: 263 10E3/UL (ref 150–450)
POTASSIUM SERPL-SCNC: 3.5 MMOL/L (ref 3.4–5.3)
RBC # BLD AUTO: 4.48 10E6/UL (ref 4.4–5.9)
SODIUM SERPL-SCNC: 136 MMOL/L (ref 135–145)
WBC # BLD AUTO: 8.6 10E3/UL (ref 4–11)

## 2024-12-07 PROCEDURE — 250N000009 HC RX 250: Performed by: STUDENT IN AN ORGANIZED HEALTH CARE EDUCATION/TRAINING PROGRAM

## 2024-12-07 PROCEDURE — 82374 ASSAY BLOOD CARBON DIOXIDE: CPT | Performed by: STUDENT IN AN ORGANIZED HEALTH CARE EDUCATION/TRAINING PROGRAM

## 2024-12-07 PROCEDURE — 80048 BASIC METABOLIC PNL TOTAL CA: CPT | Performed by: STUDENT IN AN ORGANIZED HEALTH CARE EDUCATION/TRAINING PROGRAM

## 2024-12-07 PROCEDURE — 84100 ASSAY OF PHOSPHORUS: CPT | Performed by: STUDENT IN AN ORGANIZED HEALTH CARE EDUCATION/TRAINING PROGRAM

## 2024-12-07 PROCEDURE — 85014 HEMATOCRIT: CPT | Performed by: STUDENT IN AN ORGANIZED HEALTH CARE EDUCATION/TRAINING PROGRAM

## 2024-12-07 PROCEDURE — 83735 ASSAY OF MAGNESIUM: CPT | Performed by: STUDENT IN AN ORGANIZED HEALTH CARE EDUCATION/TRAINING PROGRAM

## 2024-12-07 PROCEDURE — 258N000003 HC RX IP 258 OP 636: Performed by: STUDENT IN AN ORGANIZED HEALTH CARE EDUCATION/TRAINING PROGRAM

## 2024-12-07 PROCEDURE — 250N000013 HC RX MED GY IP 250 OP 250 PS 637: Performed by: STUDENT IN AN ORGANIZED HEALTH CARE EDUCATION/TRAINING PROGRAM

## 2024-12-07 PROCEDURE — 120N000001 HC R&B MED SURG/OB

## 2024-12-07 PROCEDURE — 99232 SBSQ HOSP IP/OBS MODERATE 35: CPT | Performed by: STUDENT IN AN ORGANIZED HEALTH CARE EDUCATION/TRAINING PROGRAM

## 2024-12-07 PROCEDURE — 250N000011 HC RX IP 250 OP 636: Performed by: STUDENT IN AN ORGANIZED HEALTH CARE EDUCATION/TRAINING PROGRAM

## 2024-12-07 PROCEDURE — 36415 COLL VENOUS BLD VENIPUNCTURE: CPT | Performed by: STUDENT IN AN ORGANIZED HEALTH CARE EDUCATION/TRAINING PROGRAM

## 2024-12-07 RX ORDER — CIPROFLOXACIN 500 MG/1
500 TABLET, FILM COATED ORAL EVERY 12 HOURS SCHEDULED
Status: DISCONTINUED | OUTPATIENT
Start: 2024-12-07 | End: 2024-12-08 | Stop reason: HOSPADM

## 2024-12-07 RX ORDER — POTASSIUM CHLORIDE 1500 MG/1
40 TABLET, EXTENDED RELEASE ORAL ONCE
Status: COMPLETED | OUTPATIENT
Start: 2024-12-07 | End: 2024-12-07

## 2024-12-07 RX ORDER — ENOXAPARIN SODIUM 100 MG/ML
40 INJECTION SUBCUTANEOUS EVERY 24 HOURS
Status: DISCONTINUED | OUTPATIENT
Start: 2024-12-07 | End: 2024-12-08 | Stop reason: HOSPADM

## 2024-12-07 RX ORDER — AMLODIPINE BESYLATE 5 MG/1
5 TABLET ORAL DAILY
Status: DISCONTINUED | OUTPATIENT
Start: 2024-12-07 | End: 2024-12-08 | Stop reason: HOSPADM

## 2024-12-07 RX ADMIN — POTASSIUM CHLORIDE 40 MEQ: 1500 TABLET, EXTENDED RELEASE ORAL at 09:23

## 2024-12-07 RX ADMIN — THERA TABS 1 TABLET: TAB at 11:49

## 2024-12-07 RX ADMIN — CIPROFLOXACIN 500 MG: 500 TABLET ORAL at 20:06

## 2024-12-07 RX ADMIN — CIPROFLOXACIN 500 MG: 500 TABLET ORAL at 10:10

## 2024-12-07 RX ADMIN — SIMVASTATIN 10 MG: 10 TABLET, FILM COATED ORAL at 21:26

## 2024-12-07 RX ADMIN — FLUOXETINE 20 MG: 20 CAPSULE ORAL at 09:13

## 2024-12-07 RX ADMIN — PANTOPRAZOLE SODIUM 40 MG: 40 TABLET, DELAYED RELEASE ORAL at 06:30

## 2024-12-07 RX ADMIN — LEVOTHYROXINE SODIUM 25 MCG: 0.03 TABLET ORAL at 06:30

## 2024-12-07 RX ADMIN — TAMSULOSIN HYDROCHLORIDE 0.4 MG: 0.4 CAPSULE ORAL at 09:14

## 2024-12-07 RX ADMIN — CEFEPIME HYDROCHLORIDE 2 G: 2 INJECTION, POWDER, FOR SOLUTION INTRAVENOUS at 06:33

## 2024-12-07 RX ADMIN — AMLODIPINE BESYLATE 5 MG: 5 TABLET ORAL at 09:23

## 2024-12-07 RX ADMIN — Medication 5000 UNITS: at 09:13

## 2024-12-07 RX ADMIN — POTASSIUM PHOSPHATE, MONOBASIC POTASSIUM PHOSPHATE, DIBASIC 15 MMOL: 224; 236 INJECTION, SOLUTION, CONCENTRATE INTRAVENOUS at 10:10

## 2024-12-07 RX ADMIN — ENOXAPARIN SODIUM 40 MG: 40 INJECTION SUBCUTANEOUS at 17:36

## 2024-12-07 RX ADMIN — TRAZODONE HYDROCHLORIDE 100 MG: 50 TABLET ORAL at 21:25

## 2024-12-07 RX ADMIN — ARIPIPRAZOLE 5 MG: 5 TABLET ORAL at 09:14

## 2024-12-07 RX ADMIN — FERROUS GLUCONATE 324 MG: 324 TABLET ORAL at 21:26

## 2024-12-07 ASSESSMENT — ACTIVITIES OF DAILY LIVING (ADL)
ADLS_ACUITY_SCORE: 41

## 2024-12-07 NOTE — PROGRESS NOTES
[facility]    Medicine Progress Note - Hospitalist Service    Date of Admission:  12/5/2024    Assessment & Plan   Pah Itzel is a 69 year old ranjit male with a medical history significant for hypothyroidism, gout, type 2 DM, GERD, depression, s/p supra catheter removal three days ago,  and other medical co morbidities who is admitted with sepsis in the setting of acute pyelonephritis, obstructive uropathy with acute renal failure.      Assessment and Plan    #Acute Urinary Retention  #Acute Renal Failure 2/2 Obstructive Uropathy  #Complicated Urinary Tract Infection  - History of urethral stricture s/p SPT (2023) and BMG urethroplasty (11/15/24)   - Patient had catheter removed about 4 days prior to ED presentation  - Had Fair catheter placed with cystoscopy in the ED with help from Urology.  - Baseline creatinine ~1.0. Creatinine elevated to 2.40 in ED.  - UA suggestive of UTI and UC growing Enterobacter.  Plan  - Urology following appreciate rect  - Change Cefepime to PO Ciprofloxacin - plan for 7 days for complicated UTI.  - Started flomax 0.4mg daily  - Plan to discharge with fair in place  - Follow daily creatinine  - Blood culture not drawn on admission - will hold off after multiple antibiotic doses.    #Hypertension  - Home medications include amlodipine 5mg daily  Plan  - Continue    #Hypothyroidism  - Continue home levothyroxine    #Depression  - Continue home fluoxetine and Abilify  - Continue Trazodone at night.            Diet: Regular Diet Adult    DVT Prophylaxis: Enoxaparin (Lovenox) SQ and Pneumatic Compression Devices  Fair Catheter: PRESENT, indication: Acute retention or obstruction  Lines: None     Cardiac Monitoring: ACTIVE order. Indication: Tachyarrhythmias, acute (48 hours)  Code Status: Full Code      Clinically Significant Risk Factors        # Hypokalemia: Lowest K = 3.3 mmol/L in last 2 days, will replace as needed  # Hyponatremia: Lowest Na = 130 mmol/L in  "last 2 days, will monitor as appropriate  # Hypochloremia: Lowest Cl = 97 mmol/L in last 2 days, will monitor as appropriate  # Hypocalcemia: Lowest Ca = 8.5 mg/dL in last 2 days, will monitor and replace as appropriate     # Hypoalbuminemia: Lowest albumin = 3.4 g/dL at 12/6/2024  6:42 AM, will monitor as appropriate                # Overweight: Estimated body mass index is 25.68 kg/m  as calculated from the following:    Height as of this encounter: 1.575 m (5' 2\").    Weight as of this encounter: 63.7 kg (140 lb 6.4 oz)., PRESENT ON ADMISSION            Social Drivers of Health    Tobacco Use: Medium Risk (12/2/2024)    Patient History     Smoking Tobacco Use: Former     Smokeless Tobacco Use: Former          Disposition Plan     Medically Ready for Discharge: Anticipated Tomorrow             Toby Constantino MD  Hospitalist Service  [facility]  Securely message with CX (more info)  Text page via [employer] Paging/Directory   ______________________________________________________________________    Interval History   - Continues to have diarrhea - reports few bowel movements today  - No problems with urination - fair in place with minimal discomfort.    Physical Exam   Vital Signs: Temp: 98.2  F (36.8  C) Temp src: Oral BP: 139/66 Pulse: 82   Resp: 24 SpO2: 96 % O2 Device: None (Room air)    Weight: 140 lbs 6.4 oz    General: Alert and Oriented x3. Answers questions appropriately. Follows commands.  Eyes:EOMI. PERRL. Normal Conjunctivae.  HENT: Normocephalic. Atraumatic.  Resp: Breath sounds equal throughout. No crackles. No wheezing.  Cardio: Regular rate and rhythm. No murmurs. No friction rub.  Abd: Well healed midline abdominal scar. Non-distended. Non-tender. Bowel sounds normal. No rebound tenderness.  MSK: No areas of ecchymosis or erythema.  Neuro: CN 2-12 grossly intact. Strength 5/5 in all 4 extremities.  Skin:No rashes. No jaundice.     Medical Decision Making       45 MINUTES " SPENT BY ME on the date of service doing chart review, history, exam, documentation & further activities per the note.  MANAGEMENT DISCUSSED with the following over the past 24 hours: RN, JIGNA   Tests ORDERED & REVIEWED in the past 24 hours:  - BMP  - CBC  - Magnesium  - Phosphorus  Medical complexity over the past 24 hours:  - Prescription DRUG MANAGEMENT performed  - Treatment limited by SOCIAL DETERMINANTS OF HEALTH      Data     I have personally reviewed the following data over the past 24 hrs:    8.6  \   13.1 (L)   / 263     136 105 11.6 /  90   3.5 19 (L) 1.08 \       Imaging results reviewed over the past 24 hrs:   No results found for this or any previous visit (from the past 24 hours).

## 2024-12-07 NOTE — PLAN OF CARE
Problem: Adult Inpatient Plan of Care  Goal: Readiness for Transition of Care  Outcome: Progressing     Problem: Infection  Goal: Absence of Infection Signs and Symptoms  Outcome: Progressing     Problem: Adult Inpatient Plan of Care  Goal: Absence of Hospital-Acquired Illness or Injury  Intervention: Identify and Manage Fall Risk  Recent Flowsheet Documentation  Taken 12/7/2024 0000 by Sam Alvarado RN  Safety Promotion/Fall Prevention:   activity supervised   room organization consistent   safety round/check completed   clutter free environment maintained   lighting adjusted   nonskid shoes/slippers when out of bed   patient and family education   room near nurse's station  Intervention: Prevent Skin Injury  Recent Flowsheet Documentation  Taken 12/7/2024 0000 by Sam Alvarado RN  Body Position: position changed independently  Skin Protection: adhesive use limited  Intervention: Prevent and Manage VTE (Venous Thromboembolism) Risk  Recent Flowsheet Documentation  Taken 12/7/2024 0000 by Sam Alvarado RN  VTE Prevention/Management: SCDs on (sequential compression devices)  Intervention: Prevent Infection  Recent Flowsheet Documentation  Taken 12/7/2024 0000 by Sam Alvarado RN  Infection Prevention: rest/sleep promoted   Goal Outcome Evaluation:    Pt is A/O. On tele Nsr. Neuro Q4hrs. Vitals Q4hrs. Regular diet. Baseline: Back and abdominal pain. Lose stool occurrences. SBA.  Independent In room. A very nice binu. Simona speaking.  Salvatore gonzalez. Slept between cares. Left in stable position during handoff to the day nurse.

## 2024-12-07 NOTE — PLAN OF CARE
Goal Outcome Evaluation:      Plan of Care Reviewed With: patient, grandchild(prasanna)    Overall Patient Progress: improvingOverall Patient Progress: improving     Pt is A&Ox4. Simona speaking who needs . VSS on RA. Denies pain. Has a fair cath with good output. SCD's are on. Still needs stool sample for c.diff. NSR on tele. Granddaughter was here earlier and help with communicating with the Pt. Can use call light when needed.

## 2024-12-08 ENCOUNTER — APPOINTMENT (OUTPATIENT)
Dept: INTERPRETER SERVICES | Facility: CLINIC | Age: 69
End: 2024-12-08
Payer: COMMERCIAL

## 2024-12-08 VITALS
HEART RATE: 88 BPM | SYSTOLIC BLOOD PRESSURE: 133 MMHG | DIASTOLIC BLOOD PRESSURE: 71 MMHG | HEIGHT: 62 IN | OXYGEN SATURATION: 95 % | WEIGHT: 140.4 LBS | BODY MASS INDEX: 25.83 KG/M2 | RESPIRATION RATE: 20 BRPM | TEMPERATURE: 98.5 F

## 2024-12-08 PROBLEM — N39.0 SEPSIS SECONDARY TO UTI (H): Status: ACTIVE | Noted: 2024-06-22

## 2024-12-08 PROBLEM — N39.0 COMPLICATED UTI (URINARY TRACT INFECTION): Chronic | Status: ACTIVE | Noted: 2024-12-05

## 2024-12-08 PROBLEM — N39.0 ACUTE UTI: Status: RESOLVED | Noted: 2024-12-05 | Resolved: 2024-12-08

## 2024-12-08 LAB
ANION GAP SERPL CALCULATED.3IONS-SCNC: 13 MMOL/L (ref 7–15)
BUN SERPL-MCNC: 10.8 MG/DL (ref 8–23)
CALCIUM SERPL-MCNC: 9.3 MG/DL (ref 8.8–10.4)
CHLORIDE SERPL-SCNC: 106 MMOL/L (ref 98–107)
CREAT SERPL-MCNC: 1.04 MG/DL (ref 0.67–1.17)
EGFRCR SERPLBLD CKD-EPI 2021: 79 ML/MIN/1.73M2
ERYTHROCYTE [DISTWIDTH] IN BLOOD BY AUTOMATED COUNT: 15 % (ref 10–15)
GLUCOSE SERPL-MCNC: 103 MG/DL (ref 70–99)
HCO3 SERPL-SCNC: 19 MMOL/L (ref 22–29)
HCT VFR BLD AUTO: 43 % (ref 40–53)
HGB BLD-MCNC: 14.6 G/DL (ref 13.3–17.7)
MAGNESIUM SERPL-MCNC: 1.9 MG/DL (ref 1.7–2.3)
MCH RBC QN AUTO: 29 PG (ref 26.5–33)
MCHC RBC AUTO-ENTMCNC: 34 G/DL (ref 31.5–36.5)
MCV RBC AUTO: 85 FL (ref 78–100)
PHOSPHATE SERPL-MCNC: 2.9 MG/DL (ref 2.5–4.5)
PLATELET # BLD AUTO: 258 10E3/UL (ref 150–450)
POTASSIUM SERPL-SCNC: 3.9 MMOL/L (ref 3.4–5.3)
RBC # BLD AUTO: 5.04 10E6/UL (ref 4.4–5.9)
SODIUM SERPL-SCNC: 138 MMOL/L (ref 135–145)
WBC # BLD AUTO: 7.7 10E3/UL (ref 4–11)

## 2024-12-08 PROCEDURE — 99239 HOSP IP/OBS DSCHRG MGMT >30: CPT | Performed by: STUDENT IN AN ORGANIZED HEALTH CARE EDUCATION/TRAINING PROGRAM

## 2024-12-08 PROCEDURE — 36415 COLL VENOUS BLD VENIPUNCTURE: CPT | Performed by: STUDENT IN AN ORGANIZED HEALTH CARE EDUCATION/TRAINING PROGRAM

## 2024-12-08 PROCEDURE — 80048 BASIC METABOLIC PNL TOTAL CA: CPT | Performed by: STUDENT IN AN ORGANIZED HEALTH CARE EDUCATION/TRAINING PROGRAM

## 2024-12-08 PROCEDURE — 85018 HEMOGLOBIN: CPT | Performed by: STUDENT IN AN ORGANIZED HEALTH CARE EDUCATION/TRAINING PROGRAM

## 2024-12-08 PROCEDURE — 250N000011 HC RX IP 250 OP 636: Performed by: STUDENT IN AN ORGANIZED HEALTH CARE EDUCATION/TRAINING PROGRAM

## 2024-12-08 PROCEDURE — 250N000013 HC RX MED GY IP 250 OP 250 PS 637: Performed by: STUDENT IN AN ORGANIZED HEALTH CARE EDUCATION/TRAINING PROGRAM

## 2024-12-08 PROCEDURE — 83735 ASSAY OF MAGNESIUM: CPT | Performed by: STUDENT IN AN ORGANIZED HEALTH CARE EDUCATION/TRAINING PROGRAM

## 2024-12-08 PROCEDURE — 84100 ASSAY OF PHOSPHORUS: CPT | Performed by: STUDENT IN AN ORGANIZED HEALTH CARE EDUCATION/TRAINING PROGRAM

## 2024-12-08 RX ORDER — CIPROFLOXACIN 500 MG/1
500 TABLET, FILM COATED ORAL EVERY 12 HOURS
Qty: 8 TABLET | Refills: 0 | Status: SHIPPED | OUTPATIENT
Start: 2024-12-08 | End: 2024-12-12

## 2024-12-08 RX ORDER — TAMSULOSIN HYDROCHLORIDE 0.4 MG/1
0.4 CAPSULE ORAL DAILY
Qty: 30 CAPSULE | Refills: 1 | Status: SHIPPED | OUTPATIENT
Start: 2024-12-09 | End: 2025-02-07

## 2024-12-08 RX ADMIN — FAMOTIDINE 20 MG: 10 INJECTION, SOLUTION INTRAVENOUS at 08:47

## 2024-12-08 RX ADMIN — CIPROFLOXACIN 500 MG: 500 TABLET ORAL at 08:38

## 2024-12-08 RX ADMIN — THERA TABS 1 TABLET: TAB at 11:54

## 2024-12-08 RX ADMIN — AMLODIPINE BESYLATE 5 MG: 5 TABLET ORAL at 08:38

## 2024-12-08 RX ADMIN — TAMSULOSIN HYDROCHLORIDE 0.4 MG: 0.4 CAPSULE ORAL at 08:38

## 2024-12-08 RX ADMIN — PANTOPRAZOLE SODIUM 40 MG: 40 TABLET, DELAYED RELEASE ORAL at 07:08

## 2024-12-08 RX ADMIN — LEVOTHYROXINE SODIUM 25 MCG: 0.03 TABLET ORAL at 07:08

## 2024-12-08 RX ADMIN — FLUOXETINE 20 MG: 20 CAPSULE ORAL at 08:38

## 2024-12-08 RX ADMIN — Medication 5000 UNITS: at 08:38

## 2024-12-08 RX ADMIN — ARIPIPRAZOLE 5 MG: 5 TABLET ORAL at 08:38

## 2024-12-08 ASSESSMENT — ACTIVITIES OF DAILY LIVING (ADL)
ADLS_ACUITY_SCORE: 41
ADLS_ACUITY_SCORE: 45
ADLS_ACUITY_SCORE: 41
DEPENDENT_IADLS:: CLEANING;COOKING;LAUNDRY;SHOPPING;MEAL PREPARATION;MEDICATION MANAGEMENT;MONEY MANAGEMENT;TRANSPORTATION
ADLS_ACUITY_SCORE: 41
ADLS_ACUITY_SCORE: 45
ADLS_ACUITY_SCORE: 41

## 2024-12-08 NOTE — DISCHARGE INSTRUCTIONS
You were hospitalized for acute urinary retention which caused kidney injury and a urinary tract infection.  You were treated with the antibiotics ciprofloxacin and will be discharged with the antibiotic take 300 mg twice a day for the next 4 days.  You had another Chase catheter placed by urology during your stay, this will be left in place on discharge.  Urology wants you to follow-up with them in the next 1-2 weeks to further evaluate your urethral stricture that you have been following with them for some time.  I will also send a new medication Flomax 0.4 mg daily to your pharmacy to take every day for the foreseeable future.  Your urologist will help you manage this medication as well.

## 2024-12-08 NOTE — PLAN OF CARE
Problem: Infection  Goal: Absence of Infection Signs and Symptoms  Outcome: Progressing  Intervention: Prevent or Manage Infection  Recent Flowsheet Documentation  Taken 12/8/2024 0840 by Jessica Castro RN  Isolation Precautions: enteric precautions maintained     Problem: Urinary Retention  Goal: Effective Urinary Elimination  Outcome: Progressing   Goal Outcome Evaluation:         Pt continues to be alert and denies any pain. Pt's fair patent and draining clear urine. Pt ready for discharge and waiting for grand-daughter to pick him up.      Pt was picked by family. Fair changed to leg bag.Went through discharge teachings with Pt and family at bedside.

## 2024-12-08 NOTE — PLAN OF CARE
Problem: Infection  Goal: Absence of Infection Signs and Symptoms  Outcome: Progressing     Problem: Urinary Retention  Goal: Effective Urinary Elimination  Outcome: Progressing  Intervention: Promote Effective Urine Elimination  Recent Flowsheet Documentation  Taken 12/7/2024 2008 by Sam Alvarado RN  Urinary Elimination Promotion: catheter patency maintained   Goal Outcome Evaluation:       Pt is A/O. Simona speaking. Independent. Take pills whole. NSR on tele. On RA Call light within reach. Slept between care. Bed alarm on. During handoff client was left in stable condition in the care of the Day nurse. Had 1 BM. Pockets medications.

## 2024-12-08 NOTE — CONSULTS
Care Management Initial Consult    General Information  Assessment completed with: Patient, Family (granddaughter Paw via phone), granddaughter Paw  Type of CM/SW Visit: Offer D/C Planning    Primary Care Provider verified and updated as needed:     Readmission within the last 30 days: no previous admission in last 30 days      Reason for Consult: discharge planning  Advance Care Planning: Advance Care Planning Reviewed: no concerns identified        Communication Assessment  Patient's communication style: spoken language (non-English)    Hearing Difficulty or Deaf: no   Wear Glasses or Blind: no    Cognitive  Cognitive/Neuro/Behavioral: WDL                      Living Environment:   People in home: grandchild(prasanna)     Current living Arrangements: house      Able to return to prior arrangements: yes    Family/Social Support:  Care provided by: child(prasanna)  Provides care for: no one  Marital Status: Single  Support system: Other (specify) (grandchild)          Description of Support System: Supportive, Involved    Support Assessment: Adequate family and caregiver support, Adequate social supports    Current Resources:   Patient receiving home care services: Yes     Community Resources: PCA  Equipment currently used at home: walker, rolling, cane, straight  Supplies currently used at home: Other (suprapubic catheter)    Employment/Financial:  Employment Status: retired        Financial Concerns:   none noted    Does the patient's insurance plan have a 3 day qualifying hospital stay waiver?  No    Lifestyle & Psychosocial Needs:  Social Drivers of Health     Food Insecurity: Low Risk  (12/6/2024)    Food Insecurity     Within the past 12 months, did you worry that your food would run out before you got money to buy more?: No     Within the past 12 months, did the food you bought just not last and you didn t have money to get more?: No   Depression: Not at risk (8/25/2022)    PHQ-2     PHQ-2 Score: 0   Housing Stability:  Low Risk  (12/6/2024)    Housing Stability     Do you have housing? : Yes     Are you worried about losing your housing?: No   Tobacco Use: Medium Risk (12/2/2024)    Patient History     Smoking Tobacco Use: Former     Smokeless Tobacco Use: Former     Passive Exposure: Not on file   Financial Resource Strain: Low Risk  (12/6/2024)    Financial Resource Strain     Within the past 12 months, have you or your family members you live with been unable to get utilities (heat, electricity) when it was really needed?: No   Alcohol Use: Not on file   Transportation Needs: Low Risk  (12/6/2024)    Transportation Needs     Within the past 12 months, has lack of transportation kept you from medical appointments, getting your medicines, non-medical meetings or appointments, work, or from getting things that you need?: No   Physical Activity: Not on file   Interpersonal Safety: Low Risk  (12/6/2024)    Interpersonal Safety     Do you feel physically and emotionally safe where you currently live?: Yes     Within the past 12 months, have you been hit, slapped, kicked or otherwise physically hurt by someone?: No     Within the past 12 months, have you been humiliated or emotionally abused in other ways by your partner or ex-partner?: No   Stress: Not on file   Social Connections: Not on file   Health Literacy: Not on file     Functional Status:  Prior to admission patient needed assistance:   Dependent ADLs:: Ambulation-cane, Dressing  Dependent IADLs:: Cleaning, Cooking, Laundry, Shopping, Meal Preparation, Medication Management, Money Management, Transportation     Mental Health Status:  Mental Health Status: No Current Concerns       Chemical Dependency Status:  Chemical Dependency Status: No Current Concerns           Values/Beliefs:  Spiritual, Cultural Beliefs, Rastafarian Practices, Values that affect care: no             Discussed  Partnership in Safe Discharge Planning  document with patient/family: No    Additional  Information:  Chart reviewed and spoke with elen Chow via phone, [phone]. She shares that she is primary caregiver for patient. She assists him with some ADL's and all IADL's. She would be agreeable to having a nurse see him at home. Patient has had home care in the past through [facility] Will send referral there. Elen Chow is available to pick him up from the hospital on discharge.     Patient is ready for discharge. Marcos Chow will come around 3pm to transport him home.    Next Steps: Follow for medical progression and confirm home health agency.    Alejandrina Harris, JOSESW

## 2024-12-08 NOTE — PLAN OF CARE
Problem: Infection  Goal: Absence of Infection Signs and Symptoms  Outcome: Progressing  Intervention: Prevent or Manage Infection  Recent Flowsheet Documentation  Taken 12/7/2024 1615 by Jessica Castro RN  Isolation Precautions: enteric precautions maintained  Taken 12/7/2024 0834 by Jessica Castro RN  Isolation Precautions: enteric precautions maintained     Problem: Urinary Retention  Goal: Effective Urinary Elimination  Outcome: Progressing   Goal Outcome Evaluation:         Pt remains alert and able to make needs known. Pt continues to deny pain and was cooperative with his cares. Chase patent and draining, had about 875 CC clear urine output. Pt ate about 50% and 25% of breakfast and lunch, currently working on dinner. Pt's family visited at bedside. Large loose Bm this AM.

## 2024-12-09 ENCOUNTER — TELEPHONE (OUTPATIENT)
Dept: UROLOGY | Facility: CLINIC | Age: 69
End: 2024-12-09

## 2024-12-09 NOTE — TELEPHONE ENCOUNTER
Spoke with  and granddaughter about upcoming appt 12/20 with Celestina Toney for TOV. Patient does not have insurance, they just found this out with hospital stay. Granddaughter had someone help assist with filling out new forms for insurance coverage and will call clinic on 12/18 to let staff know if they will be able to make appt on 12/20/24. May need to reschedule based on coverage.     Paola Solis  RNCC Urology  Phone: [phone]

## 2024-12-12 ENCOUNTER — PRE VISIT (OUTPATIENT)
Dept: UROLOGY | Facility: CLINIC | Age: 69
End: 2024-12-12

## 2024-12-12 NOTE — TELEPHONE ENCOUNTER
Reason for visit: TOV     Relevant information: **NEEDS AMIRA **, had urethroplasty done on 11/15/24 via     Records/imaging/labs/orders: All records available    At Rooming:  Standard rooming      Stephan Arauz  12/12/2024  10:50 AM

## 2024-12-20 ENCOUNTER — OFFICE VISIT (OUTPATIENT)
Dept: UROLOGY | Facility: CLINIC | Age: 69
End: 2024-12-20
Payer: COMMERCIAL

## 2024-12-20 VITALS — OXYGEN SATURATION: 99 % | DIASTOLIC BLOOD PRESSURE: 78 MMHG | HEART RATE: 98 BPM | SYSTOLIC BLOOD PRESSURE: 118 MMHG

## 2024-12-20 DIAGNOSIS — N35.013 POST-TRAUMATIC ANTERIOR URETHRAL STRICTURE: Primary | ICD-10-CM

## 2024-12-20 DIAGNOSIS — R33.9 URINARY RETENTION: ICD-10-CM

## 2024-12-20 RX ORDER — CIPROFLOXACIN 500 MG/1
500 TABLET, FILM COATED ORAL ONCE
Status: COMPLETED | OUTPATIENT
Start: 2024-12-20 | End: 2024-12-20

## 2024-12-20 RX ADMIN — CIPROFLOXACIN 500 MG: 500 TABLET, FILM COATED ORAL at 16:12

## 2024-12-20 ASSESSMENT — PAIN SCALES - GENERAL: PAINLEVEL_OUTOF10: NO PAIN (0)

## 2024-12-20 NOTE — NURSING NOTE
Chief Complaint   Patient presents with    Follow Up     TOV        Patient Active Problem List   Diagnosis    Myofascial pain    GERD (gastroesophageal reflux disease)    Lumbar radiculopathy    Chronic abdominal pain    history of Neurocysticercosis    Urinary retention    Joseluis hematuria    MAUREEN (acute kidney injury) (H)    Severe sepsis (H)    Lactic acidosis    Acquired hypothyroidism    Gout    History of vitamin D deficiency    Hx of psychosis    Hyperlipidemia    Memory impairment    PTSD (post-traumatic stress disorder)    Recurrent falls    Recurrent major depression in full remission (H)    Anemia due to blood loss, acute    Orchitis and epididymitis    History of diabetes mellitus    Sepsis secondary to UTI (H)    Post-traumatic anterior urethral stricture    Complicated UTI (urinary tract infection)       No Known Allergies    Current Outpatient Medications   Medication Sig Dispense Refill    acetaminophen (TYLENOL) 650 MG CR tablet Take 1,300 mg by mouth every 8 hours.      amLODIPine (NORVASC) 5 MG tablet Take 5 mg by mouth daily      ARIPiprazole (ABILIFY) 5 MG tablet Take 5 mg by mouth daily      chlorhexidine (PERIDEX) 0.12 % solution Swish and spit 15 mLs in mouth 2 times daily. 473 mL 0    diclofenac (VOLTAREN) 1 % topical gel Apply 2 g topically 4 times daily as needed for moderate pain (right knee)      ferrous gluconate (FERGON) 324 (38 Fe) MG tablet Take 1 tablet by mouth at bedtime      fish oil-omega-3 fatty acids 1000 MG capsule Take 2 g by mouth daily      FLUoxetine (PROZAC) 20 MG capsule Take 20 mg by mouth daily      levothyroxine (SYNTHROID, LEVOTHROID) 25 MCG tablet [LEVOTHYROXINE (SYNTHROID, LEVOTHROID) 25 MCG TABLET] Take 1 tablet by mouth daily.      multivitamin (ONE A DAY) per tablet Take 1 tablet by mouth daily      omeprazole (PRILOSEC) 20 MG capsule Take 20 mg by mouth daily      simvastatin (ZOCOR) 10 MG tablet Take 10 mg by mouth at bedtime      tamsulosin (FLOMAX) 0.4 MG  capsule Take 1 capsule (0.4 mg) by mouth daily. 30 capsule 1    traZODone (DESYREL) 50 MG tablet Take 100 mg by mouth at bedtime. May take  mg      VITAMIN D3 2,000 unit capsule Take 4,000 Units by mouth daily  3       Social History     Tobacco Use    Smoking status: Former     Current packs/day: 0.00     Types: Cigarettes     Start date: 5/10/1969     Quit date: 5/10/2019     Years since quittin.6     Passive exposure: Never    Smokeless tobacco: Former     Types: Chew    Tobacco comments:     2 cigarettes/day   Substance Use Topics    Alcohol use: No     Comment: Alcoholic Drinks/day: occasional    Drug use: No       Pah Pwar comes into clinic today at the request of Referred Self for TOV / catheter removal per Wilfred Doan MD .    Patient diagnosis: Acute urinary retention secondary to urethral stricture     This service provided today was under the direct supervision of Celestina Toney DNP, who was available if needed.    Pah Pwar presented today for a trial of void.  Approximately 250 mL of normal saline instilled into bladder via catheter.  Patient stated he had urge to urinate and catheter was removed without difficulty.  Patient was given a urinal to measure urine output.  Patient voided approximately 100 mL of pink urine.   mL.    Cipro 500 given per protocol: Yes  The following medication was given:     MEDICATION:  Ciprofloxacin   ROUTE: PO  SITE: mouth  DOSE: 500 mg  LOT #: [id number]  : [employer]  EXPIRATION DATE: 2025  NDC#: 0367-9256-38   Was there drug waste? No    Maddi Ford LPN  2024  4:02 PM

## 2024-12-20 NOTE — LETTER
12/20/2024       RE: Rere Robbins  [address]  [facility] [address]     Dear Colleague,    Thank you for referring your patient, Rere Robbins, to the [facility] [place] at [facility]. Please see a copy of my visit note below.    HPI:  Mr. Rere Robbins is a 69 year old male w/ history of penile urethral stricture s/p urethroplasty, hospitalized for urosepsis and MAUREEN after fair removal so had fair replaced seen for TOV.       Medsurg history    11/28/2023-cystoscopy in the OR for poor Fair drainage, clot evacuation. subsequently followed by a secondary procedure in which the patient's urethra was noted to be obliterated and unable to gain retrograde access therefore he underwent open suprapubic tube placement.     6/23/2024-orchitis    7/30/24: RUG/VCUG: 3.5 cm stricture of proximal penile urethra/penoscrotal junction  10/7/24: initial visit with SPE     11/15/24: BMG urethroplasty for penile urethral stricture (4 cm stricture) - dorsal onlay + ventral inlay (1.5 cm segment of double-faced BMG urethra)     12/2/24: post-op visit. Doing well overall without significant pain. RUG/VCUG - patent area of repair. No evidence of leakage.     12/5-12/8/24 - hospitalized for urosepsis and MAUREEN. Patient was discharged with a fair.      Today    - discussed with Dr Jimenez. Barak to remove the fair.    - reports no concerns of infection. Fair drains well with clear urine in the past few days.         accompanied by his daughter and .    Reviewed previous notes from Dr. Constantino  from hospitalist service at [facility].    Exam:  /78 (BP Location: Right arm, Patient Position: Sitting, Cuff Size: Adult Regular)   Pulse 98   SpO2 99%   GENERAL: alert and no distress  EYES: Eyes grossly normal to inspection.  No discharge or erythema, or obvious scleral/conjunctival abnormalities.  RESP: No audible wheeze, cough, or visible cyanosis.     SKIN: Visible skin clear. No significant rash, abnormal pigmentation or lesions.  NEURO: Cranial nerves grossly intact.  Mentation and speech appropriate for age.  PSYCH: Appropriate affect, tone, and pace of words    Review of Imaging:  The following imaging exams were independently viewed and interpreted by me and discussed with patient:  HANHP 12/5/24  IMPRESSION:   1.  Urinary bladder decompressed by a Chase catheter. Mild bilateral perinephric inflammatory stranding but no significant hydronephrosis. Correlation with urinalysis suggested.  2.  Moderate enlargement of the prostate gland.  3.  Hepatic steatosis.  4.  Coronary artery disease.         Review of Labs:  The following labs were reviewed by me and discussed with the patient:  Recent Results (from the past 720 hours)   UA with Microscopic reflex to Culture    Collection Time: 12/05/24  4:35 PM    Specimen: Urine, Clean Catch   Result Value Ref Range    Color Urine Orange (A) Colorless, Straw, Light Yellow, Yellow    Appearance Urine Cloudy (A) Clear    Glucose Urine Negative Negative mg/dL    Bilirubin Urine Negative Negative    Ketones Urine Negative Negative mg/dL    Specific Gravity Urine 1.010 1.001 - 1.030    Blood Urine 1.0 mg/dL (A) Negative    pH Urine 6.0 5.0 - 7.0    Protein Albumin Urine 200 (A) Negative mg/dL    Urobilinogen Urine <2.0 <2.0 mg/dL    Nitrite Urine Positive (A) Negative    Leukocyte Esterase Urine 500 Zeeshan/uL (A) Negative    Bacteria Urine Many (A) None Seen /HPF    WBC Clumps Urine Present (A) None Seen /HPF    RBC Urine 0 <=2 /HPF    WBC Urine >182 (H) <=5 /HPF   Urine Culture    Collection Time: 12/05/24  4:35 PM    Specimen: Urine, Clean Catch   Result Value Ref Range    Culture >100,000 CFU/mL Enterobacter cloacae complex (A)        Susceptibility    Enterobacter cloacae complex - CALLIE*     Ampicillin*  Resistant       * Intrinsically Resistant     Ampicillin/ Sulbactam*  Resistant       * Intrinsically Resistant      Piperacillin/Tazobactam  Resistant      Cefazolin*  Resistant       * Cefazolin CALLIE breakpoints are for the treatment of uncomplicated urinary tract infections. For the treatment of systemic infections, please contact the laboratory for additional testing.  Intrinsically Resistant     Cefoxitin*  Resistant       * Intrinsically Resistant     Ceftazidime  Resistant      Ceftriaxone  Resistant      Cefepime <=1 Susceptible ug/mL     Gentamicin <=1 Susceptible ug/mL     Tobramycin <=1 Susceptible ug/mL     Ciprofloxacin <=0.25 Susceptible ug/mL     Levofloxacin <=0.12 Susceptible ug/mL     Nitrofurantoin 32 Susceptible ug/mL     Trimethoprim/Sulfamethoxazole <=1/19 Susceptible ug/mL     * Enterobacter cloacae, Klebsiella aerogenes, and Citrobacter freundii have moderate to high levels of inducible AmpC ß-lactamase expression. The use of 3rd generation cephalosporins including ceftriaxone and ceftazidime, as well as piperacillin-tazobactam, should be avoided for invasive infections, regardless of susceptibility results.   Comprehensive metabolic panel    Collection Time: 12/05/24  5:03 PM   Result Value Ref Range    Sodium 130 (L) 135 - 145 mmol/L    Potassium 4.7 3.4 - 5.3 mmol/L    Carbon Dioxide (CO2) 17 (L) 22 - 29 mmol/L    Anion Gap 16 (H) 7 - 15 mmol/L    Urea Nitrogen 21.6 8.0 - 23.0 mg/dL    Creatinine 2.40 (H) 0.67 - 1.17 mg/dL    GFR Estimate 29 (L) >60 mL/min/1.73m2    Calcium 10.0 8.8 - 10.4 mg/dL    Chloride 97 (L) 98 - 107 mmol/L    Glucose 136 (H) 70 - 99 mg/dL    Alkaline Phosphatase 128 40 - 150 U/L    AST      ALT 43 0 - 70 U/L    Protein Total 9.2 (H) 6.4 - 8.3 g/dL    Albumin 4.2 3.5 - 5.2 g/dL    Bilirubin Total 3.2 (H) <=1.2 mg/dL   CBC with platelets and differential    Collection Time: 12/05/24  5:03 PM   Result Value Ref Range    WBC Count 15.7 (H) 4.0 - 11.0 10e3/uL    RBC Count 5.57 4.40 - 5.90 10e6/uL    Hemoglobin 16.3 13.3 - 17.7 g/dL    Hematocrit 46.9 40.0 - 53.0 %    MCV 84 78 - 100 fL     MCH 29.3 26.5 - 33.0 pg    MCHC 34.8 31.5 - 36.5 g/dL    RDW 14.7 10.0 - 15.0 %    Platelet Count 350 150 - 450 10e3/uL    % Neutrophils 79 %    % Lymphocytes 9 %    % Monocytes 11 %    % Eosinophils 1 %    % Basophils 0 %    % Immature Granulocytes 0 %    NRBCs per 100 WBC 0 <1 /100    Absolute Neutrophils 12.5 (H) 1.6 - 8.3 10e3/uL    Absolute Lymphocytes 1.4 0.8 - 5.3 10e3/uL    Absolute Monocytes 1.7 (H) 0.0 - 1.3 10e3/uL    Absolute Eosinophils 0.1 0.0 - 0.7 10e3/uL    Absolute Basophils 0.1 0.0 - 0.2 10e3/uL    Absolute Immature Granulocytes 0.1 <=0.4 10e3/uL    Absolute NRBCs 0.0 10e3/uL   RBC and Platelet Morphology    Collection Time: 12/05/24  5:03 PM   Result Value Ref Range    RBC Morphology Confirmed RBC Indices     Platelet Assessment  Automated Count Confirmed. Platelet morphology is normal.     Automated Count Confirmed. Platelet morphology is normal.    Target Cells Slight (A) None Seen   Lipase    Collection Time: 12/05/24  8:35 PM   Result Value Ref Range    Lipase 18 13 - 60 U/L   Magnesium    Collection Time: 12/05/24  8:35 PM   Result Value Ref Range    Magnesium 2.3 1.7 - 2.3 mg/dL   Lactic Acid Whole Blood with 1X Repeat in 2 HR when >2    Collection Time: 12/05/24  9:29 PM   Result Value Ref Range    Lactic Acid, Initial 1.4 0.7 - 2.0 mmol/L   Comprehensive metabolic panel    Collection Time: 12/06/24  6:42 AM   Result Value Ref Range    Sodium 137 135 - 145 mmol/L    Potassium 3.3 (L) 3.4 - 5.3 mmol/L    Carbon Dioxide (CO2) 20 (L) 22 - 29 mmol/L    Anion Gap 12 7 - 15 mmol/L    Urea Nitrogen 14.1 8.0 - 23.0 mg/dL    Creatinine 1.23 (H) 0.67 - 1.17 mg/dL    GFR Estimate 64 >60 mL/min/1.73m2    Calcium 8.6 (L) 8.8 - 10.4 mg/dL    Chloride 105 98 - 107 mmol/L    Glucose 112 (H) 70 - 99 mg/dL    Alkaline Phosphatase 109 40 - 150 U/L    AST 25 0 - 45 U/L    ALT 32 0 - 70 U/L    Protein Total 7.0 6.4 - 8.3 g/dL    Albumin 3.4 (L) 3.5 - 5.2 g/dL    Bilirubin Total 2.1 (H) <=1.2 mg/dL   CBC  with platelets and differential    Collection Time: 12/06/24  6:42 AM   Result Value Ref Range    WBC Count 12.6 (H) 4.0 - 11.0 10e3/uL    RBC Count 4.84 4.40 - 5.90 10e6/uL    Hemoglobin 13.9 13.3 - 17.7 g/dL    Hematocrit 40.9 40.0 - 53.0 %    MCV 85 78 - 100 fL    MCH 28.7 26.5 - 33.0 pg    MCHC 34.0 31.5 - 36.5 g/dL    RDW 14.7 10.0 - 15.0 %    Platelet Count 302 150 - 450 10e3/uL    % Neutrophils 77 %    % Lymphocytes 10 %    % Monocytes 11 %    % Eosinophils 2 %    % Basophils 1 %    % Immature Granulocytes 1 %    NRBCs per 100 WBC 0 <1 /100    Absolute Neutrophils 9.6 (H) 1.6 - 8.3 10e3/uL    Absolute Lymphocytes 1.2 0.8 - 5.3 10e3/uL    Absolute Monocytes 1.4 (H) 0.0 - 1.3 10e3/uL    Absolute Eosinophils 0.2 0.0 - 0.7 10e3/uL    Absolute Basophils 0.1 0.0 - 0.2 10e3/uL    Absolute Immature Granulocytes 0.1 <=0.4 10e3/uL    Absolute NRBCs 0.0 10e3/uL   Magnesium    Collection Time: 12/06/24  6:42 AM   Result Value Ref Range    Magnesium 1.9 1.7 - 2.3 mg/dL   Phosphorus    Collection Time: 12/06/24  6:42 AM   Result Value Ref Range    Phosphorus 2.3 (L) 2.5 - 4.5 mg/dL   Basic metabolic panel    Collection Time: 12/07/24  7:57 AM   Result Value Ref Range    Sodium 136 135 - 145 mmol/L    Potassium 3.5 3.4 - 5.3 mmol/L    Chloride 105 98 - 107 mmol/L    Carbon Dioxide (CO2) 19 (L) 22 - 29 mmol/L    Anion Gap 12 7 - 15 mmol/L    Urea Nitrogen 11.6 8.0 - 23.0 mg/dL    Creatinine 1.08 0.67 - 1.17 mg/dL    GFR Estimate 75 >60 mL/min/1.73m2    Calcium 8.5 (L) 8.8 - 10.4 mg/dL    Glucose 90 70 - 99 mg/dL   CBC with platelets    Collection Time: 12/07/24  7:57 AM   Result Value Ref Range    WBC Count 8.6 4.0 - 11.0 10e3/uL    RBC Count 4.48 4.40 - 5.90 10e6/uL    Hemoglobin 13.1 (L) 13.3 - 17.7 g/dL    Hematocrit 38.1 (L) 40.0 - 53.0 %    MCV 85 78 - 100 fL    MCH 29.2 26.5 - 33.0 pg    MCHC 34.4 31.5 - 36.5 g/dL    RDW 14.9 10.0 - 15.0 %    Platelet Count 263 150 - 450 10e3/uL   Magnesium    Collection Time: 12/07/24   7:57 AM   Result Value Ref Range    Magnesium 1.8 1.7 - 2.3 mg/dL   Phosphorus    Collection Time: 12/07/24  7:57 AM   Result Value Ref Range    Phosphorus 1.8 (L) 2.5 - 4.5 mg/dL   Basic metabolic panel    Collection Time: 12/08/24  7:21 AM   Result Value Ref Range    Sodium 138 135 - 145 mmol/L    Potassium 3.9 3.4 - 5.3 mmol/L    Chloride 106 98 - 107 mmol/L    Carbon Dioxide (CO2) 19 (L) 22 - 29 mmol/L    Anion Gap 13 7 - 15 mmol/L    Urea Nitrogen 10.8 8.0 - 23.0 mg/dL    Creatinine 1.04 0.67 - 1.17 mg/dL    GFR Estimate 79 >60 mL/min/1.73m2    Calcium 9.3 8.8 - 10.4 mg/dL    Glucose 103 (H) 70 - 99 mg/dL   CBC with platelets    Collection Time: 12/08/24  7:21 AM   Result Value Ref Range    WBC Count 7.7 4.0 - 11.0 10e3/uL    RBC Count 5.04 4.40 - 5.90 10e6/uL    Hemoglobin 14.6 13.3 - 17.7 g/dL    Hematocrit 43.0 40.0 - 53.0 %    MCV 85 78 - 100 fL    MCH 29.0 26.5 - 33.0 pg    MCHC 34.0 31.5 - 36.5 g/dL    RDW 15.0 10.0 - 15.0 %    Platelet Count 258 150 - 450 10e3/uL   Magnesium    Collection Time: 12/08/24  7:21 AM   Result Value Ref Range    Magnesium 1.9 1.7 - 2.3 mg/dL   Phosphorus    Collection Time: 12/08/24  7:21 AM   Result Value Ref Range    Phosphorus 2.9 2.5 - 4.5 mg/dL         Assessment & Plan  Penile urethral stricture s/p urethroplasty  BPH    - Chase removed today. PVR today 154. He feels that his bladder is completely emptied.  - We discussed that  is somewhat expected with his BPH. Daughter is concerned about him getting another episode of urinary retention.  - Plan to come back for nurse's visit for another PVR within the next 2 weeks.  - continue flomax.  - next visit with Dr Doan 2/24/25.    Mayda Toney NP  [facility] [place]    ==========================      Additional Coding Information:    Problems:  4 -- two or more stable chronic illnesses    Data Reviewed  Review of external notes as documented above     Tests ordered: pvr    Level of risk:  3  -- low risk (e.g., OTC medication or observation, minor surgery without risks)                Again, thank you for allowing me to participate in the care of your patient.      Sincerely,    Mayda Toney NP

## 2024-12-20 NOTE — PROGRESS NOTES
HPI:  Mr. Rere Robbins is a 69 year old male w/ history of penile urethral stricture s/p urethroplasty, hospitalized for urosepsis and MAUREEN after fair removal so had fair replaced seen for TOV.       Medsurg history    11/28/2023-cystoscopy in the OR for poor Fair drainage, clot evacuation. subsequently followed by a secondary procedure in which the patient's urethra was noted to be obliterated and unable to gain retrograde access therefore he underwent open suprapubic tube placement.     6/23/2024-orchitis    7/30/24: RUG/VCUG: 3.5 cm stricture of proximal penile urethra/penoscrotal junction  10/7/24: initial visit with SPE     11/15/24: BMG urethroplasty for penile urethral stricture (4 cm stricture) - dorsal onlay + ventral inlay (1.5 cm segment of double-faced BMG urethra)     12/2/24: post-op visit. Doing well overall without significant pain. RUG/VCUG - patent area of repair. No evidence of leakage.     12/5-12/8/24 - hospitalized for urosepsis and MAUREEN. Patient was discharged with a fair.      Today    - discussed with Dr Jimenez. Barak to remove the fair.    - reports no concerns of infection. Fair drains well with clear urine in the past few days.         accompanied by his daughter and .    Reviewed previous notes from Dr. Constantino  from hospitalist service at [facility].    Exam:  /78 (BP Location: Right arm, Patient Position: Sitting, Cuff Size: Adult Regular)   Pulse 98   SpO2 99%   GENERAL: alert and no distress  EYES: Eyes grossly normal to inspection.  No discharge or erythema, or obvious scleral/conjunctival abnormalities.  RESP: No audible wheeze, cough, or visible cyanosis.    SKIN: Visible skin clear. No significant rash, abnormal pigmentation or lesions.  NEURO: Cranial nerves grossly intact.  Mentation and speech appropriate for age.  PSYCH: Appropriate affect, tone, and pace of words    Review of Imaging:  The following imaging exams were independently viewed and  interpreted by me and discussed with patient:  CTAP 12/5/24  IMPRESSION:   1.  Urinary bladder decompressed by a Chase catheter. Mild bilateral perinephric inflammatory stranding but no significant hydronephrosis. Correlation with urinalysis suggested.  2.  Moderate enlargement of the prostate gland.  3.  Hepatic steatosis.  4.  Coronary artery disease.         Review of Labs:  The following labs were reviewed by me and discussed with the patient:  Recent Results (from the past 720 hours)   UA with Microscopic reflex to Culture    Collection Time: 12/05/24  4:35 PM    Specimen: Urine, Clean Catch   Result Value Ref Range    Color Urine Orange (A) Colorless, Straw, Light Yellow, Yellow    Appearance Urine Cloudy (A) Clear    Glucose Urine Negative Negative mg/dL    Bilirubin Urine Negative Negative    Ketones Urine Negative Negative mg/dL    Specific Gravity Urine 1.010 1.001 - 1.030    Blood Urine 1.0 mg/dL (A) Negative    pH Urine 6.0 5.0 - 7.0    Protein Albumin Urine 200 (A) Negative mg/dL    Urobilinogen Urine <2.0 <2.0 mg/dL    Nitrite Urine Positive (A) Negative    Leukocyte Esterase Urine 500 Zeeshan/uL (A) Negative    Bacteria Urine Many (A) None Seen /HPF    WBC Clumps Urine Present (A) None Seen /HPF    RBC Urine 0 <=2 /HPF    WBC Urine >182 (H) <=5 /HPF   Urine Culture    Collection Time: 12/05/24  4:35 PM    Specimen: Urine, Clean Catch   Result Value Ref Range    Culture >100,000 CFU/mL Enterobacter cloacae complex (A)        Susceptibility    Enterobacter cloacae complex - CALLIE*     Ampicillin*  Resistant       * Intrinsically Resistant     Ampicillin/ Sulbactam*  Resistant       * Intrinsically Resistant     Piperacillin/Tazobactam  Resistant      Cefazolin*  Resistant       * Cefazolin CALLIE breakpoints are for the treatment of uncomplicated urinary tract infections. For the treatment of systemic infections, please contact the laboratory for additional testing.  Intrinsically Resistant     Cefoxitin*   Resistant       * Intrinsically Resistant     Ceftazidime  Resistant      Ceftriaxone  Resistant      Cefepime <=1 Susceptible ug/mL     Gentamicin <=1 Susceptible ug/mL     Tobramycin <=1 Susceptible ug/mL     Ciprofloxacin <=0.25 Susceptible ug/mL     Levofloxacin <=0.12 Susceptible ug/mL     Nitrofurantoin 32 Susceptible ug/mL     Trimethoprim/Sulfamethoxazole <=1/19 Susceptible ug/mL     * Enterobacter cloacae, Klebsiella aerogenes, and Citrobacter freundii have moderate to high levels of inducible AmpC ß-lactamase expression. The use of 3rd generation cephalosporins including ceftriaxone and ceftazidime, as well as piperacillin-tazobactam, should be avoided for invasive infections, regardless of susceptibility results.   Comprehensive metabolic panel    Collection Time: 12/05/24  5:03 PM   Result Value Ref Range    Sodium 130 (L) 135 - 145 mmol/L    Potassium 4.7 3.4 - 5.3 mmol/L    Carbon Dioxide (CO2) 17 (L) 22 - 29 mmol/L    Anion Gap 16 (H) 7 - 15 mmol/L    Urea Nitrogen 21.6 8.0 - 23.0 mg/dL    Creatinine 2.40 (H) 0.67 - 1.17 mg/dL    GFR Estimate 29 (L) >60 mL/min/1.73m2    Calcium 10.0 8.8 - 10.4 mg/dL    Chloride 97 (L) 98 - 107 mmol/L    Glucose 136 (H) 70 - 99 mg/dL    Alkaline Phosphatase 128 40 - 150 U/L    AST      ALT 43 0 - 70 U/L    Protein Total 9.2 (H) 6.4 - 8.3 g/dL    Albumin 4.2 3.5 - 5.2 g/dL    Bilirubin Total 3.2 (H) <=1.2 mg/dL   CBC with platelets and differential    Collection Time: 12/05/24  5:03 PM   Result Value Ref Range    WBC Count 15.7 (H) 4.0 - 11.0 10e3/uL    RBC Count 5.57 4.40 - 5.90 10e6/uL    Hemoglobin 16.3 13.3 - 17.7 g/dL    Hematocrit 46.9 40.0 - 53.0 %    MCV 84 78 - 100 fL    MCH 29.3 26.5 - 33.0 pg    MCHC 34.8 31.5 - 36.5 g/dL    RDW 14.7 10.0 - 15.0 %    Platelet Count 350 150 - 450 10e3/uL    % Neutrophils 79 %    % Lymphocytes 9 %    % Monocytes 11 %    % Eosinophils 1 %    % Basophils 0 %    % Immature Granulocytes 0 %    NRBCs per 100 WBC 0 <1 /100     Absolute Neutrophils 12.5 (H) 1.6 - 8.3 10e3/uL    Absolute Lymphocytes 1.4 0.8 - 5.3 10e3/uL    Absolute Monocytes 1.7 (H) 0.0 - 1.3 10e3/uL    Absolute Eosinophils 0.1 0.0 - 0.7 10e3/uL    Absolute Basophils 0.1 0.0 - 0.2 10e3/uL    Absolute Immature Granulocytes 0.1 <=0.4 10e3/uL    Absolute NRBCs 0.0 10e3/uL   RBC and Platelet Morphology    Collection Time: 12/05/24  5:03 PM   Result Value Ref Range    RBC Morphology Confirmed RBC Indices     Platelet Assessment  Automated Count Confirmed. Platelet morphology is normal.     Automated Count Confirmed. Platelet morphology is normal.    Target Cells Slight (A) None Seen   Lipase    Collection Time: 12/05/24  8:35 PM   Result Value Ref Range    Lipase 18 13 - 60 U/L   Magnesium    Collection Time: 12/05/24  8:35 PM   Result Value Ref Range    Magnesium 2.3 1.7 - 2.3 mg/dL   Lactic Acid Whole Blood with 1X Repeat in 2 HR when >2    Collection Time: 12/05/24  9:29 PM   Result Value Ref Range    Lactic Acid, Initial 1.4 0.7 - 2.0 mmol/L   Comprehensive metabolic panel    Collection Time: 12/06/24  6:42 AM   Result Value Ref Range    Sodium 137 135 - 145 mmol/L    Potassium 3.3 (L) 3.4 - 5.3 mmol/L    Carbon Dioxide (CO2) 20 (L) 22 - 29 mmol/L    Anion Gap 12 7 - 15 mmol/L    Urea Nitrogen 14.1 8.0 - 23.0 mg/dL    Creatinine 1.23 (H) 0.67 - 1.17 mg/dL    GFR Estimate 64 >60 mL/min/1.73m2    Calcium 8.6 (L) 8.8 - 10.4 mg/dL    Chloride 105 98 - 107 mmol/L    Glucose 112 (H) 70 - 99 mg/dL    Alkaline Phosphatase 109 40 - 150 U/L    AST 25 0 - 45 U/L    ALT 32 0 - 70 U/L    Protein Total 7.0 6.4 - 8.3 g/dL    Albumin 3.4 (L) 3.5 - 5.2 g/dL    Bilirubin Total 2.1 (H) <=1.2 mg/dL   CBC with platelets and differential    Collection Time: 12/06/24  6:42 AM   Result Value Ref Range    WBC Count 12.6 (H) 4.0 - 11.0 10e3/uL    RBC Count 4.84 4.40 - 5.90 10e6/uL    Hemoglobin 13.9 13.3 - 17.7 g/dL    Hematocrit 40.9 40.0 - 53.0 %    MCV 85 78 - 100 fL    MCH 28.7 26.5 - 33.0 pg     MCHC 34.0 31.5 - 36.5 g/dL    RDW 14.7 10.0 - 15.0 %    Platelet Count 302 150 - 450 10e3/uL    % Neutrophils 77 %    % Lymphocytes 10 %    % Monocytes 11 %    % Eosinophils 2 %    % Basophils 1 %    % Immature Granulocytes 1 %    NRBCs per 100 WBC 0 <1 /100    Absolute Neutrophils 9.6 (H) 1.6 - 8.3 10e3/uL    Absolute Lymphocytes 1.2 0.8 - 5.3 10e3/uL    Absolute Monocytes 1.4 (H) 0.0 - 1.3 10e3/uL    Absolute Eosinophils 0.2 0.0 - 0.7 10e3/uL    Absolute Basophils 0.1 0.0 - 0.2 10e3/uL    Absolute Immature Granulocytes 0.1 <=0.4 10e3/uL    Absolute NRBCs 0.0 10e3/uL   Magnesium    Collection Time: 12/06/24  6:42 AM   Result Value Ref Range    Magnesium 1.9 1.7 - 2.3 mg/dL   Phosphorus    Collection Time: 12/06/24  6:42 AM   Result Value Ref Range    Phosphorus 2.3 (L) 2.5 - 4.5 mg/dL   Basic metabolic panel    Collection Time: 12/07/24  7:57 AM   Result Value Ref Range    Sodium 136 135 - 145 mmol/L    Potassium 3.5 3.4 - 5.3 mmol/L    Chloride 105 98 - 107 mmol/L    Carbon Dioxide (CO2) 19 (L) 22 - 29 mmol/L    Anion Gap 12 7 - 15 mmol/L    Urea Nitrogen 11.6 8.0 - 23.0 mg/dL    Creatinine 1.08 0.67 - 1.17 mg/dL    GFR Estimate 75 >60 mL/min/1.73m2    Calcium 8.5 (L) 8.8 - 10.4 mg/dL    Glucose 90 70 - 99 mg/dL   CBC with platelets    Collection Time: 12/07/24  7:57 AM   Result Value Ref Range    WBC Count 8.6 4.0 - 11.0 10e3/uL    RBC Count 4.48 4.40 - 5.90 10e6/uL    Hemoglobin 13.1 (L) 13.3 - 17.7 g/dL    Hematocrit 38.1 (L) 40.0 - 53.0 %    MCV 85 78 - 100 fL    MCH 29.2 26.5 - 33.0 pg    MCHC 34.4 31.5 - 36.5 g/dL    RDW 14.9 10.0 - 15.0 %    Platelet Count 263 150 - 450 10e3/uL   Magnesium    Collection Time: 12/07/24  7:57 AM   Result Value Ref Range    Magnesium 1.8 1.7 - 2.3 mg/dL   Phosphorus    Collection Time: 12/07/24  7:57 AM   Result Value Ref Range    Phosphorus 1.8 (L) 2.5 - 4.5 mg/dL   Basic metabolic panel    Collection Time: 12/08/24  7:21 AM   Result Value Ref Range    Sodium 138 135 - 145  mmol/L    Potassium 3.9 3.4 - 5.3 mmol/L    Chloride 106 98 - 107 mmol/L    Carbon Dioxide (CO2) 19 (L) 22 - 29 mmol/L    Anion Gap 13 7 - 15 mmol/L    Urea Nitrogen 10.8 8.0 - 23.0 mg/dL    Creatinine 1.04 0.67 - 1.17 mg/dL    GFR Estimate 79 >60 mL/min/1.73m2    Calcium 9.3 8.8 - 10.4 mg/dL    Glucose 103 (H) 70 - 99 mg/dL   CBC with platelets    Collection Time: 12/08/24  7:21 AM   Result Value Ref Range    WBC Count 7.7 4.0 - 11.0 10e3/uL    RBC Count 5.04 4.40 - 5.90 10e6/uL    Hemoglobin 14.6 13.3 - 17.7 g/dL    Hematocrit 43.0 40.0 - 53.0 %    MCV 85 78 - 100 fL    MCH 29.0 26.5 - 33.0 pg    MCHC 34.0 31.5 - 36.5 g/dL    RDW 15.0 10.0 - 15.0 %    Platelet Count 258 150 - 450 10e3/uL   Magnesium    Collection Time: 12/08/24  7:21 AM   Result Value Ref Range    Magnesium 1.9 1.7 - 2.3 mg/dL   Phosphorus    Collection Time: 12/08/24  7:21 AM   Result Value Ref Range    Phosphorus 2.9 2.5 - 4.5 mg/dL         Assessment & Plan   Penile urethral stricture s/p urethroplasty  BPH    - Chase removed today. PVR today 154. He feels that his bladder is completely emptied.  - We discussed that  is somewhat expected with his BPH. Daughter is concerned about him getting another episode of urinary retention.  - Plan to come back for nurse's visit for another PVR within the next 2 weeks.  - continue flomax.  - next visit with Dr Doan 2/24/25.    Mayda Toney NP  [facility] [place]    ==========================      Additional Coding Information:    Problems:  4 -- two or more stable chronic illnesses    Data Reviewed  Review of external notes as documented above     Tests ordered: pvr    Level of risk:  3 -- low risk (e.g., OTC medication or observation, minor surgery without risks)

## 2025-01-02 ENCOUNTER — LAB REQUISITION (OUTPATIENT)
Dept: LAB | Facility: CLINIC | Age: 70
End: 2025-01-02

## 2025-01-02 DIAGNOSIS — Z79.899 OTHER LONG TERM (CURRENT) DRUG THERAPY: ICD-10-CM

## 2025-01-02 LAB
ALBUMIN SERPL BCG-MCNC: 4.3 G/DL (ref 3.5–5.2)
ALP SERPL-CCNC: 92 U/L (ref 40–150)
ALT SERPL W P-5'-P-CCNC: 18 U/L (ref 0–70)
ANION GAP SERPL CALCULATED.3IONS-SCNC: 15 MMOL/L (ref 7–15)
AST SERPL W P-5'-P-CCNC: 22 U/L (ref 0–45)
BILIRUB SERPL-MCNC: 0.6 MG/DL
BUN SERPL-MCNC: 12.3 MG/DL (ref 8–23)
CALCIUM SERPL-MCNC: 9.8 MG/DL (ref 8.8–10.4)
CHLORIDE SERPL-SCNC: 101 MMOL/L (ref 98–107)
CREAT SERPL-MCNC: 1 MG/DL (ref 0.67–1.17)
EGFRCR SERPLBLD CKD-EPI 2021: 82 ML/MIN/1.73M2
GLUCOSE SERPL-MCNC: 124 MG/DL (ref 70–99)
HCO3 SERPL-SCNC: 23 MMOL/L (ref 22–29)
POTASSIUM SERPL-SCNC: 3.1 MMOL/L (ref 3.4–5.3)
PROT SERPL-MCNC: 7.7 G/DL (ref 6.4–8.3)
SODIUM SERPL-SCNC: 139 MMOL/L (ref 135–145)
T4 FREE SERPL-MCNC: 1.3 NG/DL (ref 0.9–1.7)
TSH SERPL DL<=0.005 MIU/L-ACNC: 3.04 UIU/ML (ref 0.3–4.2)
VIT D+METAB SERPL-MCNC: 31 NG/ML (ref 20–50)

## 2025-01-02 PROCEDURE — 84443 ASSAY THYROID STIM HORMONE: CPT | Performed by: FAMILY MEDICINE

## 2025-01-02 PROCEDURE — 82306 VITAMIN D 25 HYDROXY: CPT | Performed by: FAMILY MEDICINE

## 2025-01-02 PROCEDURE — 84439 ASSAY OF FREE THYROXINE: CPT | Performed by: FAMILY MEDICINE

## 2025-01-02 PROCEDURE — 80053 COMPREHEN METABOLIC PANEL: CPT | Performed by: FAMILY MEDICINE

## 2025-01-04 LAB — BACTERIA UR CULT: NORMAL

## 2025-01-06 ENCOUNTER — OFFICE VISIT (OUTPATIENT)
Dept: UROLOGY | Facility: CLINIC | Age: 70
End: 2025-01-06
Payer: COMMERCIAL

## 2025-01-06 DIAGNOSIS — R33.9 URINARY RETENTION: Primary | ICD-10-CM

## 2025-01-06 PROCEDURE — 99024 POSTOP FOLLOW-UP VISIT: CPT

## 2025-01-06 NOTE — PROGRESS NOTES
Chief Complaint   Patient presents with    Allied Health Visit     PVR  per bbi          Patient Active Problem List   Diagnosis    Myofascial pain    GERD (gastroesophageal reflux disease)    Lumbar radiculopathy    Chronic abdominal pain    history of Neurocysticercosis    Urinary retention    Joseluis hematuria    MAUREEN (acute kidney injury) (H)    Severe sepsis (H)    Lactic acidosis    Acquired hypothyroidism    Gout    History of vitamin D deficiency    Hx of psychosis    Hyperlipidemia    Memory impairment    PTSD (post-traumatic stress disorder)    Recurrent falls    Recurrent major depression in full remission (H)    Anemia due to blood loss, acute    Orchitis and epididymitis    History of diabetes mellitus    Sepsis secondary to UTI (H)    Post-traumatic anterior urethral stricture    Complicated UTI (urinary tract infection)       No Known Allergies    Current Outpatient Medications   Medication Sig Dispense Refill    acetaminophen (TYLENOL) 650 MG CR tablet Take 1,300 mg by mouth every 8 hours.      amLODIPine (NORVASC) 5 MG tablet Take 5 mg by mouth daily      ARIPiprazole (ABILIFY) 5 MG tablet Take 5 mg by mouth daily      chlorhexidine (PERIDEX) 0.12 % solution Swish and spit 15 mLs in mouth 2 times daily. 473 mL 0    diclofenac (VOLTAREN) 1 % topical gel Apply 2 g topically 4 times daily as needed for moderate pain (right knee)      ferrous gluconate (FERGON) 324 (38 Fe) MG tablet Take 1 tablet by mouth at bedtime      fish oil-omega-3 fatty acids 1000 MG capsule Take 2 g by mouth daily      FLUoxetine (PROZAC) 20 MG capsule Take 20 mg by mouth daily      levothyroxine (SYNTHROID, LEVOTHROID) 25 MCG tablet [LEVOTHYROXINE (SYNTHROID, LEVOTHROID) 25 MCG TABLET] Take 1 tablet by mouth daily.      multivitamin (ONE A DAY) per tablet Take 1 tablet by mouth daily      omeprazole (PRILOSEC) 20 MG capsule Take 20 mg by mouth daily      simvastatin (ZOCOR) 10 MG tablet Take 10 mg by mouth at bedtime       tamsulosin (FLOMAX) 0.4 MG capsule Take 1 capsule (0.4 mg) by mouth daily. 30 capsule 1    traZODone (DESYREL) 50 MG tablet Take 100 mg by mouth at bedtime. May take  mg      VITAMIN D3 2,000 unit capsule Take 4,000 Units by mouth daily  3       Social History     Tobacco Use    Smoking status: Former     Current packs/day: 0.00     Types: Cigarettes     Start date: 5/10/1969     Quit date: 5/10/2019     Years since quittin.6     Passive exposure: Never    Smokeless tobacco: Former     Types: Chew    Tobacco comments:     2 cigarettes/day   Substance Use Topics    Alcohol use: No     Comment: Alcoholic Drinks/day: occasional    Drug use: No       Pah Pwar comes into clinic today at the request of Celestina MALONE  for PVR.    Patient diagnosis: Urinary retention    Residual Volume by Ultrasound: 106 mL    Dr. Wilfred Doan was asked by writer if pt was able to [place] [facility] [place] to post urination residual volume. Dr. Gilbert Doan advised writer to send pt home.     Follow up with Wilfred Graham on 25.    This service provided today was under the direct supervision of Wilfred Romero MD, who was available if needed.    Maria R Rojas  2025  3:59 PM

## 2025-02-06 ENCOUNTER — TRANSFERRED RECORDS (OUTPATIENT)
Dept: HEALTH INFORMATION MANAGEMENT | Facility: CLINIC | Age: 70
End: 2025-02-06
Payer: COMMERCIAL

## 2025-02-19 ENCOUNTER — PRE VISIT (OUTPATIENT)
Dept: UROLOGY | Facility: CLINIC | Age: 70
End: 2025-02-19
Payer: COMMERCIAL

## 2025-02-19 NOTE — TELEPHONE ENCOUNTER
Reason for visit: Cystoscopy         Relevant information: **NEEDS AMIRA **, urinary retention, urethroplasty done on 11/15/24, SPT placed on 11/29/23 via TIEN Durand    Records/imaging/labs/orders: all records available    Pt called: no need for a call    At Rooming: ask symptoms, collect a urine/dip    Stephan Arauz  2/19/2025  11:52 AM

## 2025-04-10 ENCOUNTER — TELEPHONE (OUTPATIENT)
Dept: INFECTIOUS DISEASES | Facility: CLINIC | Age: 70
End: 2025-04-10
Payer: COMMERCIAL

## 2025-04-10 NOTE — TELEPHONE ENCOUNTER
JULIO + erik called 4/10 to sched a 1 year follow up with Dr. Dumont per checkout notes from 10/10/24.

## 2025-08-14 ENCOUNTER — TELEPHONE (OUTPATIENT)
Dept: INFECTIOUS DISEASES | Facility: CLINIC | Age: 70
End: 2025-08-14
Payer: COMMERCIAL

## (undated) DEVICE — SUTURE VICRYL+ 1 27IN CT-1 UND VCP261H

## (undated) DEVICE — GLOVE BIOGEL PI ULTRATOUCH G SZ 8.0 42180

## (undated) DEVICE — DRAIN RESERVOIR 100ML JP 0070740

## (undated) DEVICE — DRSG GAUZE 4X4" TRAY 6939

## (undated) DEVICE — SU SILK 3-0 SH 30" K832H

## (undated) DEVICE — ESU PENCIL W/HOLSTER E2350H

## (undated) DEVICE — SUCTION TIP POOLE STERILE 35040

## (undated) DEVICE — SU MONOCRYL 4-0 PS-2 27" UND Y426H

## (undated) DEVICE — SU CHROMIC 4-0 SH 27" G121H

## (undated) DEVICE — GUIDEWIRE BENTSON FLEX TIP 0.035"X150CM M0066201250

## (undated) DEVICE — GLOVE SURGEON PI ORTHO SZ 6-1/2 LF

## (undated) DEVICE — TUBING SUCTION MEDI-VAC 1/4"X20' N620A

## (undated) DEVICE — STRAP CATH LEG ADJUSTABLE 0814-8200

## (undated) DEVICE — DRAPE POUCH INSTRUMENT 1018

## (undated) DEVICE — BAG URINARY DRAIN 2000ML LF 154002

## (undated) DEVICE — GOWN IMPERVIOUS BREATHABLE SMART XLG 89045

## (undated) DEVICE — STPL SKIN 35W 6.9MM  PXW35

## (undated) DEVICE — TOOTHBRUSH ADULT NON STERILE MDS136850

## (undated) DEVICE — LINEN GOWN XLG 5407

## (undated) DEVICE — PREP POVIDONE IODINE SOLUTION 10% 4OZ BOTTLE 29906-004

## (undated) DEVICE — SOL WATER IRRIG 500ML BOTTLE 2F7113

## (undated) DEVICE — SPONGE RAY-TEC 4X8" 7318

## (undated) DEVICE — CONTAINER URINE SPEC 4OZ STRL 1053

## (undated) DEVICE — CATH FOLEY COUNCIL 22FR 5ML LUBRICATH LATEX 0196L22

## (undated) DEVICE — MAT FLOOR SURGICAL 40X38 0702140238

## (undated) DEVICE — CATH FOLYSIL 16FR 15ML AA6116

## (undated) DEVICE — SU SILK 2-0 FS-1 18" 685G

## (undated) DEVICE — SU PDS II 5-0 RB-1 DA 30" Z320H

## (undated) DEVICE — GLOVE BIOGEL PI ULTRATOUCH G SZ 6.5 42165

## (undated) DEVICE — SUCTION MANIFOLD NEPTUNE 2 SYS 4 PORT 0702-020-000

## (undated) DEVICE — LINEN TOWEL PACK X5 5464

## (undated) DEVICE — ENDO SEAL BX PORT BPS-A

## (undated) DEVICE — GLOVE BIOGEL PI MICRO SZ 8.0 48580

## (undated) DEVICE — ESU GROUND PAD ADULT W/CORD E7507

## (undated) DEVICE — KIT ENDO FIRST STEP DISINFECTANT 200ML W/POUCH EP-4

## (undated) DEVICE — DRAIN BLAKE 19FR SIL 2231

## (undated) DEVICE — PACK ENT MINOR CUSTOM ASC

## (undated) DEVICE — BAG URINARY DRAIN 4000ML LF 153509

## (undated) DEVICE — LOOP CUTTING 24FR 30 DEG SCOP A22205C

## (undated) DEVICE — DRSG GAUZE 4X4" TOPPER

## (undated) DEVICE — CATH FOLEY 3WAY 22FR 30ML LUBRICATH LATEX 0167L22

## (undated) DEVICE — DRSG KERLIX FLUFFS X5

## (undated) DEVICE — SOL NACL 0.9% IRRIG 500ML BOTTLE 2F7123

## (undated) DEVICE — DRAPE GYN/UROLOGY FLUID POUCH TUR 29455

## (undated) DEVICE — TUBING IRRIG CYSTO/BLADDER SET 81" LF 2C4040

## (undated) DEVICE — JELLY LUBRICATING SURGILUBE 2OZ TUBE

## (undated) DEVICE — DRSG DRAIN 4X4" 7086

## (undated) DEVICE — SYR 30ML LL W/O NDL 302832

## (undated) DEVICE — Device

## (undated) DEVICE — SOLUTION IRRIG 2B7127 .9NS 3000ML BAG

## (undated) DEVICE — SYR 50ML LL W/O NDL 309653

## (undated) DEVICE — CONNECTOR WATER VALVE PERFUSION PACK STR 020272801

## (undated) DEVICE — PREP DYNA-HEX 4% CHG SCRUB 4OZ BOTTLE MDS098710

## (undated) DEVICE — SOL NACL 0.9% INJ 1000ML BAG 2B1324X

## (undated) DEVICE — GUIDEWIRE SENSOR DUAL FLEX STR 0.035"X150CM M0066703080

## (undated) DEVICE — DRAPE LEGGINGS CLEAR 8430

## (undated) DEVICE — GLOVE SURG PI ULTRA TOUCH M SZ 7-1/2 LF

## (undated) DEVICE — GLOVE BIOGEL PI MICRO SZ 7.5 48575

## (undated) DEVICE — SUCTION MANIFOLD NEPTUNE 2 SYS 1 PORT 702-025-000

## (undated) DEVICE — SYR BULB IRRIG DOVER 60 ML LATEX FREE 67000

## (undated) DEVICE — CATH PLUG W/CAP 000076

## (undated) DEVICE — CUSTOM PACK CYSTO PREFERRED SOT5BCYHEA

## (undated) DEVICE — DRSG TELFA 3X4" 1050

## (undated) DEVICE — GLOVE BIOGEL PI INDICATOR 8.0 LF 41680

## (undated) DEVICE — SOL WATER IRRIG 1000ML BOTTLE 2F7114

## (undated) DEVICE — CABLE ENDOSPOPIC MNPLR 400CM URO HF RESCT WA00393S

## (undated) DEVICE — SU CHROMIC 2-0 SH 27" G123H

## (undated) DEVICE — BLADE CLIPPER DISP 4406

## (undated) DEVICE — STPL SKIN 35W ROTATING HEAD PRW35

## (undated) DEVICE — SYR 50ML CATH TIP W/O NDL 309620

## (undated) DEVICE — TUBING SET THERMEDX UROLOGY SGL USE LL0006

## (undated) DEVICE — LINEN TOWEL PACK X6 WHITE 5487

## (undated) DEVICE — SU VICRYL 4-0 RB-1 27" UND J214H

## (undated) DEVICE — SU VICRYL+ 3-0 27IN SH UND VCP416H

## (undated) DEVICE — PREP CHLORAPREP 26ML TINTED HI-LITE ORANGE 930815

## (undated) DEVICE — SU SILK 2-0 SH CR 8X18" C012D

## (undated) DEVICE — SOL WATER IRRIG 3000ML BAG 2B7117

## (undated) DEVICE — DRAPE STERI TOWEL LG 1010

## (undated) DEVICE — JUMPSUIT CYSTO DISP SD-100

## (undated) DEVICE — PAD CHUX UNDERPAD 30X36" P3036C

## (undated) DEVICE — SUTURE BOOTS 051003PBX

## (undated) RX ORDER — DEXAMETHASONE SODIUM PHOSPHATE 10 MG/ML
INJECTION, SOLUTION INTRAMUSCULAR; INTRAVENOUS
Status: DISPENSED
Start: 2023-11-29

## (undated) RX ORDER — ACETAMINOPHEN 325 MG/1
TABLET ORAL
Status: DISPENSED
Start: 2024-11-15

## (undated) RX ORDER — HYDROMORPHONE HYDROCHLORIDE 1 MG/ML
INJECTION, SOLUTION INTRAMUSCULAR; INTRAVENOUS; SUBCUTANEOUS
Status: DISPENSED
Start: 2024-11-15

## (undated) RX ORDER — FUROSEMIDE 10 MG/ML
INJECTION INTRAMUSCULAR; INTRAVENOUS
Status: DISPENSED
Start: 2023-11-30

## (undated) RX ORDER — PROPOFOL 10 MG/ML
INJECTION, EMULSION INTRAVENOUS
Status: DISPENSED
Start: 2024-11-15

## (undated) RX ORDER — ONDANSETRON 2 MG/ML
INJECTION INTRAMUSCULAR; INTRAVENOUS
Status: DISPENSED
Start: 2023-11-29

## (undated) RX ORDER — PROPOFOL 10 MG/ML
INJECTION, EMULSION INTRAVENOUS
Status: DISPENSED
Start: 2023-11-29

## (undated) RX ORDER — CIPROFLOXACIN 500 MG/1
TABLET, FILM COATED ORAL
Status: DISPENSED
Start: 2024-12-20

## (undated) RX ORDER — LIDOCAINE HYDROCHLORIDE 10 MG/ML
INJECTION, SOLUTION EPIDURAL; INFILTRATION; INTRACAUDAL; PERINEURAL
Status: DISPENSED
Start: 2023-11-29

## (undated) RX ORDER — FENTANYL CITRATE 50 UG/ML
INJECTION, SOLUTION INTRAMUSCULAR; INTRAVENOUS
Status: DISPENSED
Start: 2024-11-15

## (undated) RX ORDER — FENTANYL CITRATE 50 UG/ML
INJECTION, SOLUTION INTRAMUSCULAR; INTRAVENOUS
Status: DISPENSED
Start: 2023-11-29

## (undated) RX ORDER — ESMOLOL HYDROCHLORIDE 10 MG/ML
INJECTION INTRAVENOUS
Status: DISPENSED
Start: 2023-11-29

## (undated) RX ORDER — CEFAZOLIN SODIUM 2 G/50ML
SOLUTION INTRAVENOUS
Status: DISPENSED
Start: 2024-11-15

## (undated) RX ORDER — FENTANYL CITRATE 50 UG/ML
INJECTION, SOLUTION INTRAMUSCULAR; INTRAVENOUS
Status: DISPENSED
Start: 2023-11-28